# Patient Record
Sex: MALE | Race: WHITE | NOT HISPANIC OR LATINO
[De-identification: names, ages, dates, MRNs, and addresses within clinical notes are randomized per-mention and may not be internally consistent; named-entity substitution may affect disease eponyms.]

---

## 2017-09-15 ENCOUNTER — APPOINTMENT (OUTPATIENT)
Dept: CARDIOLOGY | Facility: CLINIC | Age: 62
End: 2017-09-15
Payer: COMMERCIAL

## 2017-09-15 ENCOUNTER — NON-APPOINTMENT (OUTPATIENT)
Age: 62
End: 2017-09-15

## 2017-09-15 VITALS
DIASTOLIC BLOOD PRESSURE: 77 MMHG | WEIGHT: 212 LBS | HEART RATE: 76 BPM | OXYGEN SATURATION: 95 % | BODY MASS INDEX: 34.07 KG/M2 | HEIGHT: 66 IN | SYSTOLIC BLOOD PRESSURE: 126 MMHG

## 2017-09-15 PROCEDURE — 99401 PREV MED CNSL INDIV APPRX 15: CPT

## 2017-09-15 PROCEDURE — 93000 ELECTROCARDIOGRAM COMPLETE: CPT

## 2017-09-15 PROCEDURE — 99215 OFFICE O/P EST HI 40 MIN: CPT | Mod: 25

## 2017-09-29 ENCOUNTER — APPOINTMENT (OUTPATIENT)
Dept: CARDIOLOGY | Facility: CLINIC | Age: 62
End: 2017-09-29
Payer: COMMERCIAL

## 2017-09-29 PROCEDURE — 93306 TTE W/DOPPLER COMPLETE: CPT

## 2017-10-19 ENCOUNTER — APPOINTMENT (OUTPATIENT)
Dept: CARDIOLOGY | Facility: CLINIC | Age: 62
End: 2017-10-19
Payer: COMMERCIAL

## 2017-10-19 PROCEDURE — 93015 CV STRESS TEST SUPVJ I&R: CPT

## 2018-04-19 ENCOUNTER — EMERGENCY (EMERGENCY)
Facility: HOSPITAL | Age: 63
LOS: 1 days | Discharge: HOME | End: 2018-04-19
Attending: EMERGENCY MEDICINE | Admitting: EMERGENCY MEDICINE
Payer: COMMERCIAL

## 2018-04-19 VITALS — DIASTOLIC BLOOD PRESSURE: 99 MMHG | SYSTOLIC BLOOD PRESSURE: 157 MMHG | HEART RATE: 75 BPM

## 2018-04-19 VITALS
DIASTOLIC BLOOD PRESSURE: 99 MMHG | HEART RATE: 85 BPM | WEIGHT: 195.11 LBS | OXYGEN SATURATION: 97 % | TEMPERATURE: 98 F | SYSTOLIC BLOOD PRESSURE: 161 MMHG | RESPIRATION RATE: 16 BRPM

## 2018-04-19 DIAGNOSIS — Z98.89 OTHER SPECIFIED POSTPROCEDURAL STATES: Chronic | ICD-10-CM

## 2018-04-19 DIAGNOSIS — J98.19 OTHER PULMONARY COLLAPSE: Chronic | ICD-10-CM

## 2018-04-19 PROCEDURE — 99283 EMERGENCY DEPT VISIT LOW MDM: CPT | Mod: 25

## 2018-04-19 PROCEDURE — 30901 CONTROL OF NOSEBLEED: CPT

## 2018-04-19 NOTE — ED ADULT NURSE NOTE - CHPI ED SYMPTOMS NEG
no vomiting/no weakness/no decreased eating/drinking/no nausea/no chills/no numbness/no pain/no tingling/no dizziness/no fever

## 2018-04-19 NOTE — ED PROVIDER NOTE - CONSTITUTIONAL, MLM
normal... Well appearing, white male,  well nourished, awake, alert, oriented to person, place, time/situation and in no apparent distress.

## 2018-04-19 NOTE — ED ADULT NURSE NOTE - OBJECTIVE STATEMENT
a/ox4 patient BIBEMS for nose bleed x45 minutes. patient states he wiped his nose, it started bleeding, and now it wont stop. Hx of HTN. pending kim SAUCEDO orders. No nosebleed at this time, bleeding has stopped.

## 2018-04-19 NOTE — ED PROVIDER NOTE - OBJECTIVE STATEMENT
63 yo white male with spontaneous nose bleed while at work today now c/o nose bleed x few hours now here for evaluation. No trauma. No headache.

## 2018-04-19 NOTE — ED ADULT NURSE NOTE - PMH
H/O hyperlipidemia    Heart murmur    Hypertension    Renal colic    Testosterone deficiency    Unilateral inguinal hernia with obstruction and without gangrene, recurrence not specified

## 2018-09-11 ENCOUNTER — RESULT REVIEW (OUTPATIENT)
Age: 63
End: 2018-09-11

## 2019-12-04 ENCOUNTER — TRANSCRIPTION ENCOUNTER (OUTPATIENT)
Age: 64
End: 2019-12-04

## 2020-07-10 ENCOUNTER — RECORD ABSTRACTING (OUTPATIENT)
Age: 65
End: 2020-07-10

## 2020-07-10 DIAGNOSIS — Z86.79 PERSONAL HISTORY OF OTHER DISEASES OF THE CIRCULATORY SYSTEM: ICD-10-CM

## 2020-08-21 ENCOUNTER — RX RENEWAL (OUTPATIENT)
Age: 65
End: 2020-08-21

## 2020-09-22 ENCOUNTER — APPOINTMENT (OUTPATIENT)
Dept: INTERNAL MEDICINE | Facility: CLINIC | Age: 65
End: 2020-09-22
Payer: MEDICARE

## 2020-09-22 VITALS — SYSTOLIC BLOOD PRESSURE: 120 MMHG | DIASTOLIC BLOOD PRESSURE: 80 MMHG

## 2020-09-22 VITALS
HEART RATE: 81 BPM | HEIGHT: 66 IN | TEMPERATURE: 98.5 F | WEIGHT: 215 LBS | BODY MASS INDEX: 34.55 KG/M2 | SYSTOLIC BLOOD PRESSURE: 142 MMHG | OXYGEN SATURATION: 97 % | DIASTOLIC BLOOD PRESSURE: 85 MMHG

## 2020-09-22 DIAGNOSIS — S50.861S: ICD-10-CM

## 2020-09-22 DIAGNOSIS — Z80.42 FAMILY HISTORY OF MALIGNANT NEOPLASM OF PROSTATE: ICD-10-CM

## 2020-09-22 DIAGNOSIS — E66.3 OVERWEIGHT: ICD-10-CM
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
## 2020-09-22 DIAGNOSIS — R01.0 BENIGN AND INNOCENT CARDIAC MURMURS: ICD-10-CM

## 2020-09-22 PROCEDURE — 36415 COLL VENOUS BLD VENIPUNCTURE: CPT

## 2020-09-22 PROCEDURE — 99213 OFFICE O/P EST LOW 20 MIN: CPT | Mod: 25

## 2020-09-22 NOTE — HISTORY OF PRESENT ILLNESS
[FreeTextEntry1] : ROUTINE FOLLOW UP BP CHECK  [de-identified] : PATIENT W/ H/O HTN , HYPERLIPIDEMIA , PREDIABETES , WAQAS , NON RHEUMATIC AS FOR ROUTINE BP CHECK , HE FEELS WELL BUT HAS GAINED APPROXIMATELY 10 LBS

## 2020-09-22 NOTE — ASSESSMENT
[FreeTextEntry1] : PATIENT REMAINS CLINICALLY  STABLE W/O CARDIORESPIRATORY SYMPTOMS , HE IS AWARE THAT HE NEEDS TO LOSE WEIGHT

## 2020-09-22 NOTE — PHYSICAL EXAM
[No Acute Distress] : no acute distress [No Carotid Bruits] : no carotid bruits [No Abdominal Bruit] : a ~M bruit was not heard ~T in the abdomen [No Edema] : there was no peripheral edema [Normal] : soft, non-tender, non-distended, no masses palpated, no HSM and normal bowel sounds [de-identified] : S1 S2 + SYSTOLIC MURMUR RSB

## 2020-09-23 LAB
ALBUMIN SERPL ELPH-MCNC: 4.5 G/DL
ALP BLD-CCNC: 62 U/L
ALT SERPL-CCNC: 20 U/L
ANION GAP SERPL CALC-SCNC: 14 MMOL/L
AST SERPL-CCNC: 21 U/L
BILIRUB SERPL-MCNC: 0.5 MG/DL
BUN SERPL-MCNC: 16 MG/DL
CALCIUM SERPL-MCNC: 9.2 MG/DL
CHLORIDE SERPL-SCNC: 105 MMOL/L
CHOLEST SERPL-MCNC: 136 MG/DL
CHOLEST/HDLC SERPL: 3.4 RATIO
CO2 SERPL-SCNC: 23 MMOL/L
CREAT SERPL-MCNC: 0.97 MG/DL
ESTIMATED AVERAGE GLUCOSE: 114 MG/DL
GLUCOSE SERPL-MCNC: 100 MG/DL
GLUCOSE SERPL-MCNC: 105 MG/DL
HBA1C MFR BLD HPLC: 5.6 %
HDLC SERPL-MCNC: 40 MG/DL
LDLC SERPL CALC-MCNC: 74 MG/DL
POTASSIUM SERPL-SCNC: 4.2 MMOL/L
PROT SERPL-MCNC: 7.4 G/DL
SODIUM SERPL-SCNC: 141 MMOL/L
TRIGL SERPL-MCNC: 110 MG/DL

## 2020-11-12 ENCOUNTER — APPOINTMENT (OUTPATIENT)
Dept: PHARMACY | Facility: CLINIC | Age: 65
End: 2020-11-12
Payer: MEDICARE

## 2020-11-12 PROCEDURE — V5265A: CUSTOM

## 2020-11-12 PROCEDURE — V5014I: CUSTOM

## 2020-12-21 ENCOUNTER — APPOINTMENT (OUTPATIENT)
Dept: PHARMACY | Facility: CLINIC | Age: 65
End: 2020-12-21

## 2020-12-21 ENCOUNTER — APPOINTMENT (OUTPATIENT)
Dept: OTOLARYNGOLOGY | Facility: CLINIC | Age: 65
End: 2020-12-21

## 2020-12-29 ENCOUNTER — APPOINTMENT (OUTPATIENT)
Dept: INTERNAL MEDICINE | Facility: CLINIC | Age: 65
End: 2020-12-29
Payer: MEDICARE

## 2020-12-29 VITALS
WEIGHT: 215 LBS | BODY MASS INDEX: 34.55 KG/M2 | HEART RATE: 79 BPM | SYSTOLIC BLOOD PRESSURE: 151 MMHG | OXYGEN SATURATION: 97 % | DIASTOLIC BLOOD PRESSURE: 97 MMHG | HEIGHT: 66 IN | RESPIRATION RATE: 12 BRPM

## 2020-12-29 VITALS — DIASTOLIC BLOOD PRESSURE: 80 MMHG | SYSTOLIC BLOOD PRESSURE: 128 MMHG

## 2020-12-29 PROCEDURE — 36415 COLL VENOUS BLD VENIPUNCTURE: CPT

## 2020-12-29 PROCEDURE — 99213 OFFICE O/P EST LOW 20 MIN: CPT | Mod: 25

## 2020-12-29 NOTE — HISTORY OF PRESENT ILLNESS
[FreeTextEntry1] : ROUTINE FOLLOW UP  [de-identified] : PATIENT W/ H/O HTN , HLD FOR ROUTINE FOLLOW UP , FEELS WELL , HAS PUT ON SOME WEIGHT

## 2020-12-29 NOTE — PHYSICAL EXAM
[No Acute Distress] : no acute distress [No Carotid Bruits] : no carotid bruits [No Edema] : there was no peripheral edema [Normal] : soft, non-tender, non-distended, no masses palpated, no HSM and normal bowel sounds [de-identified] : S1 S2 NORMAL  2-3 / 6 SYSTOLIC MURMUR RSB , LSB

## 2020-12-31 LAB
ALBUMIN SERPL ELPH-MCNC: 4.2 G/DL
ALP BLD-CCNC: 59 U/L
ALT SERPL-CCNC: 21 U/L
ANION GAP SERPL CALC-SCNC: 12 MMOL/L
AST SERPL-CCNC: 18 U/L
BILIRUB SERPL-MCNC: 0.5 MG/DL
BUN SERPL-MCNC: 18 MG/DL
CALCIUM SERPL-MCNC: 8.7 MG/DL
CHLORIDE SERPL-SCNC: 106 MMOL/L
CHOLEST SERPL-MCNC: 165 MG/DL
CO2 SERPL-SCNC: 24 MMOL/L
CREAT SERPL-MCNC: 0.98 MG/DL
GLUCOSE SERPL-MCNC: 103 MG/DL
HDLC SERPL-MCNC: 48 MG/DL
LDLC SERPL CALC-MCNC: 98 MG/DL
NONHDLC SERPL-MCNC: 117 MG/DL
POTASSIUM SERPL-SCNC: 4.1 MMOL/L
PROT SERPL-MCNC: 7.2 G/DL
SODIUM SERPL-SCNC: 142 MMOL/L
TRIGL SERPL-MCNC: 98 MG/DL

## 2021-03-30 ENCOUNTER — RX RENEWAL (OUTPATIENT)
Age: 66
End: 2021-03-30

## 2021-04-01 ENCOUNTER — NON-APPOINTMENT (OUTPATIENT)
Age: 66
End: 2021-04-01

## 2021-04-14 ENCOUNTER — LABORATORY RESULT (OUTPATIENT)
Age: 66
End: 2021-04-14

## 2021-04-14 ENCOUNTER — APPOINTMENT (OUTPATIENT)
Dept: INTERNAL MEDICINE | Facility: CLINIC | Age: 66
End: 2021-04-14
Payer: MEDICARE

## 2021-04-14 VITALS
HEART RATE: 83 BPM | BODY MASS INDEX: 34.23 KG/M2 | WEIGHT: 213 LBS | HEIGHT: 66 IN | RESPIRATION RATE: 12 BRPM | OXYGEN SATURATION: 96 % | SYSTOLIC BLOOD PRESSURE: 122 MMHG | DIASTOLIC BLOOD PRESSURE: 75 MMHG

## 2021-04-14 VITALS — SYSTOLIC BLOOD PRESSURE: 120 MMHG | DIASTOLIC BLOOD PRESSURE: 76 MMHG

## 2021-04-14 PROCEDURE — 36415 COLL VENOUS BLD VENIPUNCTURE: CPT

## 2021-04-14 PROCEDURE — 99214 OFFICE O/P EST MOD 30 MIN: CPT | Mod: 25

## 2021-04-14 NOTE — PHYSICAL EXAM
[No Acute Distress] : no acute distress [Regular Rhythm] : with a regular rhythm [Normal S1, S2] : normal S1 and S2 [No Carotid Bruits] : no carotid bruits [No Abdominal Bruit] : a ~M bruit was not heard ~T in the abdomen [No Edema] : there was no peripheral edema [Normal] : soft, non-tender, non-distended, no masses palpated, no HSM and normal bowel sounds [de-identified] : 3/6 SYSTOLIC MURMUR RSB

## 2021-04-14 NOTE — HISTORY OF PRESENT ILLNESS
[FreeTextEntry1] : ROUTINE FOLLOW UP [de-identified] : 1. HTN : FEELS WELL , HAS BEEN UNABLE TO SHED WEIGHT \par 2. HLD: ON STATIN W/ LDL < 100

## 2021-04-14 NOTE — REVIEW OF SYSTEMS
[Negative] : Gastrointestinal [Dysuria] : no dysuria [Incontinence] : no incontinence [Hesitancy] : no hesitancy [Nocturia] : no nocturia [Hematuria] : no hematuria [Frequency] : no frequency

## 2021-04-15 LAB
CHOLEST SERPL-MCNC: 179 MG/DL
ESTIMATED AVERAGE GLUCOSE: 117 MG/DL
GLUCOSE SERPL-MCNC: 105 MG/DL
HBA1C MFR BLD HPLC: 5.7 %
HDLC SERPL-MCNC: 45 MG/DL
LDLC SERPL CALC-MCNC: 110 MG/DL
NONHDLC SERPL-MCNC: 134 MG/DL
TRIGL SERPL-MCNC: 119 MG/DL

## 2021-06-21 ENCOUNTER — RX RENEWAL (OUTPATIENT)
Age: 66
End: 2021-06-21

## 2021-08-03 ENCOUNTER — APPOINTMENT (OUTPATIENT)
Dept: INTERNAL MEDICINE | Facility: CLINIC | Age: 66
End: 2021-08-03
Payer: MEDICARE

## 2021-08-03 VITALS
OXYGEN SATURATION: 97 % | WEIGHT: 210 LBS | HEIGHT: 66 IN | HEART RATE: 65 BPM | SYSTOLIC BLOOD PRESSURE: 146 MMHG | RESPIRATION RATE: 12 BRPM | DIASTOLIC BLOOD PRESSURE: 80 MMHG | BODY MASS INDEX: 33.75 KG/M2

## 2021-08-03 VITALS — DIASTOLIC BLOOD PRESSURE: 80 MMHG | SYSTOLIC BLOOD PRESSURE: 140 MMHG

## 2021-08-03 PROCEDURE — 99214 OFFICE O/P EST MOD 30 MIN: CPT | Mod: 25

## 2021-08-03 PROCEDURE — 36415 COLL VENOUS BLD VENIPUNCTURE: CPT

## 2021-08-03 NOTE — HISTORY OF PRESENT ILLNESS
[FreeTextEntry1] : ROUTINE FOLLOW UP  [de-identified] : 1. HTN : FEELS WELL , HAS BEEN OFF LOW SODIUM DIET RECENTLY \par 2. PRE DIABETES \par 3. HLD \par 4. C/O RASH RIGHT THIGH X 2 WEEKS : [place] AND [place]IMM[place] A LOT

## 2021-08-04 LAB
ALBUMIN SERPL ELPH-MCNC: 4.2 G/DL
ALP BLD-CCNC: 60 U/L
ALT SERPL-CCNC: 20 U/L
ANION GAP SERPL CALC-SCNC: 11 MMOL/L
AST SERPL-CCNC: 20 U/L
BILIRUB SERPL-MCNC: 0.4 MG/DL
BUN SERPL-MCNC: 19 MG/DL
CALCIUM SERPL-MCNC: 9.3 MG/DL
CHLORIDE SERPL-SCNC: 106 MMOL/L
CHOLEST SERPL-MCNC: 143 MG/DL
CO2 SERPL-SCNC: 23 MMOL/L
CREAT SERPL-MCNC: 0.97 MG/DL
ESTIMATED AVERAGE GLUCOSE: 117 MG/DL
GLUCOSE SERPL-MCNC: 104 MG/DL
GLUCOSE SERPL-MCNC: 97 MG/DL
HBA1C MFR BLD HPLC: 5.7 %
HDLC SERPL-MCNC: 42 MG/DL
LDLC SERPL CALC-MCNC: 81 MG/DL
NONHDLC SERPL-MCNC: 101 MG/DL
POTASSIUM SERPL-SCNC: 4.5 MMOL/L
PROT SERPL-MCNC: 7.3 G/DL
SODIUM SERPL-SCNC: 140 MMOL/L
TRIGL SERPL-MCNC: 101 MG/DL

## 2021-09-03 NOTE — PHYSICAL EXAM
[No Acute Distress] : no acute distress [No Lymphadenopathy] : no lymphadenopathy [Thyroid Normal, No Nodules] : the thyroid was normal and there were no nodules present [Regular Rhythm] : with a regular rhythm [Normal S1, S2] : normal S1 and S2 [No Carotid Bruits] : no carotid bruits [No Edema] : there was no peripheral edema [Normal] : soft, non-tender, non-distended, no masses palpated, no HSM and normal bowel sounds [de-identified] : 3-4 /6 SYSTOLIC MURMUR RSB  [de-identified] : + OVAL RASH , ERYTHEMATOUS W/ [facility] RIGHT THIGH , 2 AREAS  Admitting Office

## 2021-09-14 ENCOUNTER — RX RENEWAL (OUTPATIENT)
Age: 66
End: 2021-09-14

## 2021-09-17 ENCOUNTER — RX RENEWAL (OUTPATIENT)
Age: 66
End: 2021-09-17

## 2021-11-04 ENCOUNTER — APPOINTMENT (OUTPATIENT)
Dept: INTERNAL MEDICINE | Facility: CLINIC | Age: 66
End: 2021-11-04
Payer: MEDICARE

## 2021-11-04 VITALS
HEIGHT: 66 IN | RESPIRATION RATE: 12 BRPM | OXYGEN SATURATION: 94 % | SYSTOLIC BLOOD PRESSURE: 130 MMHG | DIASTOLIC BLOOD PRESSURE: 79 MMHG | WEIGHT: 206.44 LBS | BODY MASS INDEX: 33.18 KG/M2 | HEART RATE: 73 BPM

## 2021-11-04 VITALS — DIASTOLIC BLOOD PRESSURE: 70 MMHG | SYSTOLIC BLOOD PRESSURE: 128 MMHG

## 2021-11-04 PROCEDURE — 90686 IIV4 VACC NO PRSV 0.5 ML IM: CPT

## 2021-11-04 PROCEDURE — G0008: CPT

## 2021-11-04 PROCEDURE — 99214 OFFICE O/P EST MOD 30 MIN: CPT | Mod: 25

## 2021-11-04 PROCEDURE — 36415 COLL VENOUS BLD VENIPUNCTURE: CPT

## 2021-11-04 NOTE — ASSESSMENT
[FreeTextEntry1] : -STABLE \par -CONT PRESENT MEDS \par -ADVISED WEIGHT REDUCTION \par -REG AEROBIC EXERCISE

## 2021-11-04 NOTE — HISTORY OF PRESENT ILLNESS
[FreeTextEntry1] : ROUTINE FOLLOW UP  [de-identified] : 1. HTN \par 2. HLD \par 3. PRE DIABETES \par FEELS WELL , COMPLIANT W/ ALL MEDS \par 4. ENCOUNTER FOR FLU VACCINATION

## 2021-11-10 LAB
ALBUMIN SERPL ELPH-MCNC: 4.3 G/DL
ALP BLD-CCNC: 65 U/L
ALT SERPL-CCNC: 16 U/L
ANION GAP SERPL CALC-SCNC: 14 MMOL/L
AST SERPL-CCNC: 19 U/L
BILIRUB SERPL-MCNC: 0.5 MG/DL
BUN SERPL-MCNC: 18 MG/DL
CALCIUM SERPL-MCNC: 9.4 MG/DL
CHLORIDE SERPL-SCNC: 104 MMOL/L
CHOLEST SERPL-MCNC: 145 MG/DL
CO2 SERPL-SCNC: 23 MMOL/L
CREAT SERPL-MCNC: 0.93 MG/DL
ESTIMATED AVERAGE GLUCOSE: 120 MG/DL
GLUCOSE SERPL-MCNC: 94 MG/DL
GLUCOSE SERPL-MCNC: 97 MG/DL
HBA1C MFR BLD HPLC: 5.8 %
HDLC SERPL-MCNC: 41 MG/DL
LDLC SERPL CALC-MCNC: 81 MG/DL
NONHDLC SERPL-MCNC: 104 MG/DL
POTASSIUM SERPL-SCNC: 4.6 MMOL/L
PROT SERPL-MCNC: 7.5 G/DL
SODIUM SERPL-SCNC: 141 MMOL/L
TRIGL SERPL-MCNC: 114 MG/DL

## 2022-01-31 ENCOUNTER — RX RENEWAL (OUTPATIENT)
Age: 67
End: 2022-01-31

## 2022-02-09 ENCOUNTER — APPOINTMENT (OUTPATIENT)
Dept: INTERNAL MEDICINE | Facility: CLINIC | Age: 67
End: 2022-02-09
Payer: MEDICARE

## 2022-02-09 VITALS
SYSTOLIC BLOOD PRESSURE: 123 MMHG | HEART RATE: 75 BPM | HEIGHT: 66 IN | WEIGHT: 206 LBS | DIASTOLIC BLOOD PRESSURE: 77 MMHG | RESPIRATION RATE: 12 BRPM | BODY MASS INDEX: 33.11 KG/M2 | OXYGEN SATURATION: 98 %

## 2022-02-09 DIAGNOSIS — N40.1 BENIGN PROSTATIC HYPERPLASIA WITH LOWER URINARY TRACT SYMPMS: ICD-10-CM

## 2022-02-09 DIAGNOSIS — R35.1 BENIGN PROSTATIC HYPERPLASIA WITH LOWER URINARY TRACT SYMPMS: ICD-10-CM

## 2022-02-09 PROCEDURE — 99213 OFFICE O/P EST LOW 20 MIN: CPT

## 2022-02-09 NOTE — HISTORY OF PRESENT ILLNESS
[FreeTextEntry1] : ROUTINE FOLLOW UP  [de-identified] : 1. HTN \par 2. HLD \par FEELS WELL\par 3. CARDIAC MURMUR

## 2022-02-09 NOTE — PHYSICAL EXAM
[No Acute Distress] : no acute distress [Normal Sclera/Conjunctiva] : normal sclera/conjunctiva [Normal S1, S2] : normal S1 and S2 [No Edema] : there was no peripheral edema [Normal] : soft, non-tender, non-distended, no masses palpated, no HSM and normal bowel sounds [No CVA Tenderness] : no CVA  tenderness [de-identified] : 4/6 SYSTOLIC MURMUR RSB  [de-identified] : + TRANSMITTED MURMUR TO CAROTIDS

## 2022-03-08 ENCOUNTER — NON-APPOINTMENT (OUTPATIENT)
Age: 67
End: 2022-03-08

## 2022-03-08 ENCOUNTER — APPOINTMENT (OUTPATIENT)
Dept: CARDIOLOGY | Facility: CLINIC | Age: 67
End: 2022-03-08
Payer: MEDICARE

## 2022-03-08 VITALS
HEART RATE: 74 BPM | DIASTOLIC BLOOD PRESSURE: 83 MMHG | SYSTOLIC BLOOD PRESSURE: 137 MMHG | BODY MASS INDEX: 33.43 KG/M2 | WEIGHT: 208 LBS | OXYGEN SATURATION: 97 % | HEIGHT: 66 IN

## 2022-03-08 PROCEDURE — 93000 ELECTROCARDIOGRAM COMPLETE: CPT

## 2022-03-08 PROCEDURE — 99205 OFFICE O/P NEW HI 60 MIN: CPT

## 2022-03-14 ENCOUNTER — APPOINTMENT (OUTPATIENT)
Dept: CARDIOLOGY | Facility: CLINIC | Age: 67
End: 2022-03-14
Payer: MEDICARE

## 2022-03-14 PROCEDURE — 93306 TTE W/DOPPLER COMPLETE: CPT

## 2022-03-14 PROCEDURE — 93880 EXTRACRANIAL BILAT STUDY: CPT

## 2022-03-14 RX ORDER — PERFLUTREN 6.52 MG/ML
6.52 INJECTION, SUSPENSION INTRAVENOUS
Qty: 2 | Refills: 0 | Status: COMPLETED | OUTPATIENT
Start: 2022-03-14

## 2022-03-14 RX ADMIN — PERFLUTREN MG/ML: 6.52 INJECTION, SUSPENSION INTRAVENOUS at 00:00

## 2022-03-23 ENCOUNTER — APPOINTMENT (OUTPATIENT)
Dept: CARDIOLOGY | Facility: CLINIC | Age: 67
End: 2022-03-23
Payer: MEDICARE

## 2022-03-23 ENCOUNTER — NON-APPOINTMENT (OUTPATIENT)
Age: 67
End: 2022-03-23

## 2022-03-23 VITALS
SYSTOLIC BLOOD PRESSURE: 136 MMHG | BODY MASS INDEX: 33.43 KG/M2 | HEIGHT: 66 IN | HEART RATE: 71 BPM | WEIGHT: 208 LBS | DIASTOLIC BLOOD PRESSURE: 81 MMHG | OXYGEN SATURATION: 96 %

## 2022-03-23 PROCEDURE — 93000 ELECTROCARDIOGRAM COMPLETE: CPT

## 2022-03-23 PROCEDURE — 99215 OFFICE O/P EST HI 40 MIN: CPT

## 2022-03-25 ENCOUNTER — APPOINTMENT (OUTPATIENT)
Dept: CARDIOTHORACIC SURGERY | Facility: CLINIC | Age: 67
End: 2022-03-25
Payer: MEDICARE

## 2022-03-25 VITALS
RESPIRATION RATE: 14 BRPM | BODY MASS INDEX: 33.59 KG/M2 | TEMPERATURE: 98.1 F | WEIGHT: 209 LBS | DIASTOLIC BLOOD PRESSURE: 76 MMHG | SYSTOLIC BLOOD PRESSURE: 144 MMHG | HEART RATE: 70 BPM | HEIGHT: 66 IN | OXYGEN SATURATION: 98 %

## 2022-03-25 DIAGNOSIS — G47.33 OBSTRUCTIVE SLEEP APNEA (ADULT) (PEDIATRIC): ICD-10-CM

## 2022-03-25 PROCEDURE — 99203 OFFICE O/P NEW LOW 30 MIN: CPT

## 2022-03-25 RX ORDER — CLOTRIMAZOLE AND BETAMETHASONE DIPROPIONATE 10; .5 MG/G; MG/G
1-0.05 CREAM TOPICAL TWICE DAILY
Qty: 1 | Refills: 0 | Status: COMPLETED | COMMUNITY
Start: 2021-08-03 | End: 2022-03-25

## 2022-03-30 NOTE — END OF VISIT
[FreeTextEntry3] : Written by Cosme Lopez NP, acting as a scribe for Dr. Cameron\par “The documentation recorded by the scribe accurately reflects the service I personally performed and the decisions made by me.” Signature Micah Cameron MD.

## 2022-03-30 NOTE — REVIEW OF SYSTEMS
[As Noted in HPI] : as noted in HPI [Shortness Of Breath] : shortness of breath [SOB on Exertion] : shortness of breath during exertion [Negative] : Heme/Lymph [Chest Pain] : no chest pain [Palpitations] : no palpitations [Lower Ext Edema] : no extremity edema

## 2022-03-30 NOTE — CONSULT LETTER
[Dear  ___] : Dear  [unfilled], [FreeTextEntry2] : Dr.Gaetano Gill [FreeTextEntry1] : I had the pleasure of seeing your patient, HUGH SCOTT,in my office today. \par \par We take a multidisciplinary team approach to patient care and consider you, the referring physician, an extension of our team. We will maintain an open line of communication with you throughout your patient's treatment course. \par \par He  is being evaluated for Aortic stenosis . I have reviewed all of the patient's medical records and diagnostic images at the time of his  office consultation. I have enclosed a copy for your records. \par \par 3/14/22 TTE revealed EF 75%, Minimal mitral regurgitation. Calcified aortic valve with decreased opening. PGR 84 mm hg, MGR 56 mm hg , estimated STONE 0.6 sq cm. aortic valve velocity time integral equals 106 cm, consistent with severe aortic stenosis. Mild aortic regurgitation. Moderately dilated LT atrium. Minimal TR.\par \par I have reviewed the indications for surgery and anatomy of the heart. The patient meets criteria for surgery. I have recommended that the patient is a candidate for a ______________. \par \par Surgery is scheduled for ___________.  I will update you on his perioperative status and disposition upon discharge. \par \par I appreciate the opportunity to care for your patient at the  [facility] based at [place]. If there are any questions or concerns, please call me  at [phone].\par \par Please see my note below. \par \par Sincerely, \par \par \par \par \par Micah Cameron MD\par Director\par [facility]\par Professor \par Cardiovascular & Thoracic Surgery\par [employer].\par \par \par [facility]:\par Department of Cardiovascular and Thoracic Surgery\par [address] [place], [address]\par Office: [phone]\par Fax: [phone]\par \par [facility] Pace\par  \par Department of Cardiovascular and Thoracic Surgery\par [address] [place], [address]\par Phone: [phone]\par Office: [phone]\par \par \par \par \par \par

## 2022-03-30 NOTE — HISTORY OF PRESENT ILLNESS
[FreeTextEntry1] : Mr. SCOTT is a 66 year old male with  past medical history of Hypertension, Hyperlipidemia, WAQAS (uses CPAP) ,BPH, aortic stenosis  with complaints of dyspnea on exertion. He is referred by Dr.Gaetano Gill for initial evaluation and management for Aortic stenosis. \par \par He presents today with his wife and reports worsening SOB with activity that has worsened over the past 6 months to one year.  He first noticed it with walking up and down his stairs at home. Better able to walk on flat surfaces but does note he also has to stop often when walking his dog. Sleeps with one pillow and denies orthopnea. Denies CP, palpitations, dizziness, weight gain, LE swelling, fevers or chills. Tested COVID + in December 2021 but was asymptomatic.  He has completed COVID vaccine + 1 booster. Mother and maternal uncle with hx of HF/CAD both passed away in early 60's. He is a retired emergency , retired four years ago. \par \par Denies prior MI, CVA, lung, liver, kidney disease.  No DM.

## 2022-03-30 NOTE — PHYSICAL EXAM
[General Appearance - Alert] : alert [General Appearance - In No Acute Distress] : in no acute distress [General Appearance - Well Developed] : well developed [General Appearance - Well Nourished] : well nourished [Sclera] : the sclera and conjunctiva were normal [Neck Appearance] : the appearance of the neck was normal [Jugular Venous Distention Increased] : there was no jugular-venous distention [] : no respiratory distress [Respiration, Rhythm And Depth] : normal respiratory rhythm and effort [Exaggerated Use Of Accessory Muscles For Inspiration] : no accessory muscle use [Auscultation Breath Sounds / Voice Sounds] : lungs were clear to auscultation bilaterally [Apical Impulse] : the apical impulse was normal [Heart Rate And Rhythm] : heart rate was normal and rhythm regular [Heart Sounds] : normal S1 and S2 [Examination Of The Chest] : the chest was normal in appearance [Bowel Sounds] : normal bowel sounds [Abdomen Soft] : soft [Abdomen Tenderness] : non-tender [No CVA Tenderness] : no ~M costovertebral angle tenderness [Involuntary Movements] : no involuntary movements were seen [No Focal Deficits] : no focal deficits [Oriented To Time, Place, And Person] : oriented to person, place, and time [Impaired Insight] : insight and judgment were intact [Affect] : the affect was normal [Mood] : the mood was normal [FreeTextEntry1] : murmur auscultated to bilateral carotids

## 2022-03-30 NOTE — ASSESSMENT
[FreeTextEntry1] : I reviewed the cardiac imaging, medical records and reports with patient and discussed the case.  I discussed the risks, benefits and alternatives to open aortic valve replacement surgery. Risks included but not limited to bleeding, stroke, Myocardial Infarction, kidney problems, blood transfusion, permanent pacemaker implantation, infections and death. I quoted a 2-3% operative mortality and complication risks. I also discussed tissue valve vs mechanical valve options and the pros and cons associated with both. I discussed the various approaches in detail. I feel that the patient will benefit and is a candidate for open aortic valve replacement.  All questions and concerns were addressed and patient agrees to proceed with surgery. \par \par Plan:\par 1) Aortic valve replacement \par 2) Cardiac Catherization to evaluate coronary arteries prior to surgery\par 3) PST prior to surgery\par \par ADDENDUM--66 yr old male with echo showing severe calcific aortic stenosis with peak gradient of 56 mmHg and STONE 0.6 cm2.  LV systolic function is normal with moderate concentric LVH.  Plan AVR with bioprosthetic valve.  Risks, including but not limited to bleeding, infection, mi, stroke, blood transfusions, pacemaker implant, and death discussed with patient.  \par \par \par

## 2022-05-01 ENCOUNTER — RX RENEWAL (OUTPATIENT)
Age: 67
End: 2022-05-01

## 2022-05-03 ENCOUNTER — INPATIENT (INPATIENT)
Facility: HOSPITAL | Age: 67
LOS: 5 days | Discharge: HOME HEALTH | DRG: 216 | End: 2022-05-09
Attending: THORACIC SURGERY (CARDIOTHORACIC VASCULAR SURGERY) | Admitting: INTERNAL MEDICINE
Payer: MEDICARE

## 2022-05-03 VITALS
WEIGHT: 216.05 LBS | HEIGHT: 66 IN | DIASTOLIC BLOOD PRESSURE: 74 MMHG | TEMPERATURE: 98 F | RESPIRATION RATE: 16 BRPM | OXYGEN SATURATION: 99 % | HEART RATE: 75 BPM | SYSTOLIC BLOOD PRESSURE: 163 MMHG

## 2022-05-03 DIAGNOSIS — Z98.890 OTHER SPECIFIED POSTPROCEDURAL STATES: Chronic | ICD-10-CM

## 2022-05-03 DIAGNOSIS — I35.0 NONRHEUMATIC AORTIC (VALVE) STENOSIS: ICD-10-CM

## 2022-05-03 DIAGNOSIS — J98.19 OTHER PULMONARY COLLAPSE: Chronic | ICD-10-CM

## 2022-05-03 DIAGNOSIS — Z98.89 OTHER SPECIFIED POSTPROCEDURAL STATES: Chronic | ICD-10-CM

## 2022-05-03 LAB
A1C WITH ESTIMATED AVERAGE GLUCOSE RESULT: 5.9 % — HIGH (ref 4–5.6)
ALBUMIN SERPL ELPH-MCNC: 4 G/DL — SIGNIFICANT CHANGE UP (ref 3.3–5)
ALP SERPL-CCNC: 57 U/L — SIGNIFICANT CHANGE UP (ref 40–120)
ALT FLD-CCNC: 16 U/L — SIGNIFICANT CHANGE UP (ref 10–45)
ANION GAP SERPL CALC-SCNC: 11 MMOL/L — SIGNIFICANT CHANGE UP (ref 5–17)
APPEARANCE UR: CLEAR — SIGNIFICANT CHANGE UP
APTT BLD: 28 SEC — SIGNIFICANT CHANGE UP (ref 27.5–35.5)
AST SERPL-CCNC: 17 U/L — SIGNIFICANT CHANGE UP (ref 10–40)
BILIRUB SERPL-MCNC: 0.4 MG/DL — SIGNIFICANT CHANGE UP (ref 0.2–1.2)
BILIRUB UR-MCNC: NEGATIVE — SIGNIFICANT CHANGE UP
BLD GP AB SCN SERPL QL: NEGATIVE — SIGNIFICANT CHANGE UP
BUN SERPL-MCNC: 22 MG/DL — SIGNIFICANT CHANGE UP (ref 7–23)
CALCIUM SERPL-MCNC: 9.1 MG/DL — SIGNIFICANT CHANGE UP (ref 8.4–10.5)
CHLORIDE SERPL-SCNC: 107 MMOL/L — SIGNIFICANT CHANGE UP (ref 96–108)
CHOLEST SERPL-MCNC: 154 MG/DL — SIGNIFICANT CHANGE UP
CO2 SERPL-SCNC: 24 MMOL/L — SIGNIFICANT CHANGE UP (ref 22–31)
COLOR SPEC: SIGNIFICANT CHANGE UP
CREAT SERPL-MCNC: 0.93 MG/DL — SIGNIFICANT CHANGE UP (ref 0.5–1.3)
DIFF PNL FLD: NEGATIVE — SIGNIFICANT CHANGE UP
EGFR: 90 ML/MIN/1.73M2 — SIGNIFICANT CHANGE UP
ESTIMATED AVERAGE GLUCOSE: 123 MG/DL — HIGH (ref 68–114)
FIBRINOGEN PPP-MCNC: 536 MG/DL — HIGH (ref 330–520)
GLUCOSE SERPL-MCNC: 111 MG/DL — HIGH (ref 70–99)
GLUCOSE UR QL: NEGATIVE — SIGNIFICANT CHANGE UP
HCT VFR BLD CALC: 44.1 % — SIGNIFICANT CHANGE UP (ref 39–50)
HDLC SERPL-MCNC: 37 MG/DL — LOW
HGB BLD-MCNC: 14.1 G/DL — SIGNIFICANT CHANGE UP (ref 13–17)
INR BLD: 1.06 RATIO — SIGNIFICANT CHANGE UP (ref 0.88–1.16)
KETONES UR-MCNC: NEGATIVE — SIGNIFICANT CHANGE UP
LEUKOCYTE ESTERASE UR-ACNC: NEGATIVE — SIGNIFICANT CHANGE UP
LIPID PNL WITH DIRECT LDL SERPL: 99 MG/DL — SIGNIFICANT CHANGE UP
MCHC RBC-ENTMCNC: 27 PG — SIGNIFICANT CHANGE UP (ref 27–34)
MCHC RBC-ENTMCNC: 32 GM/DL — SIGNIFICANT CHANGE UP (ref 32–36)
MCV RBC AUTO: 84.5 FL — SIGNIFICANT CHANGE UP (ref 80–100)
NITRITE UR-MCNC: NEGATIVE — SIGNIFICANT CHANGE UP
NON HDL CHOLESTEROL: 117 MG/DL — SIGNIFICANT CHANGE UP
NRBC # BLD: 0 /100 WBCS — SIGNIFICANT CHANGE UP (ref 0–0)
NT-PROBNP SERPL-SCNC: 1023 PG/ML — HIGH (ref 0–300)
PH UR: 7 — SIGNIFICANT CHANGE UP (ref 5–8)
PLATELET # BLD AUTO: 149 K/UL — LOW (ref 150–400)
POTASSIUM SERPL-MCNC: 4.7 MMOL/L — SIGNIFICANT CHANGE UP (ref 3.5–5.3)
POTASSIUM SERPL-SCNC: 4.7 MMOL/L — SIGNIFICANT CHANGE UP (ref 3.5–5.3)
PROT SERPL-MCNC: 7.5 G/DL — SIGNIFICANT CHANGE UP (ref 6–8.3)
PROT UR-MCNC: NEGATIVE — SIGNIFICANT CHANGE UP
PROTHROM AB SERPL-ACNC: 12.3 SEC — SIGNIFICANT CHANGE UP (ref 10.5–13.4)
RBC # BLD: 5.22 M/UL — SIGNIFICANT CHANGE UP (ref 4.2–5.8)
RBC # FLD: 13.8 % — SIGNIFICANT CHANGE UP (ref 10.3–14.5)
RH IG SCN BLD-IMP: POSITIVE — SIGNIFICANT CHANGE UP
SARS-COV-2 RNA SPEC QL NAA+PROBE: SIGNIFICANT CHANGE UP
SODIUM SERPL-SCNC: 142 MMOL/L — SIGNIFICANT CHANGE UP (ref 135–145)
SP GR SPEC: 1.02 — SIGNIFICANT CHANGE UP (ref 1.01–1.02)
T3 SERPL-MCNC: 109 NG/DL — SIGNIFICANT CHANGE UP (ref 80–200)
T4 AB SER-ACNC: 8.1 UG/DL — SIGNIFICANT CHANGE UP (ref 4.6–12)
TRIGL SERPL-MCNC: 90 MG/DL — SIGNIFICANT CHANGE UP
TSH SERPL-MCNC: 1.92 UIU/ML — SIGNIFICANT CHANGE UP (ref 0.27–4.2)
TSH SERPL-MCNC: 1.94 UIU/ML — SIGNIFICANT CHANGE UP (ref 0.27–4.2)
UROBILINOGEN FLD QL: NEGATIVE — SIGNIFICANT CHANGE UP
WBC # BLD: 7.8 K/UL — SIGNIFICANT CHANGE UP (ref 3.8–10.5)
WBC # FLD AUTO: 7.8 K/UL — SIGNIFICANT CHANGE UP (ref 3.8–10.5)

## 2022-05-03 PROCEDURE — 99152 MOD SED SAME PHYS/QHP 5/>YRS: CPT

## 2022-05-03 PROCEDURE — 93454 CORONARY ARTERY ANGIO S&I: CPT | Mod: 26

## 2022-05-03 PROCEDURE — 71045 X-RAY EXAM CHEST 1 VIEW: CPT | Mod: 26

## 2022-05-03 PROCEDURE — 93010 ELECTROCARDIOGRAM REPORT: CPT

## 2022-05-03 PROCEDURE — 99024 POSTOP FOLLOW-UP VISIT: CPT

## 2022-05-03 RX ORDER — CHLORHEXIDINE GLUCONATE 213 G/1000ML
5 SOLUTION TOPICAL ONCE
Refills: 0 | Status: COMPLETED | OUTPATIENT
Start: 2022-05-03 | End: 2022-05-04

## 2022-05-03 RX ORDER — LISINOPRIL 2.5 MG/1
40 TABLET ORAL DAILY
Refills: 0 | Status: DISCONTINUED | OUTPATIENT
Start: 2022-05-03 | End: 2022-05-03

## 2022-05-03 RX ORDER — SODIUM CHLORIDE 9 MG/ML
3 INJECTION INTRAMUSCULAR; INTRAVENOUS; SUBCUTANEOUS EVERY 8 HOURS
Refills: 0 | Status: DISCONTINUED | OUTPATIENT
Start: 2022-05-03 | End: 2022-05-04

## 2022-05-03 RX ORDER — ACETAMINOPHEN 500 MG
1000 TABLET ORAL ONCE
Refills: 0 | Status: COMPLETED | OUTPATIENT
Start: 2022-05-04 | End: 2022-05-04

## 2022-05-03 RX ORDER — ATORVASTATIN CALCIUM 80 MG/1
80 TABLET, FILM COATED ORAL AT BEDTIME
Refills: 0 | Status: DISCONTINUED | OUTPATIENT
Start: 2022-05-03 | End: 2022-05-04

## 2022-05-03 RX ORDER — HEPARIN SODIUM 5000 [USP'U]/ML
5000 INJECTION INTRAVENOUS; SUBCUTANEOUS EVERY 8 HOURS
Refills: 0 | Status: DISCONTINUED | OUTPATIENT
Start: 2022-05-03 | End: 2022-05-04

## 2022-05-03 RX ORDER — VANCOMYCIN HCL 1 G
1000 VIAL (EA) INTRAVENOUS ONCE
Refills: 0 | Status: DISCONTINUED | OUTPATIENT
Start: 2022-05-04 | End: 2022-05-04

## 2022-05-03 RX ORDER — GABAPENTIN 400 MG/1
300 CAPSULE ORAL ONCE
Refills: 0 | Status: COMPLETED | OUTPATIENT
Start: 2022-05-04 | End: 2022-05-04

## 2022-05-03 RX ORDER — CHLORHEXIDINE GLUCONATE 213 G/1000ML
1 SOLUTION TOPICAL ONCE
Refills: 0 | Status: COMPLETED | OUTPATIENT
Start: 2022-05-03 | End: 2022-05-03

## 2022-05-03 RX ORDER — ASCORBIC ACID 60 MG
2000 TABLET,CHEWABLE ORAL ONCE
Refills: 0 | Status: COMPLETED | OUTPATIENT
Start: 2022-05-03 | End: 2022-05-03

## 2022-05-03 RX ADMIN — ATORVASTATIN CALCIUM 80 MILLIGRAM(S): 80 TABLET, FILM COATED ORAL at 22:09

## 2022-05-03 RX ADMIN — Medication 2000 MILLIGRAM(S): at 22:10

## 2022-05-03 RX ADMIN — SODIUM CHLORIDE 3 MILLILITER(S): 9 INJECTION INTRAMUSCULAR; INTRAVENOUS; SUBCUTANEOUS at 22:17

## 2022-05-03 RX ADMIN — HEPARIN SODIUM 5000 UNIT(S): 5000 INJECTION INTRAVENOUS; SUBCUTANEOUS at 22:09

## 2022-05-03 RX ADMIN — CHLORHEXIDINE GLUCONATE 1 APPLICATION(S): 213 SOLUTION TOPICAL at 22:00

## 2022-05-03 RX ADMIN — SODIUM CHLORIDE 3 MILLILITER(S): 9 INJECTION INTRAMUSCULAR; INTRAVENOUS; SUBCUTANEOUS at 15:52

## 2022-05-03 NOTE — ASU PATIENT PROFILE, ADULT - FALL HARM RISK - UNIVERSAL INTERVENTIONS
Bed in lowest position, wheels locked, appropriate side rails in place/Call bell, personal items and telephone in reach/Instruct patient to call for assistance before getting out of bed or chair/Non-slip footwear when patient is out of bed/[place] to call system/Physically safe environment - no spills, clutter or unnecessary equipment/Purposeful Proactive Rounding/Room/bathroom lighting operational, light cord in reach

## 2022-05-03 NOTE — PROGRESS NOTE ADULT - ASSESSMENT
66 year old male with no implantable cardiac devices and PMH of WAQAS -uses CPAP, htn, hld, AS, kidney stones, COVID infection Dec 2021 (not hospitalized) and pre DM presents today for [facility]. Patient reports progressive WHALEY since 2018, at which time he was diagnosed with AS. Over the past 6 months approx he feels increasing WHALEY- even when walking the dog. Denies CP, dizziness or syncope. Repeat ECHO was done 3/14/2022 revealing min MR, severe AS , mild AR, EF 75%.  Patient was referred to Dr Micah Cameron for AVR scheduled for 5/4/2022. For [facility] today to evaluate coronaries prior to OHS.

## 2022-05-03 NOTE — PATIENT PROFILE ADULT - FALL HARM RISK - UNIVERSAL INTERVENTIONS
Bed in lowest position, wheels locked, appropriate side rails in place/Call bell, personal items and telephone in reach/Instruct patient to call for assistance before getting out of bed or chair/Non-slip footwear when patient is out of bed/[place] to call system/Physically safe environment - no spills, clutter or unnecessary equipment/Purposeful Proactive Rounding/Room/bathroom lighting operational, light cord in reach

## 2022-05-03 NOTE — CHART NOTE - NSCHARTNOTEFT_GEN_A_CORE
Scheduled for elective LHC today pre AVR in AM   Pre and post hydration not given due to severe aortic stenosis

## 2022-05-03 NOTE — PROGRESS NOTE ADULT - SUBJECTIVE AND OBJECTIVE BOX
Cardiac Surgery Pre-op Note:    CC: Patient is a 66y old  Male who presents with a chief complaint of     Referring Physician:                                                                                                           Surgeon:    Procedure: (Date) (Procedure)    Allergies    penicillin (Urticaria; Rash)  pollen itchy eye (Eye Irritation)  sulfa drugs (Urticaria; Rash)    Intolerances        HPI:  66 year old male with no implantable cardiac devices and PMH of WAQAS -uses CPAP, htn, hld, AS, kidney stones, COVID infection Dec 2021 (not hospitalized) and pre DM presents today for C. Patient reports progressive WHALEY since 2018, at which time he was diagnosed with AS. Over the past 6 months approx he feels increasing WHALEY- even when walking the dog. Denies CP, dizziness or syncope. Repeat ECHO was done 3/14/2022 revealing min MR, severe AS , mild AR, EF 75%.  Patient was referred to Dr Micah Cameron for AVR scheduled for 5/4/2022. For [facility] today to evaluate coronaries prior to OHS.  (03 May 2022 08:14)      PAST MEDICAL & SURGICAL HISTORY:  Hypertension    Testosterone deficiency    H/O hyperlipidemia    Renal colic    Unilateral inguinal hernia with obstruction and without gangrene, recurrence not specified    Heart murmur    Obstructive sleep apnea    H/O aortic valve stenosis    Status post medial meniscus repair  2015    Collapsed lung    H/O lithotripsy        MEDICATIONS  (STANDING):  ascorbic acid 2000 milliGRAM(s) Oral once  chlorhexidine 0.12% Liquid 5 milliLiter(s) Swish and Spit once  chlorhexidine 4% Liquid 1 Application(s) Topical once  sodium chloride 0.9% lock flush 3 milliLiter(s) IV Push every 8 hours    MEDICATIONS  (PRN):        Labs:                        14.1   7.80  )-----------( 149      ( 03 May 2022 08:48 )             44.1     05-03    142  |  107  |  22  ----------------------------<  111<H>  4.7   |  24  |  0.93    Ca    9.1      03 May 2022 08:48    TPro  7.5  /  Alb  4.0  /  TBili  0.4  /  DBili  x   /  AST  17  /  ALT  16  /  AlkPhos  57  05-03    PT/INR - ( 03 May 2022 08:48 )   PT: 12.3 sec;   INR: 1.06 ratio         PTT - ( 03 May 2022 08:48 )  PTT:28.0 sec    Blood Type: ABO Interpretation: A (05-03 @ 09:03)    HGB A1C:   Prealbumin:   Pro-BNP: Serum Pro-Brain Natriuretic Peptide: 1023 pg/mL (05-03 @ 08:48)    Thyroid Panel:   MRSA:  / MSSA:       CXR:     EKG:    Carotid Duplex:        Echocardiogram:    Cardiac catheterization:      Gen: WN/WD NAD  Neuro: AAOx3, nonfocal  Pulm: CTA B/L  CV: RRR, S1S2  Abd: Soft, NT, ND +BS  Ext: No edema, + peripheral pulses      Pt has AICD/PPM [ ] Yes  [ ] No             Brand Name:  Pre-op Beta Blocker ordered within 24 hrs of surgery (CABG ONLY)?  [ ] Yes  [ ] No  If not, Why?  Type & Cross  [ ] Yes  [ ] No  NPO after Midnight [ ] Yes  [ ] No  Pre-op ABX ordered, to be taped on chart:  [ ] Yes  [ ] No     Hibiclens/Peridex ordered [ ] Yes  [ ] No  Intraop on Hold: PRBCs, CXR, OSCAR [ ]   Consent obtained  [ ] Yes  [ ] No   Cardiac Surgery Pre-op Note:    CC: Patient is a 66y old  Male who presents with a chief complaint of     preop AVR                                                                                                             Surgeon:  Dr Cameron    Procedure: (Date) (Procedure)   5/4   AVR    Allergies    penicillin (Urticaria; Rash)  pollen itchy eye (Eye Irritation)  sulfa drugs (Urticaria; Rash)    Intolerances        HPI:  66 year old male with no implantable cardiac devices and PMH of WAQAS -uses CPAP, htn, hld, AS, kidney stones, COVID infection Dec 2021 (not hospitalized) and pre DM presents today for LHC. Patient reports progressive WHALEY since 2018, at which time he was diagnosed with AS. Over the past 6 months approx he feels increasing WHALEY- even when walking the dog. Denies CP, dizziness or syncope. Repeat ECHO was done 3/14/2022 revealing min MR, severe AS , mild AR, EF 75%.  Patient was referred to Dr Micah Cameron for AVR scheduled for 5/4/2022. For [facility] today to evaluate coronaries prior to OHS.  (03 May 2022 08:14)      PAST MEDICAL & SURGICAL HISTORY:  Hypertension    Testosterone deficiency    H/O hyperlipidemia    Renal colic    Unilateral inguinal hernia with obstruction and without gangrene, recurrence not specified    Heart murmur    Obstructive sleep apnea    H/O aortic valve stenosis    Status post medial meniscus repair  2015    Collapsed lung    H/O lithotripsy        MEDICATIONS  (STANDING):  ascorbic acid 2000 milliGRAM(s) Oral once  chlorhexidine 0.12% Liquid 5 milliLiter(s) Swish and Spit once  chlorhexidine 4% Liquid 1 Application(s) Topical once  sodium chloride 0.9% lock flush 3 milliLiter(s) IV Push every 8 hours    MEDICATIONS  (PRN):        Labs:                        14.1   7.80  )-----------( 149      ( 03 May 2022 08:48 )             44.1     05-03    142  |  107  |  22  ----------------------------<  111<H>  4.7   |  24  |  0.93    Ca    9.1      03 May 2022 08:48    TPro  7.5  /  Alb  4.0  /  TBili  0.4  /  DBili  x   /  AST  17  /  ALT  16  /  AlkPhos  57  05-03    PT/INR - ( 03 May 2022 08:48 )   PT: 12.3 sec;   INR: 1.06 ratio         PTT - ( 03 May 2022 08:48 )  PTT:28.0 sec    Blood Type: ABO Interpretation: A (05-03 @ 09:03)  Pro-BNP: Serum Pro-Brain Natriuretic Peptide: 1023 pg/mL (05-03 @ 08:48)    Thyroid Panel:   MRSA:  / MSSA:    results  to follow      CXR:     EKG:        Echocardiogram:    Cardiac catheterization:      Gen: WN/WD NAD  Neuro: AAOx3, nonfocal  Pulm: CTA B/L  CV: RRR, S1S2  Abd: Soft, NT, ND +BS  Ext: No edema, + peripheral pulses      Pt has AICD/PPM   [ ] No          Pre-op Beta Blocker ordered within 24 hrs of surgery (CABG ONLY)?  [ ] Yes   Type & Cross  [ ] Yes  [ ] No  NPO after Midnight [ ] Yes  [ ] No  Pre-op ABX ordered, to be taped on chart:  [ ] Yes  [ ] No     Hibiclens/Peridex ordered [ ] Yes  [ ] No  Intraop on Hold: PRBCs, CXR, OSCAR [ ]   Consent obtained  [ ] Yes  [ ] No   Cardiac Surgery Pre-op Note:    CC: Patient is a 66y old  Male who presents with a chief complaint of     preop AVR                                                                                                             Surgeon:  Dr Cameron    Procedure: (Date) (Procedure)   5/4   AVR    Allergies    penicillin (Urticaria; Rash)  pollen itchy eye (Eye Irritation)  sulfa drugs (Urticaria; Rash)    Intolerances        HPI:  66 year old male with no implantable cardiac devices and PMH of WAQAS -uses CPAP, htn, hld, AS, kidney stones, COVID infection Dec 2021 (not hospitalized) and pre DM presents today for LHC. Patient reports progressive WHALEY since 2018, at which time he was diagnosed with AS. Over the past 6 months approx he feels increasing WHALEY- even when walking the dog. Denies CP, dizziness or syncope. Repeat ECHO was done 3/14/2022 revealing min MR, severe AS , mild AR, EF 75%.  Patient was referred to Dr Micah Cameron for AVR scheduled for 5/4/2022. For [facility] today to evaluate coronaries prior to OHS.  (03 May 2022 08:14)      PAST MEDICAL & SURGICAL HISTORY:  Hypertension    Testosterone deficiency    H/O hyperlipidemia    Renal colic    Unilateral inguinal hernia with obstruction and without gangrene, recurrence not specified    Heart murmur    Obstructive sleep apnea    H/O aortic valve stenosis    Status post medial meniscus repair  2015    Collapsed lung    H/O lithotripsy        MEDICATIONS  (STANDING):  ascorbic acid 2000 milliGRAM(s) Oral once  chlorhexidine 0.12% Liquid 5 milliLiter(s) Swish and Spit once  chlorhexidine 4% Liquid 1 Application(s) Topical once  sodium chloride 0.9% lock flush 3 milliLiter(s) IV Push every 8 hours    MEDICATIONS  (PRN):        Labs:                        14.1   7.80  )-----------( 149      ( 03 May 2022 08:48 )             44.1     05-03    142  |  107  |  22  ----------------------------<  111<H>  4.7   |  24  |  0.93    Ca    9.1      03 May 2022 08:48    TPro  7.5  /  Alb  4.0  /  TBili  0.4  /  DBili  x   /  AST  17  /  ALT  16  /  AlkPhos  57  05-03    PT/INR - ( 03 May 2022 08:48 )   PT: 12.3 sec;   INR: 1.06 ratio         PTT - ( 03 May 2022 08:48 )  PTT:28.0 sec    Blood Type: ABO Interpretation: A (05-03 @ 09:03)  Pro-BNP: Serum Pro-Brain Natriuretic Peptide: 1023 pg/mL (05-03 @ 08:48)    Thyroid Panel:   MRSA:  / MSSA:    results  to follow      CXR:   NAD    EKG:   NSR      Cardiac catheterization:     minimal stenosis of CORS      Gen: WN/WD NAD  Neuro: AAOx3, nonfocal  Pulm: CTA B/L  CV: RRR, S1S2  Abd: Soft, NT, ND +BS  Ext: No edema, + peripheral pulses      Pt has AICD/PPM   [ ] No          Pre-op Beta Blocker ordered within 24 hrs of surgery (CABG ONLY)?  [ ] Yes   Type & Cross  [ ] Yes    NPO after Midnight [ ] Yes    Pre-op ABX ordered, to be taped on chart:  [ ] Yes    Hibiclens/Peridex ordered [ ] Yes    Intraop on Hold: PRBCs, CXR, OSCAR [ ]   Consent obtained  [ ] Yes  [ ] No

## 2022-05-03 NOTE — PROGRESS NOTE ADULT - PROBLEM SELECTOR PLAN 1
NPO after midnight   for AVR NPO after midnight   for AVR         BB,  ator  and asa  off tele to OR  awaiting MRSA nares NPO after midnight   for AVR          ator      dc lisinopril  off tele to OR  awaiting MRSA nares

## 2022-05-03 NOTE — PRE-OP CHECKLIST - SELECT TESTS ORDERED
BMP/CBC/CMP/PT/PTT/INR/Hepatic Function/Type and Screen/Urinalysis/HCG/EKG/CXR/Results in MD note/COVID-19

## 2022-05-03 NOTE — H&P CARDIOLOGY - NSICDXPASTMEDICALHX_GEN_ALL_CORE_FT
PAST MEDICAL HISTORY:  H/O aortic valve stenosis     H/O hyperlipidemia     Heart murmur     Hypertension     Obstructive sleep apnea     Renal colic     Testosterone deficiency     Unilateral inguinal hernia with obstruction and without gangrene, recurrence not specified

## 2022-05-03 NOTE — H&P CARDIOLOGY - NSICDXPASTSURGICALHX_GEN_ALL_CORE_FT
PAST SURGICAL HISTORY:  Collapsed lung     H/O lithotripsy     Status post medial meniscus repair 2015

## 2022-05-03 NOTE — PRE-OP CHECKLIST - TAMPON REMOVED
Future Appointments       Provider Department Center    12/12/2018 9:55 AM Radha Castro M.D. Formerly [facility]        ESTABLISHED PATIENT PRE-VISIT PLANNING     Patient was contacted to complete PVP.     Note: Patient will not be contacted if there is no indication to call.     1.  Reviewed notes from the last few office visits within the medical group: Yes    2.  If any orders were placed at last visit or intended to be done for this visit (i.e. 6 mos follow-up), do we have Results/Consult Notes?        •  Labs - Labs ordered, NOT completed. Patient advised to complete prior to next appointment.       •  Imaging - Imaging was not ordered at last office visit.       •  Referrals - Referral ordered, patient has NOT been seen.    3. Is this appointment scheduled as a Hospital Follow-Up? No    4.  Immunizations were updated in [facility] using WebIZ?: Yes       •  Web Iz Recommendations: MMR , TDAP and ZOSTAVAX (Shingles)    5.  Patient is due for the following Health Maintenance Topics:   Health Maintenance Due   Topic Date Due   • COLONOSCOPY  04/06/2014   • IMM ZOSTER VACCINES (1 of 2) 04/06/2014   • IMM DTaP/Tdap/Td Vaccine (2 - Td) 01/01/2018       6.  MDX printed for Provider? NO    7.  Patient was informed to arrive 15 min prior to their scheduled appointment and bring in their medication bottles.  
n/a

## 2022-05-03 NOTE — H&P CARDIOLOGY - NSICDXFAMILYHX_GEN_ALL_CORE_FT
FAMILY HISTORY:  Mother  Still living? No  Family history of emphysema, Age at diagnosis: Age Unknown  Family history of heart attack, Age at diagnosis: Age Unknown    Sibling  Still living? Yes, Estimated age: 59  Family history of type 2 diabetes mellitus in brother, Age at diagnosis: Age Unknown

## 2022-05-03 NOTE — H&P CARDIOLOGY - HISTORY OF PRESENT ILLNESS
66 year old male with no implantable cardiac devices and PMH of WAQAS -uses CPAP, htn, hld, AS, kidney stones, COVID infection Dec 2021 (not hospitalized) and pre DM presents today for [facility]. Patient reports progressive WHALEY since 2018, at which time he was diagnosed with AS. Over the past 6 months approx he feels increasing WHALEY- even when walking the dog. Denies CP, dizziness or syncope. Repeat ECHO was done 3/14/2022 revealing min MR, severe AS , mild AR, EF 75%.  Patient was referred to Dr Micah Cameron for AVR scheduled for 5/4/2022. For [facility] today to evaluate coronaries prior to OHS.

## 2022-05-04 ENCOUNTER — RESULT REVIEW (OUTPATIENT)
Age: 67
End: 2022-05-04

## 2022-05-04 ENCOUNTER — APPOINTMENT (OUTPATIENT)
Dept: CARDIOTHORACIC SURGERY | Facility: HOSPITAL | Age: 67
End: 2022-05-04

## 2022-05-04 LAB
A1C WITH ESTIMATED AVERAGE GLUCOSE RESULT: 5.8 % — HIGH (ref 4–5.6)
ALBUMIN SERPL ELPH-MCNC: 3.5 G/DL — SIGNIFICANT CHANGE UP (ref 3.3–5)
ALP SERPL-CCNC: 46 U/L — SIGNIFICANT CHANGE UP (ref 40–120)
ALT FLD-CCNC: 13 U/L — SIGNIFICANT CHANGE UP (ref 10–45)
ANION GAP SERPL CALC-SCNC: 14 MMOL/L — SIGNIFICANT CHANGE UP (ref 5–17)
APTT BLD: 27.1 SEC — LOW (ref 27.5–35.5)
AST SERPL-CCNC: 36 U/L — SIGNIFICANT CHANGE UP (ref 10–40)
BASE EXCESS BLDV CALC-SCNC: -1.7 MMOL/L — SIGNIFICANT CHANGE UP (ref -2–2)
BASE EXCESS BLDV CALC-SCNC: -2 MMOL/L — SIGNIFICANT CHANGE UP (ref -2–2)
BASE EXCESS BLDV CALC-SCNC: -2.5 MMOL/L — LOW (ref -2–2)
BASE EXCESS BLDV CALC-SCNC: -3.1 MMOL/L — LOW (ref -2–2)
BASOPHILS # BLD AUTO: 0.04 K/UL — SIGNIFICANT CHANGE UP (ref 0–0.2)
BASOPHILS NFR BLD AUTO: 0.2 % — SIGNIFICANT CHANGE UP (ref 0–2)
BILIRUB SERPL-MCNC: 0.9 MG/DL — SIGNIFICANT CHANGE UP (ref 0.2–1.2)
BLOOD GAS VENOUS - CREATININE: SIGNIFICANT CHANGE UP MG/DL (ref 0.5–1.3)
BUN SERPL-MCNC: 20 MG/DL — SIGNIFICANT CHANGE UP (ref 7–23)
CA-I SERPL-SCNC: 0.92 MMOL/L — LOW (ref 1.15–1.33)
CA-I SERPL-SCNC: 0.95 MMOL/L — LOW (ref 1.15–1.33)
CA-I SERPL-SCNC: 0.96 MMOL/L — LOW (ref 1.15–1.33)
CA-I SERPL-SCNC: 0.97 MMOL/L — LOW (ref 1.15–1.33)
CALCIUM SERPL-MCNC: 9 MG/DL — SIGNIFICANT CHANGE UP (ref 8.4–10.5)
CHLORIDE BLDV-SCNC: 104 MMOL/L — SIGNIFICANT CHANGE UP (ref 96–108)
CHLORIDE BLDV-SCNC: 105 MMOL/L — SIGNIFICANT CHANGE UP (ref 96–108)
CHLORIDE BLDV-SCNC: 106 MMOL/L — SIGNIFICANT CHANGE UP (ref 96–108)
CHLORIDE BLDV-SCNC: 106 MMOL/L — SIGNIFICANT CHANGE UP (ref 96–108)
CHLORIDE SERPL-SCNC: 106 MMOL/L — SIGNIFICANT CHANGE UP (ref 96–108)
CK MB BLD-MCNC: 14.2 % — HIGH (ref 0–3.5)
CK MB CFR SERPL CALC: 43.1 NG/ML — HIGH (ref 0–6.7)
CK SERPL-CCNC: 304 U/L — HIGH (ref 30–200)
CO2 BLDV-SCNC: 25 MMOL/L — SIGNIFICANT CHANGE UP (ref 22–26)
CO2 BLDV-SCNC: 26 MMOL/L — SIGNIFICANT CHANGE UP (ref 22–26)
CO2 BLDV-SCNC: 27 MMOL/L — HIGH (ref 22–26)
CO2 BLDV-SCNC: 27 MMOL/L — HIGH (ref 22–26)
CO2 SERPL-SCNC: 21 MMOL/L — LOW (ref 22–31)
CREAT SERPL-MCNC: 0.83 MG/DL — SIGNIFICANT CHANGE UP (ref 0.5–1.3)
EGFR: 96 ML/MIN/1.73M2 — SIGNIFICANT CHANGE UP
EOSINOPHIL # BLD AUTO: 0.03 K/UL — SIGNIFICANT CHANGE UP (ref 0–0.5)
EOSINOPHIL NFR BLD AUTO: 0.1 % — SIGNIFICANT CHANGE UP (ref 0–6)
ESTIMATED AVERAGE GLUCOSE: 120 MG/DL — HIGH (ref 68–114)
FIBRINOGEN PPP-MCNC: 393 MG/DL — SIGNIFICANT CHANGE UP (ref 330–520)
GAS PNL BLDA: SIGNIFICANT CHANGE UP
GAS PNL BLDV: 135 MMOL/L — LOW (ref 136–145)
GAS PNL BLDV: 137 MMOL/L — SIGNIFICANT CHANGE UP (ref 136–145)
GAS PNL BLDV: 137 MMOL/L — SIGNIFICANT CHANGE UP (ref 136–145)
GAS PNL BLDV: 138 MMOL/L — SIGNIFICANT CHANGE UP (ref 136–145)
GAS PNL BLDV: SIGNIFICANT CHANGE UP
GLUCOSE BLDC GLUCOMTR-MCNC: 129 MG/DL — HIGH (ref 70–99)
GLUCOSE BLDC GLUCOMTR-MCNC: 142 MG/DL — HIGH (ref 70–99)
GLUCOSE BLDC GLUCOMTR-MCNC: 144 MG/DL — HIGH (ref 70–99)
GLUCOSE BLDC GLUCOMTR-MCNC: 147 MG/DL — HIGH (ref 70–99)
GLUCOSE BLDC GLUCOMTR-MCNC: 156 MG/DL — HIGH (ref 70–99)
GLUCOSE BLDC GLUCOMTR-MCNC: 159 MG/DL — HIGH (ref 70–99)
GLUCOSE BLDC GLUCOMTR-MCNC: 163 MG/DL — HIGH (ref 70–99)
GLUCOSE BLDC GLUCOMTR-MCNC: 164 MG/DL — HIGH (ref 70–99)
GLUCOSE BLDC GLUCOMTR-MCNC: 166 MG/DL — HIGH (ref 70–99)
GLUCOSE BLDC GLUCOMTR-MCNC: 172 MG/DL — HIGH (ref 70–99)
GLUCOSE BLDC GLUCOMTR-MCNC: 173 MG/DL — HIGH (ref 70–99)
GLUCOSE BLDV-MCNC: 142 MG/DL — HIGH (ref 70–99)
GLUCOSE BLDV-MCNC: 166 MG/DL — HIGH (ref 70–99)
GLUCOSE BLDV-MCNC: 177 MG/DL — HIGH (ref 70–99)
GLUCOSE BLDV-MCNC: 192 MG/DL — HIGH (ref 70–99)
GLUCOSE SERPL-MCNC: 170 MG/DL — HIGH (ref 70–99)
HCO3 BLDV-SCNC: 23 MMOL/L — SIGNIFICANT CHANGE UP (ref 22–29)
HCO3 BLDV-SCNC: 24 MMOL/L — SIGNIFICANT CHANGE UP (ref 22–29)
HCO3 BLDV-SCNC: 25 MMOL/L — SIGNIFICANT CHANGE UP (ref 22–29)
HCO3 BLDV-SCNC: 26 MMOL/L — SIGNIFICANT CHANGE UP (ref 22–29)
HCT VFR BLD CALC: 41 % — SIGNIFICANT CHANGE UP (ref 39–50)
HCT VFR BLDA CALC: 29 % — LOW (ref 39–51)
HCT VFR BLDA CALC: 30 % — LOW (ref 39–51)
HCT VFR BLDA CALC: 31 % — LOW (ref 39–51)
HCT VFR BLDA CALC: 32 % — LOW (ref 39–51)
HEPARINASE TEG R TIME: 4.3 MIN — SIGNIFICANT CHANGE UP (ref 4.3–8.3)
HGB BLD CALC-MCNC: 10.1 G/DL — LOW (ref 12.6–17.4)
HGB BLD CALC-MCNC: 10.2 G/DL — LOW (ref 12.6–17.4)
HGB BLD CALC-MCNC: 10.5 G/DL — LOW (ref 12.6–17.4)
HGB BLD CALC-MCNC: 9.8 G/DL — LOW (ref 12.6–17.4)
HGB BLD-MCNC: 13.5 G/DL — SIGNIFICANT CHANGE UP (ref 13–17)
IMM GRANULOCYTES NFR BLD AUTO: 1.1 % — SIGNIFICANT CHANGE UP (ref 0–1.5)
INR BLD: 1.18 RATIO — HIGH (ref 0.88–1.16)
LACTATE BLDV-MCNC: 1.2 MMOL/L — SIGNIFICANT CHANGE UP (ref 0.7–2)
LACTATE BLDV-MCNC: 1.2 MMOL/L — SIGNIFICANT CHANGE UP (ref 0.7–2)
LACTATE BLDV-MCNC: 1.4 MMOL/L — SIGNIFICANT CHANGE UP (ref 0.7–2)
LACTATE BLDV-MCNC: 1.9 MMOL/L — SIGNIFICANT CHANGE UP (ref 0.7–2)
LYMPHOCYTES # BLD AUTO: 10.1 % — LOW (ref 13–44)
LYMPHOCYTES # BLD AUTO: 2.28 K/UL — SIGNIFICANT CHANGE UP (ref 1–3.3)
MAGNESIUM SERPL-MCNC: 2.8 MG/DL — HIGH (ref 1.6–2.6)
MCHC RBC-ENTMCNC: 27.2 PG — SIGNIFICANT CHANGE UP (ref 27–34)
MCHC RBC-ENTMCNC: 32.9 GM/DL — SIGNIFICANT CHANGE UP (ref 32–36)
MCV RBC AUTO: 82.5 FL — SIGNIFICANT CHANGE UP (ref 80–100)
MONOCYTES # BLD AUTO: 0.94 K/UL — HIGH (ref 0–0.9)
MONOCYTES NFR BLD AUTO: 4.2 % — SIGNIFICANT CHANGE UP (ref 2–14)
MRSA PCR RESULT.: SIGNIFICANT CHANGE UP
NEUTROPHILS # BLD AUTO: 19.07 K/UL — HIGH (ref 1.8–7.4)
NEUTROPHILS NFR BLD AUTO: 84.3 % — HIGH (ref 43–77)
NRBC # BLD: 0 /100 WBCS — SIGNIFICANT CHANGE UP (ref 0–0)
PCO2 BLDV: 46 MMHG — SIGNIFICANT CHANGE UP (ref 42–55)
PCO2 BLDV: 49 MMHG — SIGNIFICANT CHANGE UP (ref 42–55)
PCO2 BLDV: 52 MMHG — SIGNIFICANT CHANGE UP (ref 42–55)
PCO2 BLDV: 56 MMHG — HIGH (ref 42–55)
PH BLDV: 7.27 — LOW (ref 7.32–7.43)
PH BLDV: 7.29 — LOW (ref 7.32–7.43)
PH BLDV: 7.3 — LOW (ref 7.32–7.43)
PH BLDV: 7.31 — LOW (ref 7.32–7.43)
PHOSPHATE SERPL-MCNC: 3.1 MG/DL — SIGNIFICANT CHANGE UP (ref 2.5–4.5)
PLATELET # BLD AUTO: 127 K/UL — LOW (ref 150–400)
PO2 BLDV: 103 MMHG — HIGH (ref 25–45)
PO2 BLDV: 58 MMHG — HIGH (ref 25–45)
PO2 BLDV: 68 MMHG — HIGH (ref 25–45)
PO2 BLDV: 86 MMHG — HIGH (ref 25–45)
POTASSIUM BLDV-SCNC: 4.6 MMOL/L — SIGNIFICANT CHANGE UP (ref 3.5–5.1)
POTASSIUM BLDV-SCNC: 5.2 MMOL/L — HIGH (ref 3.5–5.1)
POTASSIUM BLDV-SCNC: 5.5 MMOL/L — HIGH (ref 3.5–5.1)
POTASSIUM BLDV-SCNC: 5.5 MMOL/L — HIGH (ref 3.5–5.1)
POTASSIUM SERPL-MCNC: 4.3 MMOL/L — SIGNIFICANT CHANGE UP (ref 3.5–5.3)
POTASSIUM SERPL-SCNC: 4.3 MMOL/L — SIGNIFICANT CHANGE UP (ref 3.5–5.3)
PROT SERPL-MCNC: 5.8 G/DL — LOW (ref 6–8.3)
PROTHROM AB SERPL-ACNC: 13.6 SEC — HIGH (ref 10.5–13.4)
RAPIDTEG MAXIMUM AMPLITUDE: 58.2 MM — SIGNIFICANT CHANGE UP (ref 52–70)
RBC # BLD: 4.97 M/UL — SIGNIFICANT CHANGE UP (ref 4.2–5.8)
RBC # FLD: 13.9 % — SIGNIFICANT CHANGE UP (ref 10.3–14.5)
S AUREUS DNA NOSE QL NAA+PROBE: SIGNIFICANT CHANGE UP
SAO2 % BLDV: 88.8 % — HIGH (ref 67–88)
SAO2 % BLDV: 93.9 % — HIGH (ref 67–88)
SAO2 % BLDV: 98.8 % — HIGH (ref 67–88)
SAO2 % BLDV: 98.9 % — HIGH (ref 67–88)
SODIUM SERPL-SCNC: 141 MMOL/L — SIGNIFICANT CHANGE UP (ref 135–145)
TEG FUNCTIONAL FIBRINOGEN: 20 MM — SIGNIFICANT CHANGE UP (ref 15–32)
TEG MAXIMUM AMPLITUDE: 59.9 MM — SIGNIFICANT CHANGE UP (ref 52–69)
TEG REACTION TIME: 4.8 MIN — SIGNIFICANT CHANGE UP (ref 4.6–9.1)
TROPONIN T, HIGH SENSITIVITY RESULT: 464 NG/L — HIGH (ref 0–51)
WBC # BLD: 22.61 K/UL — HIGH (ref 3.8–10.5)
WBC # FLD AUTO: 22.61 K/UL — HIGH (ref 3.8–10.5)

## 2022-05-04 PROCEDURE — 99291 CRITICAL CARE FIRST HOUR: CPT

## 2022-05-04 PROCEDURE — 71045 X-RAY EXAM CHEST 1 VIEW: CPT | Mod: 26

## 2022-05-04 PROCEDURE — 33405 REPLACEMENT AORTIC VALVE OPN: CPT | Mod: AS

## 2022-05-04 PROCEDURE — 33405 REPLACEMENT AORTIC VALVE OPN: CPT

## 2022-05-04 PROCEDURE — 88305 TISSUE EXAM BY PATHOLOGIST: CPT | Mod: 26

## 2022-05-04 DEVICE — CATH VENT LEFT HEART SILICONE 20FR NON-VENTED: Type: IMPLANTABLE DEVICE | Status: FUNCTIONAL

## 2022-05-04 DEVICE — KIT CVC 2LUM MAC 9FR CHG: Type: IMPLANTABLE DEVICE | Status: FUNCTIONAL

## 2022-05-04 DEVICE — CHEST DRAIN THORACIC ARGYLE PVC 36FR STRAIGHT: Type: IMPLANTABLE DEVICE | Status: FUNCTIONAL

## 2022-05-04 DEVICE — CANNULA VENOUS 2 STAGE THIN FLEX 36/46FR X 1/2" WITH RETURN DIAL: Type: IMPLANTABLE DEVICE | Status: FUNCTIONAL

## 2022-05-04 DEVICE — LIGATING CLIPS WECK HORIZON MEDIUM (BLUE) 24: Type: IMPLANTABLE DEVICE | Status: FUNCTIONAL

## 2022-05-04 DEVICE — CANNULA AORTIC ROOT 14G X 14CM FLANGED: Type: IMPLANTABLE DEVICE | Status: FUNCTIONAL

## 2022-05-04 DEVICE — LIGATING CLIPS WECK HORIZON SMALL-WIDE (RED) 24: Type: IMPLANTABLE DEVICE | Status: FUNCTIONAL

## 2022-05-04 DEVICE — CANNULA PERFUSION  BALLOON 6MM: Type: IMPLANTABLE DEVICE | Status: FUNCTIONAL

## 2022-05-04 DEVICE — CHEST DRAIN THORACIC ARGYLE PVC 32FR RIGHT ANGLE: Type: IMPLANTABLE DEVICE | Status: FUNCTIONAL

## 2022-05-04 DEVICE — CANNULA ARTERIAL STRAIGHT 20FR X 3/8" VENTED: Type: IMPLANTABLE DEVICE | Status: FUNCTIONAL

## 2022-05-04 DEVICE — VALVE AORTIC INSPIRIS RESILIA 23MM: Type: IMPLANTABLE DEVICE | Status: FUNCTIONAL

## 2022-05-04 DEVICE — KIT A-LINE 1LUM 20G X 12CM SAFE KIT: Type: IMPLANTABLE DEVICE | Status: FUNCTIONAL

## 2022-05-04 RX ORDER — SODIUM CHLORIDE 9 MG/ML
500 INJECTION, SOLUTION INTRAVENOUS ONCE
Refills: 0 | Status: COMPLETED | OUTPATIENT
Start: 2022-05-04 | End: 2022-05-04

## 2022-05-04 RX ORDER — ASCORBIC ACID 60 MG
500 TABLET,CHEWABLE ORAL
Refills: 0 | Status: COMPLETED | OUTPATIENT
Start: 2022-05-04 | End: 2022-05-09

## 2022-05-04 RX ORDER — GABAPENTIN 400 MG/1
100 CAPSULE ORAL EVERY 8 HOURS
Refills: 0 | Status: COMPLETED | OUTPATIENT
Start: 2022-05-04 | End: 2022-05-09

## 2022-05-04 RX ORDER — SENNA PLUS 8.6 MG/1
2 TABLET ORAL AT BEDTIME
Refills: 0 | Status: DISCONTINUED | OUTPATIENT
Start: 2022-05-05 | End: 2022-05-09

## 2022-05-04 RX ORDER — POTASSIUM CHLORIDE 20 MEQ
10 PACKET (EA) ORAL
Refills: 0 | Status: DISCONTINUED | OUTPATIENT
Start: 2022-05-04 | End: 2022-05-05

## 2022-05-04 RX ORDER — ONDANSETRON 8 MG/1
4 TABLET, FILM COATED ORAL ONCE
Refills: 0 | Status: COMPLETED | OUTPATIENT
Start: 2022-05-04 | End: 2022-05-04

## 2022-05-04 RX ORDER — ALBUMIN HUMAN 25 %
250 VIAL (ML) INTRAVENOUS
Refills: 0 | Status: COMPLETED | OUTPATIENT
Start: 2022-05-04 | End: 2022-05-04

## 2022-05-04 RX ORDER — CHLORHEXIDINE GLUCONATE 213 G/1000ML
5 SOLUTION TOPICAL
Refills: 0 | Status: DISCONTINUED | OUTPATIENT
Start: 2022-05-04 | End: 2022-05-09

## 2022-05-04 RX ORDER — DEXMEDETOMIDINE HYDROCHLORIDE IN 0.9% SODIUM CHLORIDE 4 UG/ML
0.3 INJECTION INTRAVENOUS
Qty: 200 | Refills: 0 | Status: DISCONTINUED | OUTPATIENT
Start: 2022-05-04 | End: 2022-05-05

## 2022-05-04 RX ORDER — ASPIRIN/CALCIUM CARB/MAGNESIUM 324 MG
81 TABLET ORAL DAILY
Refills: 0 | Status: DISCONTINUED | OUTPATIENT
Start: 2022-05-04 | End: 2022-05-09

## 2022-05-04 RX ORDER — NICARDIPINE HYDROCHLORIDE 30 MG/1
5 CAPSULE, EXTENDED RELEASE ORAL
Qty: 40 | Refills: 0 | Status: DISCONTINUED | OUTPATIENT
Start: 2022-05-04 | End: 2022-05-05

## 2022-05-04 RX ORDER — CALCIUM GLUCONATE 100 MG/ML
1 VIAL (ML) INTRAVENOUS ONCE
Refills: 0 | Status: COMPLETED | OUTPATIENT
Start: 2022-05-04 | End: 2022-05-04

## 2022-05-04 RX ORDER — ACETAMINOPHEN 500 MG
1000 TABLET ORAL ONCE
Refills: 0 | Status: COMPLETED | OUTPATIENT
Start: 2022-05-04 | End: 2022-05-04

## 2022-05-04 RX ORDER — OXYCODONE HYDROCHLORIDE 5 MG/1
5 TABLET ORAL EVERY 4 HOURS
Refills: 0 | Status: DISCONTINUED | OUTPATIENT
Start: 2022-05-04 | End: 2022-05-09

## 2022-05-04 RX ORDER — CHLORHEXIDINE GLUCONATE 213 G/1000ML
1 SOLUTION TOPICAL DAILY
Refills: 0 | Status: DISCONTINUED | OUTPATIENT
Start: 2022-05-04 | End: 2022-05-09

## 2022-05-04 RX ORDER — ACETAMINOPHEN 500 MG
650 TABLET ORAL EVERY 6 HOURS
Refills: 0 | Status: DISCONTINUED | OUTPATIENT
Start: 2022-05-07 | End: 2022-05-09

## 2022-05-04 RX ORDER — POLYETHYLENE GLYCOL 3350 17 G/17G
17 POWDER, FOR SOLUTION ORAL DAILY
Refills: 0 | Status: DISCONTINUED | OUTPATIENT
Start: 2022-05-05 | End: 2022-05-09

## 2022-05-04 RX ORDER — AMIODARONE HYDROCHLORIDE 400 MG/1
400 TABLET ORAL
Refills: 0 | Status: DISCONTINUED | OUTPATIENT
Start: 2022-05-04 | End: 2022-05-04

## 2022-05-04 RX ORDER — INSULIN HUMAN 100 [IU]/ML
3 INJECTION, SOLUTION SUBCUTANEOUS
Qty: 100 | Refills: 0 | Status: DISCONTINUED | OUTPATIENT
Start: 2022-05-04 | End: 2022-05-06

## 2022-05-04 RX ORDER — MEPERIDINE HYDROCHLORIDE 50 MG/ML
25 INJECTION INTRAMUSCULAR; INTRAVENOUS; SUBCUTANEOUS ONCE
Refills: 0 | Status: DISCONTINUED | OUTPATIENT
Start: 2022-05-04 | End: 2022-05-05

## 2022-05-04 RX ORDER — POTASSIUM CHLORIDE 20 MEQ
10 PACKET (EA) ORAL
Refills: 0 | Status: COMPLETED | OUTPATIENT
Start: 2022-05-04 | End: 2022-05-04

## 2022-05-04 RX ORDER — HYDROMORPHONE HYDROCHLORIDE 2 MG/ML
0.5 INJECTION INTRAMUSCULAR; INTRAVENOUS; SUBCUTANEOUS ONCE
Refills: 0 | Status: DISCONTINUED | OUTPATIENT
Start: 2022-05-04 | End: 2022-05-04

## 2022-05-04 RX ORDER — OXYCODONE HYDROCHLORIDE 5 MG/1
10 TABLET ORAL EVERY 4 HOURS
Refills: 0 | Status: DISCONTINUED | OUTPATIENT
Start: 2022-05-04 | End: 2022-05-09

## 2022-05-04 RX ORDER — SODIUM CHLORIDE 9 MG/ML
1000 INJECTION INTRAMUSCULAR; INTRAVENOUS; SUBCUTANEOUS
Refills: 0 | Status: DISCONTINUED | OUTPATIENT
Start: 2022-05-04 | End: 2022-05-09

## 2022-05-04 RX ORDER — PANTOPRAZOLE SODIUM 20 MG/1
40 TABLET, DELAYED RELEASE ORAL DAILY
Refills: 0 | Status: DISCONTINUED | OUTPATIENT
Start: 2022-05-04 | End: 2022-05-05

## 2022-05-04 RX ORDER — CHLORHEXIDINE GLUCONATE 213 G/1000ML
15 SOLUTION TOPICAL EVERY 12 HOURS
Refills: 0 | Status: DISCONTINUED | OUTPATIENT
Start: 2022-05-04 | End: 2022-05-04

## 2022-05-04 RX ORDER — CEFUROXIME AXETIL 250 MG
1500 TABLET ORAL EVERY 8 HOURS
Refills: 0 | Status: COMPLETED | OUTPATIENT
Start: 2022-05-04 | End: 2022-05-06

## 2022-05-04 RX ORDER — NOREPINEPHRINE BITARTRATE/D5W 8 MG/250ML
0.05 PLASTIC BAG, INJECTION (ML) INTRAVENOUS
Qty: 8 | Refills: 0 | Status: DISCONTINUED | OUTPATIENT
Start: 2022-05-04 | End: 2022-05-05

## 2022-05-04 RX ORDER — DEXTROSE 50 % IN WATER 50 %
25 SYRINGE (ML) INTRAVENOUS
Refills: 0 | Status: DISCONTINUED | OUTPATIENT
Start: 2022-05-04 | End: 2022-05-05

## 2022-05-04 RX ORDER — DEXTROSE 50 % IN WATER 50 %
50 SYRINGE (ML) INTRAVENOUS
Refills: 0 | Status: DISCONTINUED | OUTPATIENT
Start: 2022-05-04 | End: 2022-05-05

## 2022-05-04 RX ORDER — ACETAMINOPHEN 500 MG
650 TABLET ORAL EVERY 6 HOURS
Refills: 0 | Status: COMPLETED | OUTPATIENT
Start: 2022-05-04 | End: 2022-05-07

## 2022-05-04 RX ORDER — ALBUMIN HUMAN 25 %
250 VIAL (ML) INTRAVENOUS ONCE
Refills: 0 | Status: COMPLETED | OUTPATIENT
Start: 2022-05-04 | End: 2022-05-04

## 2022-05-04 RX ORDER — HYDROMORPHONE HYDROCHLORIDE 2 MG/ML
0.5 INJECTION INTRAMUSCULAR; INTRAVENOUS; SUBCUTANEOUS EVERY 6 HOURS
Refills: 0 | Status: DISCONTINUED | OUTPATIENT
Start: 2022-05-04 | End: 2022-05-06

## 2022-05-04 RX ADMIN — Medication 125 MILLILITER(S): at 17:06

## 2022-05-04 RX ADMIN — ONDANSETRON 4 MILLIGRAM(S): 8 TABLET, FILM COATED ORAL at 17:49

## 2022-05-04 RX ADMIN — INSULIN HUMAN 3 UNIT(S)/HR: 100 INJECTION, SOLUTION SUBCUTANEOUS at 17:08

## 2022-05-04 RX ADMIN — Medication 125 MILLILITER(S): at 17:49

## 2022-05-04 RX ADMIN — Medication 1000 MILLIGRAM(S): at 06:11

## 2022-05-04 RX ADMIN — Medication 500 MILLIGRAM(S): at 17:08

## 2022-05-04 RX ADMIN — SODIUM CHLORIDE 10 MILLILITER(S): 9 INJECTION INTRAMUSCULAR; INTRAVENOUS; SUBCUTANEOUS at 22:14

## 2022-05-04 RX ADMIN — Medication 100 MILLIGRAM(S): at 15:16

## 2022-05-04 RX ADMIN — INSULIN HUMAN 3 UNIT(S)/HR: 100 INJECTION, SOLUTION SUBCUTANEOUS at 19:32

## 2022-05-04 RX ADMIN — Medication 100 MILLIEQUIVALENT(S): at 17:11

## 2022-05-04 RX ADMIN — Medication 1000 MILLIGRAM(S): at 18:20

## 2022-05-04 RX ADMIN — HYDROMORPHONE HYDROCHLORIDE 0.5 MILLIGRAM(S): 2 INJECTION INTRAMUSCULAR; INTRAVENOUS; SUBCUTANEOUS at 19:31

## 2022-05-04 RX ADMIN — PANTOPRAZOLE SODIUM 40 MILLIGRAM(S): 20 TABLET, DELAYED RELEASE ORAL at 17:08

## 2022-05-04 RX ADMIN — Medication 81 MILLIGRAM(S): at 17:07

## 2022-05-04 RX ADMIN — GABAPENTIN 100 MILLIGRAM(S): 400 CAPSULE ORAL at 22:21

## 2022-05-04 RX ADMIN — SODIUM CHLORIDE 2000 MILLILITER(S): 9 INJECTION, SOLUTION INTRAVENOUS at 15:17

## 2022-05-04 RX ADMIN — Medication 100 MILLIEQUIVALENT(S): at 16:08

## 2022-05-04 RX ADMIN — SODIUM CHLORIDE 2000 MILLILITER(S): 9 INJECTION, SOLUTION INTRAVENOUS at 16:44

## 2022-05-04 RX ADMIN — NICARDIPINE HYDROCHLORIDE 25 MG/HR: 30 CAPSULE, EXTENDED RELEASE ORAL at 19:31

## 2022-05-04 RX ADMIN — Medication 100 GRAM(S): at 20:38

## 2022-05-04 RX ADMIN — Medication 125 MILLILITER(S): at 20:38

## 2022-05-04 RX ADMIN — Medication 650 MILLIGRAM(S): at 17:07

## 2022-05-04 RX ADMIN — SODIUM CHLORIDE 3 MILLILITER(S): 9 INJECTION INTRAMUSCULAR; INTRAVENOUS; SUBCUTANEOUS at 04:10

## 2022-05-04 RX ADMIN — GABAPENTIN 300 MILLIGRAM(S): 400 CAPSULE ORAL at 06:11

## 2022-05-04 RX ADMIN — HYDROMORPHONE HYDROCHLORIDE 0.5 MILLIGRAM(S): 2 INJECTION INTRAMUSCULAR; INTRAVENOUS; SUBCUTANEOUS at 16:15

## 2022-05-04 RX ADMIN — NICARDIPINE HYDROCHLORIDE 25 MG/HR: 30 CAPSULE, EXTENDED RELEASE ORAL at 17:08

## 2022-05-04 RX ADMIN — Medication 1000 MILLIGRAM(S): at 06:46

## 2022-05-04 RX ADMIN — ONDANSETRON 4 MILLIGRAM(S): 8 TABLET, FILM COATED ORAL at 19:31

## 2022-05-04 RX ADMIN — DEXMEDETOMIDINE HYDROCHLORIDE IN 0.9% SODIUM CHLORIDE 7.15 MICROGRAM(S)/KG/HR: 4 INJECTION INTRAVENOUS at 21:45

## 2022-05-04 RX ADMIN — HYDROMORPHONE HYDROCHLORIDE 0.5 MILLIGRAM(S): 2 INJECTION INTRAMUSCULAR; INTRAVENOUS; SUBCUTANEOUS at 16:00

## 2022-05-04 RX ADMIN — Medication 100 MILLIEQUIVALENT(S): at 16:44

## 2022-05-04 RX ADMIN — CHLORHEXIDINE GLUCONATE 5 MILLILITER(S): 213 SOLUTION TOPICAL at 06:11

## 2022-05-04 RX ADMIN — SODIUM CHLORIDE 9999 MILLILITER(S): 9 INJECTION, SOLUTION INTRAVENOUS at 19:56

## 2022-05-04 RX ADMIN — Medication 400 MILLIGRAM(S): at 17:50

## 2022-05-04 RX ADMIN — CHLORHEXIDINE GLUCONATE 1 APPLICATION(S): 213 SOLUTION TOPICAL at 18:00

## 2022-05-04 RX ADMIN — HYDROMORPHONE HYDROCHLORIDE 0.5 MILLIGRAM(S): 2 INJECTION INTRAMUSCULAR; INTRAVENOUS; SUBCUTANEOUS at 20:15

## 2022-05-04 NOTE — PRE-ANESTHESIA EVALUATION ADULT - NSRADCARDRESULTSFT_GEN_ALL_CORE
Diagnostic Findings:     Coronary Angiography   The coronary circulation is right dominant.      LM   Left main artery: There is a 10 % stenosis.      LAD   Proximal left anterior descending: There is a 20 % stenosis. First  diagonal: Thesegment is average sized. Angiography shows  minor irregularities. Second diagonal: The segment is average sized.  Angiography shows minor irregularities.    CX   Proximal circumflex: There is a 10 % stenosis. First obtuse marginal:  The segment is average sized. Angiography shows minor  irregularities.      RCA     Patient: HUGH SCOTT          MRN: [id number]  Study Date: 05/03/2022   12:39 PM           Right coronary artery: The segment is average sized. Angiography shows  minor irregularities. Right posterior descending  artery: The segment is small. Angiography shows no disease. First  right posterolateral: The segment is average sized.  Angiography shows no disease.

## 2022-05-04 NOTE — BRIEF OPERATIVE NOTE - NSICDXBRIEFPOSTOP_GEN_ALL_CORE_FT
POST-OP DIAGNOSIS:  Aortic stenosis 04-May-2022 13:32:44  Onesimo Montes  Aortic regurgitation 04-May-2022 13:32:54  Onesimo Montes

## 2022-05-04 NOTE — BRIEF OPERATIVE NOTE - NSICDXBRIEFPREOP_GEN_ALL_CORE_FT
PRE-OP DIAGNOSIS:  Aortic stenosis 04-May-2022 13:32:24  Onesimo Montes  Aortic regurgitation 04-May-2022 13:32:33  Onesimo Montes

## 2022-05-04 NOTE — AIRWAY REMOVAL NOTE  ADULT & PEDS - ARTIFICAL AIRWAY REMOVAL COMMENTS
Written order for extubation verified. The patient was identified by full name and birth date compared to the identification band. Present during the procedure was LOYDA Hollins.

## 2022-05-04 NOTE — PROGRESS NOTE ADULT - SUBJECTIVE AND OBJECTIVE BOX
HUGH SCOTT   MRN#: [id number]     The patient is a 66y Male who was seen, evaluated, & examined with the CTICU staff on rounds and later in the day with a multidisciplinary care plan formulated & implemented.  All available clinical, laboratory, radiographic, pharmacologic, and electrocardiographic data were reviewed & analyzed.      The patient was in the CTICU in critical condition at risk for imminent decompensation secondary to persistent cardiopulmonary dysfunction, hemodynamically significant hypovolemia-shock, vasogenic shock, and stress hyperglycemia.      Respiratory status required full ventilatory support, the following of ABG’s with A-line monitoring, continuous pulse oximetry monitoring for support & to evaluate for & prevent further decompensation secondary to persistent cardiopulmonary dysfunction S/P tissue AVR for AS/AI.    Invasive hemodynamic monitoring with an A-line was required for the following of continuous MAP/BP monitoring to ensure adequate cardiovascular support and to evaluate for & help prevent decompensation while receiving intermittent volume expansion and an IV Levophed drip secondary to combined vasogenic-hemodynamically significant hypovolemia-shock.    Metabolic stability, stress hyperglycemia, & infection prophylaxis required an IV regular Insulin drip & the following of serial glucose levels to help achieve & maintain euglycemia.  Based upon my evaluation and review I maintained the current metabolic therapy.    Patient required critical care management and is at risk for life threatening decompensation  I provided 35 minutes of non-continuous care to the patient.

## 2022-05-04 NOTE — PRE-ANESTHESIA EVALUATION ADULT - NSANTHAIRWAYFT_ENT_ALL_CORE
Good mouth opening, FROM at neck, TM disance > 6 cm Good mouth opening, FROM at neck, TM distance > 6 cm

## 2022-05-04 NOTE — PRE-ANESTHESIA EVALUATION ADULT - NSANTHPMHFT_GEN_ALL_CORE
66M PMH HTN HLD WAQAS (+CPAP) with Dyspnea on exertion x3-4 years with acute worsening over the last 2-3 months scheduled for aortic valve replacement.

## 2022-05-04 NOTE — BRIEF OPERATIVE NOTE - COMMENTS
EBL n/a due to cell saver and cardiotomy suctions  Drips: Levo, Precedex  I first assisted for the entirety of the case, including sternotomy, cardiopulmonary bypass, placement of Aortic Valve and closing.  There were no residents/fellows available to assist for the case.

## 2022-05-04 NOTE — BRIEF OPERATIVE NOTE - OPERATION/FINDINGS
Critical AS and moderate-severe AI. Calcified trileaflet aortic valve  Size 23mm Inspiris bioprosthetic aortic valve replacement  EF 50%, mild MR, no PVL

## 2022-05-05 DIAGNOSIS — Z95.2 PRESENCE OF PROSTHETIC HEART VALVE: ICD-10-CM

## 2022-05-05 LAB
ALBUMIN SERPL ELPH-MCNC: 4 G/DL — SIGNIFICANT CHANGE UP (ref 3.3–5)
ALP SERPL-CCNC: 35 U/L — LOW (ref 40–120)
ALT FLD-CCNC: 12 U/L — SIGNIFICANT CHANGE UP (ref 10–45)
ANION GAP SERPL CALC-SCNC: 12 MMOL/L — SIGNIFICANT CHANGE UP (ref 5–17)
AST SERPL-CCNC: 35 U/L — SIGNIFICANT CHANGE UP (ref 10–40)
BILIRUB SERPL-MCNC: 0.5 MG/DL — SIGNIFICANT CHANGE UP (ref 0.2–1.2)
BUN SERPL-MCNC: 20 MG/DL — SIGNIFICANT CHANGE UP (ref 7–23)
CALCIUM SERPL-MCNC: 8.7 MG/DL — SIGNIFICANT CHANGE UP (ref 8.4–10.5)
CHLORIDE SERPL-SCNC: 105 MMOL/L — SIGNIFICANT CHANGE UP (ref 96–108)
CO2 SERPL-SCNC: 23 MMOL/L — SIGNIFICANT CHANGE UP (ref 22–31)
CREAT SERPL-MCNC: 0.89 MG/DL — SIGNIFICANT CHANGE UP (ref 0.5–1.3)
EGFR: 94 ML/MIN/1.73M2 — SIGNIFICANT CHANGE UP
GAS PNL BLDA: SIGNIFICANT CHANGE UP
GAS PNL BLDA: SIGNIFICANT CHANGE UP
GLUCOSE BLDC GLUCOMTR-MCNC: 104 MG/DL — HIGH (ref 70–99)
GLUCOSE BLDC GLUCOMTR-MCNC: 129 MG/DL — HIGH (ref 70–99)
GLUCOSE BLDC GLUCOMTR-MCNC: 132 MG/DL — HIGH (ref 70–99)
GLUCOSE BLDC GLUCOMTR-MCNC: 140 MG/DL — HIGH (ref 70–99)
GLUCOSE BLDC GLUCOMTR-MCNC: 145 MG/DL — HIGH (ref 70–99)
GLUCOSE BLDC GLUCOMTR-MCNC: 146 MG/DL — HIGH (ref 70–99)
GLUCOSE BLDC GLUCOMTR-MCNC: 146 MG/DL — HIGH (ref 70–99)
GLUCOSE BLDC GLUCOMTR-MCNC: 150 MG/DL — HIGH (ref 70–99)
GLUCOSE BLDC GLUCOMTR-MCNC: 152 MG/DL — HIGH (ref 70–99)
GLUCOSE BLDC GLUCOMTR-MCNC: 152 MG/DL — HIGH (ref 70–99)
GLUCOSE BLDC GLUCOMTR-MCNC: 156 MG/DL — HIGH (ref 70–99)
GLUCOSE BLDC GLUCOMTR-MCNC: 157 MG/DL — HIGH (ref 70–99)
GLUCOSE BLDC GLUCOMTR-MCNC: 158 MG/DL — HIGH (ref 70–99)
GLUCOSE BLDC GLUCOMTR-MCNC: 158 MG/DL — HIGH (ref 70–99)
GLUCOSE BLDC GLUCOMTR-MCNC: 165 MG/DL — HIGH (ref 70–99)
GLUCOSE BLDC GLUCOMTR-MCNC: 168 MG/DL — HIGH (ref 70–99)
GLUCOSE BLDC GLUCOMTR-MCNC: 173 MG/DL — HIGH (ref 70–99)
GLUCOSE BLDC GLUCOMTR-MCNC: 176 MG/DL — HIGH (ref 70–99)
GLUCOSE BLDC GLUCOMTR-MCNC: 178 MG/DL — HIGH (ref 70–99)
GLUCOSE BLDC GLUCOMTR-MCNC: 89 MG/DL — SIGNIFICANT CHANGE UP (ref 70–99)
GLUCOSE BLDC GLUCOMTR-MCNC: 95 MG/DL — SIGNIFICANT CHANGE UP (ref 70–99)
GLUCOSE SERPL-MCNC: 126 MG/DL — HIGH (ref 70–99)
HCT VFR BLD CALC: 35 % — LOW (ref 39–50)
HGB BLD-MCNC: 11.2 G/DL — LOW (ref 13–17)
MAGNESIUM SERPL-MCNC: 2 MG/DL — SIGNIFICANT CHANGE UP (ref 1.6–2.6)
MCHC RBC-ENTMCNC: 26.9 PG — LOW (ref 27–34)
MCHC RBC-ENTMCNC: 32 GM/DL — SIGNIFICANT CHANGE UP (ref 32–36)
MCV RBC AUTO: 83.9 FL — SIGNIFICANT CHANGE UP (ref 80–100)
NRBC # BLD: 0 /100 WBCS — SIGNIFICANT CHANGE UP (ref 0–0)
PHOSPHATE SERPL-MCNC: 2.7 MG/DL — SIGNIFICANT CHANGE UP (ref 2.5–4.5)
PLATELET # BLD AUTO: 102 K/UL — LOW (ref 150–400)
POTASSIUM SERPL-MCNC: 4.1 MMOL/L — SIGNIFICANT CHANGE UP (ref 3.5–5.3)
POTASSIUM SERPL-SCNC: 4.1 MMOL/L — SIGNIFICANT CHANGE UP (ref 3.5–5.3)
PROT SERPL-MCNC: 6.2 G/DL — SIGNIFICANT CHANGE UP (ref 6–8.3)
RBC # BLD: 4.17 M/UL — LOW (ref 4.2–5.8)
RBC # FLD: 14 % — SIGNIFICANT CHANGE UP (ref 10.3–14.5)
SODIUM SERPL-SCNC: 140 MMOL/L — SIGNIFICANT CHANGE UP (ref 135–145)
WBC # BLD: 15.71 K/UL — HIGH (ref 3.8–10.5)
WBC # FLD AUTO: 15.71 K/UL — HIGH (ref 3.8–10.5)

## 2022-05-05 PROCEDURE — 99291 CRITICAL CARE FIRST HOUR: CPT

## 2022-05-05 PROCEDURE — 99223 1ST HOSP IP/OBS HIGH 75: CPT

## 2022-05-05 PROCEDURE — 93010 ELECTROCARDIOGRAM REPORT: CPT

## 2022-05-05 PROCEDURE — 71045 X-RAY EXAM CHEST 1 VIEW: CPT | Mod: 26

## 2022-05-05 RX ORDER — METOPROLOL TARTRATE 50 MG
25 TABLET ORAL
Refills: 0 | Status: DISCONTINUED | OUTPATIENT
Start: 2022-05-05 | End: 2022-05-05

## 2022-05-05 RX ORDER — HYDRALAZINE HCL 50 MG
10 TABLET ORAL ONCE
Refills: 0 | Status: COMPLETED | OUTPATIENT
Start: 2022-05-05 | End: 2022-05-05

## 2022-05-05 RX ORDER — METOPROLOL TARTRATE 50 MG
25 TABLET ORAL EVERY 8 HOURS
Refills: 0 | Status: DISCONTINUED | OUTPATIENT
Start: 2022-05-05 | End: 2022-05-06

## 2022-05-05 RX ORDER — ATORVASTATIN CALCIUM 80 MG/1
80 TABLET, FILM COATED ORAL AT BEDTIME
Refills: 0 | Status: DISCONTINUED | OUTPATIENT
Start: 2022-05-05 | End: 2022-05-09

## 2022-05-05 RX ORDER — AMLODIPINE BESYLATE 2.5 MG/1
5 TABLET ORAL DAILY
Refills: 0 | Status: DISCONTINUED | OUTPATIENT
Start: 2022-05-05 | End: 2022-05-06

## 2022-05-05 RX ORDER — ENOXAPARIN SODIUM 100 MG/ML
40 INJECTION SUBCUTANEOUS EVERY 24 HOURS
Refills: 0 | Status: DISCONTINUED | OUTPATIENT
Start: 2022-05-05 | End: 2022-05-09

## 2022-05-05 RX ORDER — PANTOPRAZOLE SODIUM 20 MG/1
40 TABLET, DELAYED RELEASE ORAL
Refills: 0 | Status: DISCONTINUED | OUTPATIENT
Start: 2022-05-05 | End: 2022-05-09

## 2022-05-05 RX ORDER — INSULIN LISPRO 100/ML
VIAL (ML) SUBCUTANEOUS
Refills: 0 | Status: DISCONTINUED | OUTPATIENT
Start: 2022-05-05 | End: 2022-05-09

## 2022-05-05 RX ORDER — MAGNESIUM SULFATE 500 MG/ML
2 VIAL (ML) INJECTION ONCE
Refills: 0 | Status: COMPLETED | OUTPATIENT
Start: 2022-05-05 | End: 2022-05-05

## 2022-05-05 RX ADMIN — Medication 1 TABLET(S): at 11:46

## 2022-05-05 RX ADMIN — Medication 25 MILLIGRAM(S): at 13:03

## 2022-05-05 RX ADMIN — GABAPENTIN 100 MILLIGRAM(S): 400 CAPSULE ORAL at 21:25

## 2022-05-05 RX ADMIN — Medication 10 MILLIGRAM(S): at 11:40

## 2022-05-05 RX ADMIN — ATORVASTATIN CALCIUM 80 MILLIGRAM(S): 80 TABLET, FILM COATED ORAL at 21:25

## 2022-05-05 RX ADMIN — GABAPENTIN 100 MILLIGRAM(S): 400 CAPSULE ORAL at 05:32

## 2022-05-05 RX ADMIN — Medication 500 MILLIGRAM(S): at 05:30

## 2022-05-05 RX ADMIN — Medication 650 MILLIGRAM(S): at 06:12

## 2022-05-05 RX ADMIN — Medication 25 MILLIGRAM(S): at 21:25

## 2022-05-05 RX ADMIN — GABAPENTIN 100 MILLIGRAM(S): 400 CAPSULE ORAL at 13:03

## 2022-05-05 RX ADMIN — Medication 100 MILLIGRAM(S): at 09:28

## 2022-05-05 RX ADMIN — Medication 25 GRAM(S): at 04:25

## 2022-05-05 RX ADMIN — POLYETHYLENE GLYCOL 3350 17 GRAM(S): 17 POWDER, FOR SOLUTION ORAL at 11:47

## 2022-05-05 RX ADMIN — Medication 650 MILLIGRAM(S): at 11:46

## 2022-05-05 RX ADMIN — Medication 650 MILLIGRAM(S): at 17:07

## 2022-05-05 RX ADMIN — Medication 650 MILLIGRAM(S): at 17:33

## 2022-05-05 RX ADMIN — HYDROMORPHONE HYDROCHLORIDE 0.5 MILLIGRAM(S): 2 INJECTION INTRAMUSCULAR; INTRAVENOUS; SUBCUTANEOUS at 05:32

## 2022-05-05 RX ADMIN — CHLORHEXIDINE GLUCONATE 1 APPLICATION(S): 213 SOLUTION TOPICAL at 12:40

## 2022-05-05 RX ADMIN — Medication 100 MILLIGRAM(S): at 00:04

## 2022-05-05 RX ADMIN — OXYCODONE HYDROCHLORIDE 10 MILLIGRAM(S): 5 TABLET ORAL at 21:24

## 2022-05-05 RX ADMIN — PANTOPRAZOLE SODIUM 40 MILLIGRAM(S): 20 TABLET, DELAYED RELEASE ORAL at 06:59

## 2022-05-05 RX ADMIN — Medication 650 MILLIGRAM(S): at 12:39

## 2022-05-05 RX ADMIN — Medication 500 MILLIGRAM(S): at 17:07

## 2022-05-05 RX ADMIN — CHLORHEXIDINE GLUCONATE 5 MILLILITER(S): 213 SOLUTION TOPICAL at 17:08

## 2022-05-05 RX ADMIN — Medication 650 MILLIGRAM(S): at 05:29

## 2022-05-05 RX ADMIN — CHLORHEXIDINE GLUCONATE 5 MILLILITER(S): 213 SOLUTION TOPICAL at 02:19

## 2022-05-05 RX ADMIN — ENOXAPARIN SODIUM 40 MILLIGRAM(S): 100 INJECTION SUBCUTANEOUS at 10:49

## 2022-05-05 RX ADMIN — AMLODIPINE BESYLATE 5 MILLIGRAM(S): 2.5 TABLET ORAL at 14:45

## 2022-05-05 RX ADMIN — Medication 25 MILLIGRAM(S): at 08:18

## 2022-05-05 RX ADMIN — HYDROMORPHONE HYDROCHLORIDE 0.5 MILLIGRAM(S): 2 INJECTION INTRAMUSCULAR; INTRAVENOUS; SUBCUTANEOUS at 06:13

## 2022-05-05 RX ADMIN — OXYCODONE HYDROCHLORIDE 10 MILLIGRAM(S): 5 TABLET ORAL at 22:37

## 2022-05-05 RX ADMIN — Medication 81 MILLIGRAM(S): at 11:46

## 2022-05-05 RX ADMIN — Medication 100 MILLIGRAM(S): at 16:01

## 2022-05-05 NOTE — PROGRESS NOTE ADULT - SUBJECTIVE AND OBJECTIVE BOX
Patient seen and examined at the bedside.    Remained critically ill on continuous ICU monitoring.    OBJECTIVE:  Vital Signs Last 24 Hrs  T(C): 36.5 (05 May 2022 04:00), Max: 37.4 (04 May 2022 16:00)  T(F): 97.7 (05 May 2022 04:00), Max: 99.3 (04 May 2022 16:00)  HR: 85 (05 May 2022 06:00) (70 - 99)  BP: 134/78 (04 May 2022 06:45) (134/78 - 134/78)  BP(mean): 96 (04 May 2022 06:45) (96 - 96)  RR: 35 (05 May 2022 06:00) (9 - 35)  SpO2: 94% (05 May 2022 06:00) (94% - 100%)      Physical Exam:   General: NAD   Neurology: Nonfocal  Eyes: bilateral pupils equal and reactive   ENT/Neck: Neck supple, trachea midline, No JVD   Respiratory: Clear bilaterally   CV: S1S2, no murmurs        [x] Sternal dressing, [x] Mediastinal CT, [x] Pleural CT, [x] KIMI drain        [x] Sinus rhythm, [x] Temporary pacing - VVI @ 50  Abdominal: Soft, NT, ND +BS,   Extremities: 1-2+ pedal edema noted, + peripheral pulses   Skin: No Rashes, Hematoma, Ecchymosis                           Assessment:  66 M with no implantable cardiac devices and PMH of WAQAS -uses CPAP, htn, hld, AS, kidney stones, COVID infection Dec 2021 (not hospitalized) and pre DM     s/p AVR (t) on 5/4/2022  Hypertension  Hypovolemic Shock   Post op respiratory insufficiency   Leukocytosis  Thrombocytopenia  Stress Hyperglycemia    Plan:   ***Neuro***  [x] Nonfocal    Tylenol, Oxycodone, Dilaudid, and gabapentin for analgesia   Post operative neuro assessment     ***Cardiovascular***  Invasive hemodynamic monitoring, assess perfusion indices   SR / CVP 8 / MAP 74 / Hct 35.0% / Lactate 0.7  [x] Nicardipine 5mg/hr   Reassessment of hemodynamics   Monitor chest tube outputs   [x]  ASA  [x] Statin   Serial EKG and cardiac enzymes     ***Pulmonary***  Extubated overnight, now Receiving supplemental O2 therapy with BiPAP  Continue to monitor RR, breathing pattern, pulse ox, and ABG's.  Encourage incentive spirometry.     ***GI***  [x]  Diet:  consistent carb diet.   [x] Protonix    Bowel regimen with Miralax and Senna     ***Renal***  Continue to monitor I/Os, BUN/Creatinine.   Replete lytes PRN  Oliveira present [x] positive     ***ID***  Continue Cefuroxime for perioperative antibiotic coverage,     ***Endocrine***  [x] Stress Hyperglycemia [x] Prediabetes : HbA1c 5.8%               - [x] Insulin gtt               - Need tight glycemic control to prevent wound infection.      Patient requires continuous monitoring with bedside rhythm monitoring, pulse oximetry monitoring, and continuous central venous and arterial pressure monitoring; and intermittent blood gas analysis. Care plan discussed with the ICU care team.   Patient remained critical, at risk for life threatening decompensation.    I have spent 30 minutes providing critical care management to this patient.    By signing my name below, I, Almaz Perez, attest that this documentation has been prepared under the direction and in the presence of Jones Mcdermott MD   Electronically signed: Reji Shea, 05-05-22 @ 06:14    I, Almaz Perez, personally performed the services described in this documentation. all medical record entries made by the scribe were at my direction and in my presence. I have reviewed the chart and agree that the record reflects my personal performance and is accurate and complete  Electronically signed: Jones Mcdermott MD  Patient seen and examined at the bedside.    Remained critically ill on continuous ICU monitoring.    OBJECTIVE:  Vital Signs Last 24 Hrs  T(C): 36.5 (05 May 2022 04:00), Max: 37.4 (04 May 2022 16:00)  T(F): 97.7 (05 May 2022 04:00), Max: 99.3 (04 May 2022 16:00)  HR: 85 (05 May 2022 06:00) (70 - 99)  BP: 134/78 (04 May 2022 06:45) (134/78 - 134/78)  BP(mean): 96 (04 May 2022 06:45) (96 - 96)  RR: 35 (05 May 2022 06:00) (9 - 35)  SpO2: 94% (05 May 2022 06:00) (94% - 100%)      Physical Exam:   General: NAD   Neurology: Nonfocal  Eyes: bilateral pupils equal and reactive   ENT/Neck: Neck supple, trachea midline, No JVD   Respiratory: Clear bilaterally   CV: S1S2, no murmurs        [x] Sternal dressing, [x] Mediastinal CT, [x] Pleural CT, [x] KIMI drain        [x] Sinus rhythm, [x] Temporary pacing - VVI @ 50  Abdominal: Soft, NT, ND +BS,   Extremities: 1-2+ pedal edema noted, + peripheral pulses   Skin: No Rashes, Hematoma, Ecchymosis                           Assessment:  66 M with no implantable cardiac devices and PMH of WAQAS -uses CPAP, htn, hld, AS, kidney stones, COVID infection Dec 2021 (not hospitalized) and pre DM     s/p AVR (t) on 5/4/2022  Hypertension  Hypovolemic Shock   Post op respiratory insufficiency   Leukocytosis  Thrombocytopenia  Stress Hyperglycemia    Plan:   ***Neuro***  [x] Nonfocal    Tylenol, Oxycodone, Dilaudid, and gabapentin for analgesia   Post operative neuro assessment     ***Cardiovascular***  Invasive hemodynamic monitoring, assess perfusion indices   SR / CVP 8 / MAP 74 / Hct 35.0% / Lactate 0.7  [x] Nicardipine - weaned off   Reassessment of hemodynamics   Monitor chest tube outputs   Start on lovenox for VTE prophylaxis   [x]  ASA  [x] Statin   Serial EKG and cardiac enzymes     ***Pulmonary***  Extubated overnight, Receiving supplemental O2 therapy with 2LNC  Continue to monitor RR, breathing pattern, pulse ox, and ABG's.  Encourage incentive spirometry.     ***GI***  [x]  Diet:  consistent carb diet.   [x] Protonix    Bowel regimen with Miralax and Senna     ***Renal***  Continue to monitor I/Os, BUN/Creatinine.   Replete lytes PRN  Oliveira present [x] positive     ***ID***  Continue Cefuroxime for perioperative antibiotic coverage,     ***Endocrine***  [x] Stress Hyperglycemia [x] Prediabetes : HbA1c 5.8%               - [x] Insulin gtt  - transition to ISS              - Need tight glycemic control to prevent wound infection.      Patient requires continuous monitoring with bedside rhythm monitoring, pulse oximetry monitoring, and continuous central venous and arterial pressure monitoring; and intermittent blood gas analysis. Care plan discussed with the ICU care team.   Patient remained critical, at risk for life threatening decompensation.    I have spent 30 minutes providing critical care management to this patient.    By signing my name below, I, Almaz Perez, attest that this documentation has been prepared under the direction and in the presence of Jones Mcdermott MD   Electronically signed: Reji Shea, 05-05-22 @ 06:14    I, Almaz Perez, personally performed the services described in this documentation. all medical record entries made by the curtisibzena were at my direction and in my presence. I have reviewed the chart and agree that the record reflects my personal performance and is accurate and complete  Electronically signed: Jones Mcdermott MD

## 2022-05-05 NOTE — CONSULT NOTE ADULT - SUBJECTIVE AND OBJECTIVE BOX
[facility] - Na Amin, Hakan, Olga Lidia, Bridget, Radha Snow  Office Number: [phone]    Initial Consult Note    CHIEF COMPLAINT: Patient is a 66y old  Male who presents with a chief complaint of AVR (t) (05 May 2022 06:13)      HPI:  66 year old male with no implantable cardiac devices and PMH of WAQAS -uses CPAP, htn, hld, AS, kidney stones, COVID infection Dec 2021 (not hospitalized) and pre DM presents today for [facility]. Patient reports progressive WHALEY since 2018, at which time he was diagnosed with AS. Over the past 6 months approx he feels increasing WHALEY- even when walking the dog. Denies CP, dizziness or syncope. Repeat ECHO was done 3/14/2022 revealing min MR, severe AS , mild AR, EF 75%.  Patient was referred to Dr Micah Cameron for AVR scheduled for 5/4/2022. For [facility] today to evaluate coronaries prior to OHS.  (03 May 2022 08:14)    He is now s/p bio avr on 5/4 for severe as/ai  ef 51%  cath with non obs cad  doing well this morning, sitting in chair and eating breakfast    PAST MEDICAL & SURGICAL HISTORY:  Hypertension    Testosterone deficiency    H/O hyperlipidemia    Renal colic    Unilateral inguinal hernia with obstruction and without gangrene, recurrence not specified    Heart murmur    Obstructive sleep apnea    H/O aortic valve stenosis    Status post medial meniscus repair  2015    Collapsed lung    H/O lithotripsy        SOCIAL HISTORY:  No tobacco, ethanol, or drug abuse.    FAMILY HISTORY:  Family history of emphysema (Mother)    Family history of heart attack (Mother)    Family history of type 2 diabetes mellitus in brother (Sibling)      No family history of acute MI or sudden cardiac death.    MEDICATIONS  (STANDING):  acetaminophen     Tablet .. 650 milliGRAM(s) Oral every 6 hours  ascorbic acid 500 milliGRAM(s) Oral two times a day  aspirin enteric coated 81 milliGRAM(s) Oral daily  atorvastatin 80 milliGRAM(s) Oral at bedtime  cefuroxime  IVPB 1500 milliGRAM(s) IV Intermittent every 8 hours  chlorhexidine 0.12% Liquid 5 milliLiter(s) Oral Mucosa two times a day  chlorhexidine 2% Cloths 1 Application(s) Topical daily  enoxaparin Injectable 40 milliGRAM(s) SubCutaneous every 24 hours  gabapentin 100 milliGRAM(s) Oral every 8 hours  insulin lispro (ADMELOG) corrective regimen sliding scale   SubCutaneous Before meals and at bedtime  insulin regular Infusion 3 Unit(s)/Hr (3 mL/Hr) IV Continuous <Continuous>  metoprolol tartrate 25 milliGRAM(s) Oral two times a day  multivitamin 1 Tablet(s) Oral daily  niCARdipine Infusion 5 mG/Hr (25 mL/Hr) IV Continuous <Continuous>  pantoprazole    Tablet 40 milliGRAM(s) Oral before breakfast  polyethylene glycol 3350 17 Gram(s) Oral daily  senna 2 Tablet(s) Oral at bedtime  sodium chloride 0.9%. 1000 milliLiter(s) (10 mL/Hr) IV Continuous <Continuous>    MEDICATIONS  (PRN):  HYDROmorphone  Injectable 0.5 milliGRAM(s) IV Push every 6 hours PRN Severe Pain (7 - 10)  oxyCODONE    IR 5 milliGRAM(s) Oral every 4 hours PRN Moderate Pain (4 - 6)  oxyCODONE    IR 10 milliGRAM(s) Oral every 4 hours PRN Severe Pain (7 - 10)      Allergies    penicillin (Urticaria; Rash)  pollen itchy eye (Eye Irritation)  sulfa drugs (Urticaria; Rash)    Intolerances        REVIEW OF SYSTEMS:    CONSTITUTIONAL: No weakness, fevers or chills  EYES/ENT: No visual changes;  No vertigo or throat pain   NECK: No pain or stiffness  RESPIRATORY: No cough, wheezing, hemoptysis; No shortness of breath  CARDIOVASCULAR: No chest pain or palpitations  GASTROINTESTINAL: No abdominal pain. No nausea, vomiting, or hematemesis; No diarrhea or constipation. No melena or hematochezia.  GENITOURINARY: No dysuria, frequency or hematuria  NEUROLOGICAL: No numbness or weakness  SKIN: No itching or rash  All other review of systems is negative unless indicated above    VITAL SIGNS:   Vital Signs Last 24 Hrs  T(C): 36.6 (05 May 2022 08:00), Max: 37.4 (04 May 2022 16:00)  T(F): 97.9 (05 May 2022 08:00), Max: 99.3 (04 May 2022 16:00)  HR: 86 (05 May 2022 09:06) (70 - 99)  BP: --  BP(mean): --  RR: 24 (05 May 2022 09:00) (9 - 35)  SpO2: 96% (05 May 2022 09:06) (94% - 100%)    I&O's Summary    04 May 2022 07:01  -  05 May 2022 07:00  --------------------------------------------------------  IN: 3499.8 mL / OUT: 2045 mL / NET: 1454.8 mL    05 May 2022 07:01  -  05 May 2022 09:52  --------------------------------------------------------  IN: 80 mL / OUT: 150 mL / NET: -70 mL        On Exam:    Constitutional: NAD, alert and oriented x 3  Lungs:  Non-labored, breath sounds are decreased bilaterally, No wheezing, rales or rhonchi  Cardiovascular: RRR.  S1 and S2 positive.  No murmurs, rubs, gallops or clicks  Gastrointestinal: Bowel Sounds present, soft, nontender.   Lymph: No peripheral edema. No cervical lymphadenopathy.  Neurological: Alert, no focal deficits  Skin: No rashes or ulcers   Psych:  Mood & affect appropriate.    LABS: All Labs Reviewed:                        11.2   15.71 )-----------( 102      ( 05 May 2022 00:12 )             35.0                         13.5   22.61 )-----------( 127      ( 04 May 2022 13:28 )             41.0                         14.1   7.80  )-----------( 149      ( 03 May 2022 08:48 )             44.1     05 May 2022 00:12    140    |  105    |  20     ----------------------------<  126    4.1     |  23     |  0.89   04 May 2022 13:28    141    |  106    |  20     ----------------------------<  170    4.3     |  21     |  0.83   03 May 2022 08:48    142    |  107    |  22     ----------------------------<  111    4.7     |  24     |  0.93     Ca    8.7        05 May 2022 00:12  Ca    9.0        04 May 2022 13:28  Ca    9.1        03 May 2022 08:48  Phos  2.7       05 May 2022 00:12  Phos  3.1       04 May 2022 13:28  Mg     2.0       05 May 2022 00:12  Mg     2.8       04 May 2022 13:28    TPro  6.2    /  Alb  4.0    /  TBili  0.5    /  DBili  x      /  AST  35     /  ALT  12     /  AlkPhos  35     05 May 2022 00:12  TPro  5.8    /  Alb  3.5    /  TBili  0.9    /  DBili  x      /  AST  36     /  ALT  13     /  AlkPhos  46     04 May 2022 13:28  TPro  7.5    /  Alb  4.0    /  TBili  0.4    /  DBili  x      /  AST  17     /  ALT  16     /  AlkPhos  57     03 May 2022 08:48    PT/INR - ( 04 May 2022 13:28 )   PT: 13.6 sec;   INR: 1.18 ratio         PTT - ( 04 May 2022 13:28 )  PTT:27.1 sec  CARDIAC MARKERS ( 04 May 2022 13:28 )  x     / x     / 304 U/L / x     / 43.1 ng/mL      Blood Culture:   05-03 @ 08:48  Pro Bnp 1023    05-03 @ 13:09  TSH: 1.92  05-03 @ 08:48  TSH: 1.94      RADIOLOGY:    tele: SR

## 2022-05-05 NOTE — CONSULT NOTE ADULT - ASSESSMENT
Rosalio is a 66 year old male with WAQAS, HTN, HLD with WHALEY and found to have severe as/ai, and is now s/p bio avr on 5/4.    - doing well post-operatively, off pressors and inotropes  - hemodynamically stable this am  - cont asa and statin  - cont bb  - no sign of acute ischemia or volume overload  - remains in a sr on telemetry  - cath with mild non obs cad and ef 51% intraoperatively  - oxygen supplementation as needed  - trend creatinine and electrolytes. keep K>4, mg>2  - will follow with you postoperatively

## 2022-05-05 NOTE — PHYSICAL THERAPY INITIAL EVALUATION ADULT - LIVES WITH, PROFILE
Pt resides with spouse in private home with 3 steps to enter and HR assist, +first floor setup. Pt reports being functionally independent in all aspects of mobility and self care prior without DME/AD. Pt states he walks the dog about a mile every other day, (+) drives./spouse

## 2022-05-05 NOTE — PHYSICAL THERAPY INITIAL EVALUATION ADULT - PLANNED THERAPY INTERVENTIONS, PT EVAL
STAIR GOAL: Pt will negotiate 1 FOS independently with HR assist as needed within 3-4 wks./balance training/bed mobility training/gait training/transfer training

## 2022-05-05 NOTE — PROGRESS NOTE ADULT - ASSESSMENT
Patient is a 66M with PMH of WAQAS on nocturnal CPAP, HTN, HLD, AS, kidney stones, COVID infection Dec 2021 (not hospitalized) and pre DM.  Patient had progressive WHALEY since 2018, at which time he was diagnosed with AS and for approximately the last 6 months has had increasing WHALEY. Repeat ECHO was done 3/14/2022 revealing min MR, severe AS , mild AR, EF 75%.  Patient was referred to Dr Micah Cameron for AVR scheduled.  Patient is now s/p AVR on 5/4/2022 with Dr. Cameron.  No intra-op blood products, now tx from CTU to SDU.      5/4/2022- AVR-- Size 23mm Inspiris bioprosthetic aortic valve replacement, EF 50%, mild MR, no PVL.  No intra-op blood products given. V-wires.  Mediastinal chest tubesx2.  Extubated.     5/5/2022- On ASA 81mg, Lovenox 40mg QD. Lopressor 25mg Q8 started.  Norvasc 5mg started for hypertension. Received hydralazine  Patient is a 66M with PMH of WAQAS on nocturnal CPAP, HTN, HLD, AS, kidney stones, COVID infection Dec 2021 (not hospitalized) and pre DM.  Patient had progressive WHALEY since 2018, at which time he was diagnosed with AS and for approximately the last 6 months has had increasing WHALEY. Repeat ECHO was done 3/14/2022 revealing min MR, severe AS , mild AR, EF 75%.  Patient was referred to Dr Micah Cameron for AVR scheduled.  Patient is now s/p AVR on 5/4/2022 with Dr. Cameron.  No intra-op blood products, now tx from CTU to SDU.      5/4/2022- AVR-- Size 23mm Inspiris bioprosthetic aortic valve replacement, EF 50%, mild MR, no PVL.  No intra-op blood products given. V-wires.  Mediastinal chest tubesx2.  Extubated.     5/5/2022- On ASA 81mg, Lovenox 40mg QD. Lopressor 25mg Q8 started.  Norvasc 5mg started for hypertension. TX to 2cohen SDU.  RIJ central line/R-radial yuliet in place-will maintain overnight re-assess in AM to de-intensify.  +Oliveira draining clear yellow urine, maintain overnight.  V-Wires to box VVI 50/10 monitor pacing requirement

## 2022-05-05 NOTE — PHYSICAL THERAPY INITIAL EVALUATION ADULT - GENERAL OBSERVATIONS, REHAB EVAL
Pt received sitting up in bedside chair in NAD, +CTU monitoring, +RIJ, +IVF, +A-line, +delgado, +chest tube x1, +2L supplemental O2 via NC, +ex pacemaker, +sequentials in place, VSS, agreeable to participate in therapy at this time

## 2022-05-05 NOTE — PROGRESS NOTE ADULT - PROBLEM SELECTOR PLAN 1
s/p AVR on 5/4/2022 for AS with Dr. Cameron     Tx to SDU 5/5/2022    -Continue ASA 81mg, Lovenox s/p AVR on 5/4/2022 for AS with Dr. Cameron     Tx to SDU 5/5/2022    -Continue ASA 81mg, Lovenox 40mg QD  -Continue Lopressor 25mg Q8 and up-titrate as tolerated.    -Continue Norvasc 5mg QD  -Continue statin  -Continue mediastinal drains to suction, monitor output   -Maintain right radial yuliet/rij central line overnight  -Glycemic control with insulin gtt per protocol  -Oliveira overnight to monitor urine output.    -Maintain V-Wires to pacing box VVI 50/10  - Chest Xray in AM  -Continue bowel regimen--pt due for BM     -Patient encouraged to ambulate as tolerated, continue cough/deep breathing exercises along with incentive spirometry

## 2022-05-05 NOTE — PHYSICAL THERAPY INITIAL EVALUATION ADULT - PERTINENT HX OF CURRENT PROBLEM, REHAB EVAL
66 year old male with no implantable cardiac devices and PMHx of WAQAS -uses CPAP, htn, hld, AS, kidney stones, COVID infection Dec 2021 (not hospitalized) and pre DM presents for [facility]. Patient reports progressive WHALEY since 2018, at which time he was diagnosed with AS. Over the past 6 months approx he feels increasing WHALEY- even when walking the dog. Denies CP, dizziness or syncope. CONTINUED:

## 2022-05-05 NOTE — PROGRESS NOTE ADULT - SUBJECTIVE AND OBJECTIVE BOX
Subjective: " "    TELEMETRY:    VITAL SIGNS    Vital Signs Last 24 Hrs  T(C): 37.7 (05-05-22 @ 16:00), Max: 37.7 (05-05-22 @ 16:00)  T(F): 99.9 (05-05-22 @ 16:00), Max: 99.9 (05-05-22 @ 16:00)  HR: 84 (05-05-22 @ 18:00) (70 - 94)  BP: 136/63 (05-05-22 @ 17:00) (124/58 - 136/63)  RR: 13 (05-05-22 @ 18:00) (5 - 35)  SpO2: 93% (05-05-22 @ 18:00) (92% - 98%)            05-04 @ 07:01  -  05-05 @ 07:00  --------------------------------------------------------  IN: 3499.8 mL / OUT: 2045 mL / NET: 1454.8 mL    05-05 @ 07:01  -  05-05 @ 19:03  --------------------------------------------------------  IN: 477 mL / OUT: 910 mL / NET: -433 mL       Daily     Daily   Admit Wt: Drug Dosing Weight  Height (cm): 167.6 (04 May 2022 06:45)  Weight (kg): 95.3 (04 May 2022 06:45)  BMI (kg/m2): 33.9 (04 May 2022 06:45)  BSA (m2): 2.04 (04 May 2022 06:45)    Bilirubin Total, Serum: 0.5 mg/dL (05-05 @ 00:12)    CAPILLARY BLOOD GLUCOSE  146 (05 May 2022 07:00)  150 (05 May 2022 06:00)  158 (05 May 2022 05:00)  140 (05 May 2022 04:00)  132 (05 May 2022 03:00)  89 (05 May 2022 02:00)  104 (05 May 2022 01:00)  131 (05 May 2022 00:00)  129 (04 May 2022 23:00)  144 (04 May 2022 22:00)  159 (04 May 2022 21:00)  172 (04 May 2022 20:00)      POCT Blood Glucose.: 165 mg/dL (05 May 2022 18:45)  POCT Blood Glucose.: 157 mg/dL (05 May 2022 17:59)  POCT Blood Glucose.: 168 mg/dL (05 May 2022 17:10)  POCT Blood Glucose.: 146 mg/dL (05 May 2022 16:04)  POCT Blood Glucose.: 173 mg/dL (05 May 2022 14:49)  POCT Blood Glucose.: 152 mg/dL (05 May 2022 14:11)  POCT Blood Glucose.: 158 mg/dL (05 May 2022 12:52)  POCT Blood Glucose.: 152 mg/dL (05 May 2022 12:18)  POCT Blood Glucose.: 178 mg/dL (05 May 2022 11:19)  POCT Blood Glucose.: 176 mg/dL (05 May 2022 08:10)  POCT Blood Glucose.: 146 mg/dL (05 May 2022 06:51)  POCT Blood Glucose.: 150 mg/dL (05 May 2022 06:00)  POCT Blood Glucose.: 158 mg/dL (05 May 2022 04:55)  POCT Blood Glucose.: 140 mg/dL (05 May 2022 03:46)  POCT Blood Glucose.: 132 mg/dL (05 May 2022 02:54)  POCT Blood Glucose.: 89 mg/dL (05 May 2022 02:12)  POCT Blood Glucose.: 104 mg/dL (05 May 2022 01:01)  POCT Blood Glucose.: 129 mg/dL (04 May 2022 23:04)  POCT Blood Glucose.: 144 mg/dL (04 May 2022 22:03)  POCT Blood Glucose.: 159 mg/dL (04 May 2022 20:56)  POCT Blood Glucose.: 172 mg/dL (04 May 2022 19:58)  POCT Blood Glucose.: 173 mg/dL (04 May 2022 19:13)              PHYSICAL EXAM    Neurology: alert and oriented x 3, nonfocal, no gross deficits  CV : tele:  RSR  Sternal Wound :  CDI with dressing , Stable  Lungs: clear. RR easy, unlabored   Abdomen: soft, nontender, nondistended, positive bowel sounds, bowel movement   Neg N/V/D   :  pt voiding without difficulty   Extremities:   MCKEON; edema, neg calf tenderness.   PPP bilaterally      PW:    Chest tubes: + Medsx2 to suction draining smal        acetaminophen     Tablet .. 650 milliGRAM(s) Oral every 6 hours  amLODIPine   Tablet 5 milliGRAM(s) Oral daily  ascorbic acid 500 milliGRAM(s) Oral two times a day  aspirin enteric coated 81 milliGRAM(s) Oral daily  atorvastatin 80 milliGRAM(s) Oral at bedtime  cefuroxime  IVPB 1500 milliGRAM(s) IV Intermittent every 8 hours  chlorhexidine 0.12% Liquid 5 milliLiter(s) Oral Mucosa two times a day  chlorhexidine 2% Cloths 1 Application(s) Topical daily  enoxaparin Injectable 40 milliGRAM(s) SubCutaneous every 24 hours  gabapentin 100 milliGRAM(s) Oral every 8 hours  HYDROmorphone  Injectable 0.5 milliGRAM(s) IV Push every 6 hours PRN  insulin lispro (ADMELOG) corrective regimen sliding scale   SubCutaneous Before meals and at bedtime  insulin regular Infusion 3 Unit(s)/Hr IV Continuous <Continuous>  metoprolol tartrate 25 milliGRAM(s) Oral every 8 hours  multivitamin 1 Tablet(s) Oral daily  oxyCODONE    IR 5 milliGRAM(s) Oral every 4 hours PRN  oxyCODONE    IR 10 milliGRAM(s) Oral every 4 hours PRN  pantoprazole    Tablet 40 milliGRAM(s) Oral before breakfast  polyethylene glycol 3350 17 Gram(s) Oral daily  senna 2 Tablet(s) Oral at bedtime  sodium chloride 0.9%. 1000 milliLiter(s) IV Continuous <Continuous>                         Subjective: "I'm doing ok "  Patient denies headache, dizziness.  No chest pain or shortness of breath.  No abdominal pain/nausea.  States he has not had a bowel movement since surgery     TELEMETRY: NSR 80s    VITAL SIGNS    Vital Signs Last 24 Hrs  T(C): 37.7 (05-05-22 @ 16:00), Max: 37.7 (05-05-22 @ 16:00)  T(F): 99.9 (05-05-22 @ 16:00), Max: 99.9 (05-05-22 @ 16:00)  HR: 84 (05-05-22 @ 18:00) (70 - 94)  BP: 136/63 (05-05-22 @ 17:00) (124/58 - 136/63)  RR: 13 (05-05-22 @ 18:00) (5 - 35)  SpO2: 93% (05-05-22 @ 18:00) (92% - 98%)            05-04 @ 07:01  -  05-05 @ 07:00  --------------------------------------------------------  IN: 3499.8 mL / OUT: 2045 mL / NET: 1454.8 mL    05-05 @ 07:01  -  05-05 @ 19:03  --------------------------------------------------------  IN: 477 mL / OUT: 910 mL / NET: -433 mL       Daily     Daily   Admit Wt: Drug Dosing Weight  Height (cm): 167.6 (04 May 2022 06:45)  Weight (kg): 95.3 (04 May 2022 06:45)  BMI (kg/m2): 33.9 (04 May 2022 06:45)  BSA (m2): 2.04 (04 May 2022 06:45)    Bilirubin Total, Serum: 0.5 mg/dL (05-05 @ 00:12)    CAPILLARY BLOOD GLUCOSE  146 (05 May 2022 07:00)  150 (05 May 2022 06:00)  158 (05 May 2022 05:00)  140 (05 May 2022 04:00)  132 (05 May 2022 03:00)  89 (05 May 2022 02:00)  104 (05 May 2022 01:00)  131 (05 May 2022 00:00)  129 (04 May 2022 23:00)  144 (04 May 2022 22:00)  159 (04 May 2022 21:00)  172 (04 May 2022 20:00)      POCT Blood Glucose.: 165 mg/dL (05 May 2022 18:45)  POCT Blood Glucose.: 157 mg/dL (05 May 2022 17:59)  POCT Blood Glucose.: 168 mg/dL (05 May 2022 17:10)  POCT Blood Glucose.: 146 mg/dL (05 May 2022 16:04)  POCT Blood Glucose.: 173 mg/dL (05 May 2022 14:49)  POCT Blood Glucose.: 152 mg/dL (05 May 2022 14:11)  POCT Blood Glucose.: 158 mg/dL (05 May 2022 12:52)  POCT Blood Glucose.: 152 mg/dL (05 May 2022 12:18)  POCT Blood Glucose.: 178 mg/dL (05 May 2022 11:19)  POCT Blood Glucose.: 176 mg/dL (05 May 2022 08:10)  POCT Blood Glucose.: 146 mg/dL (05 May 2022 06:51)  POCT Blood Glucose.: 150 mg/dL (05 May 2022 06:00)  POCT Blood Glucose.: 158 mg/dL (05 May 2022 04:55)  POCT Blood Glucose.: 140 mg/dL (05 May 2022 03:46)  POCT Blood Glucose.: 132 mg/dL (05 May 2022 02:54)  POCT Blood Glucose.: 89 mg/dL (05 May 2022 02:12)  POCT Blood Glucose.: 104 mg/dL (05 May 2022 01:01)  POCT Blood Glucose.: 129 mg/dL (04 May 2022 23:04)  POCT Blood Glucose.: 144 mg/dL (04 May 2022 22:03)  POCT Blood Glucose.: 159 mg/dL (04 May 2022 20:56)  POCT Blood Glucose.: 172 mg/dL (04 May 2022 19:58)  POCT Blood Glucose.: 173 mg/dL (04 May 2022 19:13)          PHYSICAL EXAM    Neurology: alert and oriented x 4, nonfocal, no gross deficits  CV : RRR +S1/S2   Sternal Wound :  CDI with dressing , Stable  Lungs: CTA BL. RR easy, unlabored, no cough/wheeze/rhonchi   Abdomen: soft, nontender, nondistended, positive bowel sounds, No bowel movement since surgery   :  +delgado draining clear yellow urine   Extremities:  MCKEON; +1 LE edema, neg calf tenderness.   PPP bilaterally    Lines-Right radial yuliet in place.  +1palpable radial pulse.  Hand feels warm/well perfused.  Right IJ central line in place.  Insertion site is without erythema or drainage.  Dressing c/d/i.   Skin- Facial/chest blanchable redness-flushing     PW: +V wires to EPM set to VVI 50/10/0.8    Chest tubes: + Medsx2 to suction draining smal      acetaminophen     Tablet .. 650 milliGRAM(s) Oral every 6 hours  amLODIPine   Tablet 5 milliGRAM(s) Oral daily  ascorbic acid 500 milliGRAM(s) Oral two times a day  aspirin enteric coated 81 milliGRAM(s) Oral daily  atorvastatin 80 milliGRAM(s) Oral at bedtime  cefuroxime  IVPB 1500 milliGRAM(s) IV Intermittent every 8 hours  chlorhexidine 0.12% Liquid 5 milliLiter(s) Oral Mucosa two times a day  chlorhexidine 2% Cloths 1 Application(s) Topical daily  enoxaparin Injectable 40 milliGRAM(s) SubCutaneous every 24 hours  gabapentin 100 milliGRAM(s) Oral every 8 hours  HYDROmorphone  Injectable 0.5 milliGRAM(s) IV Push every 6 hours PRN  insulin lispro (ADMELOG) corrective regimen sliding scale   SubCutaneous Before meals and at bedtime  insulin regular Infusion 3 Unit(s)/Hr IV Continuous <Continuous>  metoprolol tartrate 25 milliGRAM(s) Oral every 8 hours  multivitamin 1 Tablet(s) Oral daily  oxyCODONE    IR 5 milliGRAM(s) Oral every 4 hours PRN  oxyCODONE    IR 10 milliGRAM(s) Oral every 4 hours PRN  pantoprazole    Tablet 40 milliGRAM(s) Oral before breakfast  polyethylene glycol 3350 17 Gram(s) Oral daily  senna 2 Tablet(s) Oral at bedtime  sodium chloride 0.9%. 1000 milliLiter(s) IV Continuous <Continuous>

## 2022-05-05 NOTE — PHYSICAL THERAPY INITIAL EVALUATION ADULT - DIAGNOSIS, PT EVAL
Pt presents with mild postop pain, impairments in strength, balance and endurance impacting ability to perform ADLs and functional mobiltiy

## 2022-05-05 NOTE — PHYSICAL THERAPY INITIAL EVALUATION ADULT - ADDITIONAL COMMENTS
Chest X-Ray 5/3/22 IMPRESSION: No active pulmonary disease.  CArdiac Cath 5/3/22: Mild nonobstructive coronary artery disease.

## 2022-05-05 NOTE — PHYSICAL THERAPY INITIAL EVALUATION ADULT - PRECAUTIONS/LIMITATIONS, REHAB EVAL
Repeat ECHO was done 3/14/2022 revealing min MR, severe AS , mild AR, EF 75%.  Patient was referred to Dr Micah Cameron now presents POD#1 s/p AVR. For [facility] to evaluate coronaries prior to OHS./cardiac precautions/fall precautions

## 2022-05-06 LAB
ALBUMIN SERPL ELPH-MCNC: 3.9 G/DL — SIGNIFICANT CHANGE UP (ref 3.3–5)
ALP SERPL-CCNC: 40 U/L — SIGNIFICANT CHANGE UP (ref 40–120)
ALT FLD-CCNC: 13 U/L — SIGNIFICANT CHANGE UP (ref 10–45)
ANION GAP SERPL CALC-SCNC: 9 MMOL/L — SIGNIFICANT CHANGE UP (ref 5–17)
AST SERPL-CCNC: 25 U/L — SIGNIFICANT CHANGE UP (ref 10–40)
BASOPHILS # BLD AUTO: 0 K/UL — SIGNIFICANT CHANGE UP (ref 0–0.2)
BASOPHILS NFR BLD AUTO: 0 % — SIGNIFICANT CHANGE UP (ref 0–2)
BILIRUB SERPL-MCNC: 0.4 MG/DL — SIGNIFICANT CHANGE UP (ref 0.2–1.2)
BUN SERPL-MCNC: 30 MG/DL — HIGH (ref 7–23)
CALCIUM SERPL-MCNC: 9.1 MG/DL — SIGNIFICANT CHANGE UP (ref 8.4–10.5)
CHLORIDE SERPL-SCNC: 106 MMOL/L — SIGNIFICANT CHANGE UP (ref 96–108)
CO2 SERPL-SCNC: 24 MMOL/L — SIGNIFICANT CHANGE UP (ref 22–31)
CREAT SERPL-MCNC: 0.97 MG/DL — SIGNIFICANT CHANGE UP (ref 0.5–1.3)
EGFR: 86 ML/MIN/1.73M2 — SIGNIFICANT CHANGE UP
EOSINOPHIL # BLD AUTO: 0 K/UL — SIGNIFICANT CHANGE UP (ref 0–0.5)
EOSINOPHIL NFR BLD AUTO: 0 % — SIGNIFICANT CHANGE UP (ref 0–6)
GLUCOSE BLDC GLUCOMTR-MCNC: 100 MG/DL — HIGH (ref 70–99)
GLUCOSE BLDC GLUCOMTR-MCNC: 110 MG/DL — HIGH (ref 70–99)
GLUCOSE BLDC GLUCOMTR-MCNC: 110 MG/DL — HIGH (ref 70–99)
GLUCOSE BLDC GLUCOMTR-MCNC: 120 MG/DL — HIGH (ref 70–99)
GLUCOSE BLDC GLUCOMTR-MCNC: 123 MG/DL — HIGH (ref 70–99)
GLUCOSE BLDC GLUCOMTR-MCNC: 127 MG/DL — HIGH (ref 70–99)
GLUCOSE BLDC GLUCOMTR-MCNC: 128 MG/DL — HIGH (ref 70–99)
GLUCOSE BLDC GLUCOMTR-MCNC: 139 MG/DL — HIGH (ref 70–99)
GLUCOSE BLDC GLUCOMTR-MCNC: 149 MG/DL — HIGH (ref 70–99)
GLUCOSE SERPL-MCNC: 132 MG/DL — HIGH (ref 70–99)
HCT VFR BLD CALC: 35.3 % — LOW (ref 39–50)
HGB BLD-MCNC: 11.4 G/DL — LOW (ref 13–17)
LYMPHOCYTES # BLD AUTO: 1.49 K/UL — SIGNIFICANT CHANGE UP (ref 1–3.3)
LYMPHOCYTES # BLD AUTO: 5.1 % — LOW (ref 13–44)
MAGNESIUM SERPL-MCNC: 2.4 MG/DL — SIGNIFICANT CHANGE UP (ref 1.6–2.6)
MANUAL SMEAR VERIFICATION: SIGNIFICANT CHANGE UP
MCHC RBC-ENTMCNC: 27.2 PG — SIGNIFICANT CHANGE UP (ref 27–34)
MCHC RBC-ENTMCNC: 32.3 GM/DL — SIGNIFICANT CHANGE UP (ref 32–36)
MCV RBC AUTO: 84.2 FL — SIGNIFICANT CHANGE UP (ref 80–100)
MONOCYTES # BLD AUTO: 0.76 K/UL — SIGNIFICANT CHANGE UP (ref 0–0.9)
MONOCYTES NFR BLD AUTO: 2.6 % — SIGNIFICANT CHANGE UP (ref 2–14)
NEUTROPHILS # BLD AUTO: 26.9 K/UL — HIGH (ref 1.8–7.4)
NEUTROPHILS NFR BLD AUTO: 92.3 % — HIGH (ref 43–77)
PHOSPHATE SERPL-MCNC: 3.1 MG/DL — SIGNIFICANT CHANGE UP (ref 2.5–4.5)
PLAT MORPH BLD: NORMAL — SIGNIFICANT CHANGE UP
PLATELET # BLD AUTO: 124 K/UL — LOW (ref 150–400)
POTASSIUM SERPL-MCNC: 4.9 MMOL/L — SIGNIFICANT CHANGE UP (ref 3.5–5.3)
POTASSIUM SERPL-SCNC: 4.9 MMOL/L — SIGNIFICANT CHANGE UP (ref 3.5–5.3)
PROT SERPL-MCNC: 6.6 G/DL — SIGNIFICANT CHANGE UP (ref 6–8.3)
RBC # BLD: 4.19 M/UL — LOW (ref 4.2–5.8)
RBC # FLD: 14.6 % — HIGH (ref 10.3–14.5)
RBC BLD AUTO: SIGNIFICANT CHANGE UP
SODIUM SERPL-SCNC: 139 MMOL/L — SIGNIFICANT CHANGE UP (ref 135–145)
WBC # BLD: 29.14 K/UL — HIGH (ref 3.8–10.5)
WBC # FLD AUTO: 29.14 K/UL — HIGH (ref 3.8–10.5)

## 2022-05-06 PROCEDURE — 71045 X-RAY EXAM CHEST 1 VIEW: CPT | Mod: 26

## 2022-05-06 PROCEDURE — 71045 X-RAY EXAM CHEST 1 VIEW: CPT | Mod: 26,77

## 2022-05-06 PROCEDURE — 99232 SBSQ HOSP IP/OBS MODERATE 35: CPT

## 2022-05-06 RX ORDER — AMLODIPINE BESYLATE 2.5 MG/1
10 TABLET ORAL DAILY
Refills: 0 | Status: DISCONTINUED | OUTPATIENT
Start: 2022-05-07 | End: 2022-05-09

## 2022-05-06 RX ORDER — METOPROLOL TARTRATE 50 MG
25 TABLET ORAL EVERY 6 HOURS
Refills: 0 | Status: DISCONTINUED | OUTPATIENT
Start: 2022-05-06 | End: 2022-05-07

## 2022-05-06 RX ORDER — AMLODIPINE BESYLATE 2.5 MG/1
5 TABLET ORAL ONCE
Refills: 0 | Status: COMPLETED | OUTPATIENT
Start: 2022-05-06 | End: 2022-05-06

## 2022-05-06 RX ADMIN — POLYETHYLENE GLYCOL 3350 17 GRAM(S): 17 POWDER, FOR SOLUTION ORAL at 11:24

## 2022-05-06 RX ADMIN — Medication 650 MILLIGRAM(S): at 00:29

## 2022-05-06 RX ADMIN — Medication 25 MILLIGRAM(S): at 17:04

## 2022-05-06 RX ADMIN — Medication 25 MILLIGRAM(S): at 11:23

## 2022-05-06 RX ADMIN — GABAPENTIN 100 MILLIGRAM(S): 400 CAPSULE ORAL at 05:59

## 2022-05-06 RX ADMIN — Medication 100 MILLIGRAM(S): at 00:26

## 2022-05-06 RX ADMIN — Medication 650 MILLIGRAM(S): at 23:55

## 2022-05-06 RX ADMIN — AMLODIPINE BESYLATE 5 MILLIGRAM(S): 2.5 TABLET ORAL at 05:58

## 2022-05-06 RX ADMIN — Medication 500 MILLIGRAM(S): at 17:04

## 2022-05-06 RX ADMIN — ATORVASTATIN CALCIUM 80 MILLIGRAM(S): 80 TABLET, FILM COATED ORAL at 21:44

## 2022-05-06 RX ADMIN — AMLODIPINE BESYLATE 5 MILLIGRAM(S): 2.5 TABLET ORAL at 13:34

## 2022-05-06 RX ADMIN — Medication 1 TABLET(S): at 11:24

## 2022-05-06 RX ADMIN — PANTOPRAZOLE SODIUM 40 MILLIGRAM(S): 20 TABLET, DELAYED RELEASE ORAL at 05:58

## 2022-05-06 RX ADMIN — Medication 650 MILLIGRAM(S): at 17:34

## 2022-05-06 RX ADMIN — Medication 650 MILLIGRAM(S): at 05:59

## 2022-05-06 RX ADMIN — Medication 25 MILLIGRAM(S): at 05:59

## 2022-05-06 RX ADMIN — Medication 500 MILLIGRAM(S): at 05:59

## 2022-05-06 RX ADMIN — CHLORHEXIDINE GLUCONATE 5 MILLILITER(S): 213 SOLUTION TOPICAL at 05:58

## 2022-05-06 RX ADMIN — Medication 650 MILLIGRAM(S): at 11:24

## 2022-05-06 RX ADMIN — Medication 650 MILLIGRAM(S): at 11:55

## 2022-05-06 RX ADMIN — Medication 650 MILLIGRAM(S): at 06:45

## 2022-05-06 RX ADMIN — Medication 650 MILLIGRAM(S): at 17:04

## 2022-05-06 RX ADMIN — Medication 650 MILLIGRAM(S): at 00:36

## 2022-05-06 RX ADMIN — GABAPENTIN 100 MILLIGRAM(S): 400 CAPSULE ORAL at 13:04

## 2022-05-06 RX ADMIN — Medication 650 MILLIGRAM(S): at 23:00

## 2022-05-06 RX ADMIN — GABAPENTIN 100 MILLIGRAM(S): 400 CAPSULE ORAL at 21:45

## 2022-05-06 RX ADMIN — Medication 81 MILLIGRAM(S): at 11:23

## 2022-05-06 RX ADMIN — Medication 25 MILLIGRAM(S): at 23:12

## 2022-05-06 RX ADMIN — ENOXAPARIN SODIUM 40 MILLIGRAM(S): 100 INJECTION SUBCUTANEOUS at 11:23

## 2022-05-06 RX ADMIN — SENNA PLUS 2 TABLET(S): 8.6 TABLET ORAL at 21:45

## 2022-05-06 RX ADMIN — CHLORHEXIDINE GLUCONATE 1 APPLICATION(S): 213 SOLUTION TOPICAL at 11:37

## 2022-05-06 NOTE — PROGRESS NOTE ADULT - SUBJECTIVE AND OBJECTIVE BOX
[facility] -- Na Amin, Hakan, Olga Lidia, Edy Gill Savella  Office # [phone]      Follow Up:  AVR    Subjective/Observations: Patient seen and examined. Events noted. Resting comfortably in bed. No complaints of chest pain, dyspnea, or palpitations reported. No signs of orthopnea or PND.       REVIEW OF SYSTEMS: All other review of systems is negative unless indicated above    PAST MEDICAL & SURGICAL HISTORY:  Hypertension    Testosterone deficiency    H/O hyperlipidemia    Renal colic    Unilateral inguinal hernia with obstruction and without gangrene, recurrence not specified    Heart murmur    Obstructive sleep apnea    H/O aortic valve stenosis    Status post medial meniscus repair  2015    Collapsed lung    H/O lithotripsy        MEDICATIONS  (STANDING):  acetaminophen     Tablet .. 650 milliGRAM(s) Oral every 6 hours  amLODIPine   Tablet 5 milliGRAM(s) Oral daily  ascorbic acid 500 milliGRAM(s) Oral two times a day  aspirin enteric coated 81 milliGRAM(s) Oral daily  atorvastatin 80 milliGRAM(s) Oral at bedtime  bisacodyl Suppository 10 milliGRAM(s) Rectal once  chlorhexidine 0.12% Liquid 5 milliLiter(s) Oral Mucosa two times a day  chlorhexidine 2% Cloths 1 Application(s) Topical daily  enoxaparin Injectable 40 milliGRAM(s) SubCutaneous every 24 hours  gabapentin 100 milliGRAM(s) Oral every 8 hours  insulin lispro (ADMELOG) corrective regimen sliding scale   SubCutaneous Before meals and at bedtime  insulin regular Infusion 3 Unit(s)/Hr (3 mL/Hr) IV Continuous <Continuous>  metoprolol tartrate 25 milliGRAM(s) Oral every 6 hours  multivitamin 1 Tablet(s) Oral daily  pantoprazole    Tablet 40 milliGRAM(s) Oral before breakfast  polyethylene glycol 3350 17 Gram(s) Oral daily  senna 2 Tablet(s) Oral at bedtime  sodium chloride 0.9%. 1000 milliLiter(s) (10 mL/Hr) IV Continuous <Continuous>    MEDICATIONS  (PRN):  HYDROmorphone  Injectable 0.5 milliGRAM(s) IV Push every 6 hours PRN Severe Pain (7 - 10)  oxyCODONE    IR 5 milliGRAM(s) Oral every 4 hours PRN Moderate Pain (4 - 6)  oxyCODONE    IR 10 milliGRAM(s) Oral every 4 hours PRN Severe Pain (7 - 10)      Allergies    penicillin (Urticaria; Rash)  pollen itchy eye (Eye Irritation)  sulfa drugs (Urticaria; Rash)    Intolerances            Vital Signs Last 24 Hrs  T(C): 36.8 (06 May 2022 07:15), Max: 37.7 (05 May 2022 16:00)  T(F): 98.3 (06 May 2022 07:15), Max: 99.9 (05 May 2022 16:00)  HR: 69 (06 May 2022 07:15) (68 - 91)  BP: 141/67 (06 May 2022 07:15) (124/58 - 151/74)  BP(mean): 97 (06 May 2022 07:15) (85 - 105)  RR: 18 (06 May 2022 07:15) (5 - 29)  SpO2: 92% (06 May 2022 07:15) (92% - 97%)    I&O's Summary    05 May 2022 07:01  -  06 May 2022 07:00  --------------------------------------------------------  IN: 481 mL / OUT: 1610 mL / NET: -1129 mL    06 May 2022 07:01  -  06 May 2022 08:46  --------------------------------------------------------  IN: 120 mL / OUT: 0 mL / NET: 120 mL          PHYSICAL EXAM:  TELE:   Constitutional: NAD, awake    HEENT: Moist Mucous Membranes, Anicteric  Pulmonary: Decreased breath sounds b/l. No rales, crackles or wheeze appreciated.   Cardiovascular: Regular, S1 and S2, No murmurs, rubs, gallops or clicks  Gastrointestinal: Bowel Sounds present, soft, nontender.   Lymph: No peripheral edema. No lymphadenopathy.  Skin: No visible rashes or ulcers.  Psych:  Mood & affect appropriate for situation    LABS: All Labs Reviewed:                        11.4 29.14 )-----------( 124      ( 06 May 2022 05:02 )             35.3                         11.2   15.71 )-----------( 102      ( 05 May 2022 00:12 )             35.0                         13.5   22.61 )-----------( 127      ( 04 May 2022 13:28 )             41.0     06 May 2022 05:01    139    |  106    |  30     ----------------------------<  132    4.9     |  24     |  0.97   05 May 2022 00:12    140    |  105    |  20     ----------------------------<  126    4.1     |  23     |  0.89   04 May 2022 13:28    141    |  106    |  20     ----------------------------<  170    4.3     |  21     |  0.83     Ca    9.1        06 May 2022 05:01  Ca    8.7        05 May 2022 00:12  Ca    9.0        04 May 2022 13:28  Phos  3.1       06 May 2022 05:01  Phos  2.7       05 May 2022 00:12  Phos  3.1       04 May 2022 13:28  Mg     2.4       06 May 2022 05:01  Mg     2.0       05 May 2022 00:12  Mg     2.8       04 May 2022 13:28    TPro  6.6    /  Alb  3.9    /  TBili  0.4    /  DBili  x      /  AST  25     /  ALT  13     /  AlkPhos  40     06 May 2022 05:01  TPro  6.2    /  Alb  4.0    /  TBili  0.5    /  DBili  x      /  AST  35     /  ALT  12     /  AlkPhos  35     05 May 2022 00:12  TPro  5.8    /  Alb  3.5    /  TBili  0.9    /  DBili  x      /  AST  36     /  ALT  13     /  AlkPhos  46     04 May 2022 13:28    PT/INR - ( 04 May 2022 13:28 )   PT: 13.6 sec;   INR: 1.18 ratio         PTT - ( 04 May 2022 13:28 )  PTT:27.1 sec  CARDIAC MARKERS ( 04 May 2022 13:28 )  x     / x     / 304 U/L / x     / 43.1 ng/mL

## 2022-05-06 NOTE — PROGRESS NOTE ADULT - ASSESSMENT
Rosalio is a 66 year old male with WAQAS, HTN, HLD with WHALEY and found to have severe as/ai, and is now s/p bio avr on 5/4.    - doing well post-operatively, off pressors and inotropes  - hemodynamically stable this am  - cont asa and statin  - cont bb  - cont norvasc  - no sign of acute ischemia or volume overload  - remains in a sr on telemetry  - cath with mild non obs cad and ef 51% intraoperatively  - oxygen supplementation as needed  - trend creatinine and electrolytes. keep K>4, mg>2  - will follow with you postoperatively

## 2022-05-06 NOTE — PROGRESS NOTE ADULT - ASSESSMENT
Patient is a 66M with PMH of WAQAS on nocturnal CPAP, HTN, HLD, AS, kidney stones, COVID infection Dec 2021 (not hospitalized) and pre DM.  Patient had progressive WHALEY since 2018, at which time he was diagnosed with AS and for approximately the last 6 months has had increasing WHALEY. Repeat ECHO was done 3/14/2022 revealing min MR, severe AS , mild AR, EF 75%.  Patient was referred to Dr Micah Cameron for AVR scheduled.  Patient is now s/p AVR on 5/4/2022 with Dr. Cameron.  No intra-op blood products, now tx from CTU to SDU.      5/4/2022- AVR-- Size 23mm Inspiris bioprosthetic aortic valve replacement, EF 50%, mild MR, no PVL.  No intra-op blood products given. V-wires.  Mediastinal chest tubesx2.  Extubated.     5/5/2022- On ASA 81mg, Lovenox 40mg QD. Lopressor 25mg Q8 started.  Norvasc 5mg started for hypertension. TX to [facility] SDU.  RIJ central line/R-radial yuliet in place-will maintain overnight re-assess in AM to de-intensify.  +Oliveira draining clear yellow urine, maintain overnight.  V-Wires to box VVI 50/10 monitor pacing requirement  Patient is a 66M with PMH of WAQAS on nocturnal CPAP, HTN, HLD, AS, kidney stones, COVID infection Dec 2021 (not hospitalized) and pre DM.  Patient had progressive WHALEY since 2018, at which time he was diagnosed with AS and for approximately the last 6 months has had increasing WHALEY. Repeat ECHO was done 3/14/2022 revealing min MR, severe AS , mild AR, EF 75%.  Patient was referred to Dr Micah Cameron for AVR scheduled.  Patient is now s/p AVR on 5/4/2022 with Dr. Cameron.  No intra-op blood products, now tx from CTU to SDU.      5/4/2022- AVR-- Size 23mm Inspiris bioprosthetic aortic valve replacement, EF 50%, mild MR, no PVL.  No intra-op blood products given. V-wires.  Mediastinal chest tubesx2.  Extubated.     5/5/2022- On ASA 81mg, Lovenox 40mg QD. Lopressor 25mg Q8 started.  Norvasc 5mg started for hypertension. TX to [facility] SDU.  RIJ central line/R-radial yuliet in place-will maintain overnight re-assess in AM to de-intensify.  +Delgado draining clear yellow urine, maintain overnight.  V-Wires to box VVI 50/10 monitor pacing requirement - pt received hydralazine for HTN --> errythema noted on chest  5/6 VSS: rsr 60-70; increase lop 25 mg po q6 as per DR. Cameron and increase norvasc 10 qd for HTN; d/c rij/yuliet/ delgado; pt DTV; meds d/c; tx floor; no further hydralazine secondary to allergy- medication added to EMR  discharge planning- home when stable sun/mon

## 2022-05-06 NOTE — PROGRESS NOTE ADULT - PROBLEM SELECTOR PLAN 1
s/p AVR on 5/4/2022 for AS with Dr. Cameron     Tx to SDU 5/5/2022    -Continue ASA 81mg, Lovenox 40mg QD  -Continue Lopressor 25mg Q8 and up-titrate as tolerated.    -Continue Norvasc 5mg QD  -Continue statin  -Continue mediastinal drains to suction, monitor output   -Maintain right radial yuliet/rij central line overnight  -Glycemic control with insulin gtt per protocol  -Oliveira overnight to monitor urine output.    -Maintain V-Wires to pacing box VVI 50/10  - Chest Xray in AM  -Continue bowel regimen--pt due for BM     -Patient encouraged to ambulate as tolerated, continue cough/deep breathing exercises along with incentive spirometry continue postop care  continue asa and statin   increase norvasc 10 qd for htn  increase lop 25 mg po q6 for HR control  +pw- VVI 50  d/c meds/ yuliet/ jose/ sandy--> pt DTV  tx floor today  pulm toilet  pain management  increase activity as tolerated  Discharge planning- home when stable sun/mon

## 2022-05-06 NOTE — PROGRESS NOTE ADULT - SUBJECTIVE AND OBJECTIVE BOX
VITAL SIGNS    Telemetry:    Vital Signs Last 24 Hrs  T(C): 36.8 (22 @ 07:15), Max: 37.7 (22 @ 16:00)  T(F): 98.3 (22 @ 07:15), Max: 99.9 (22 @ 16:00)  HR: 69 (22 07:15) (68 - 90)  BP: 141/67 (22 @ 07:15) (124/58 - 151/74)  RR: 18 (22 07:15) (5 - 29)  SpO2: 92% (22 @ 07:15) (92% - 96%)             07:01  -   07:00  --------------------------------------------------------  IN: 481 mL / OUT: 1610 mL / NET: -1129 mL     07:01  -   @ 09:39  --------------------------------------------------------  IN: 120 mL / OUT: 0 mL / NET: 120 mL       Daily     Daily Weight in k.8 (06 May 2022 09:03)  Admit Wt: Drug Dosing Weight  Height (cm): 167.6 (04 May 2022 06:45)  Weight (kg): 95.3 (04 May 2022 06:45)  BMI (kg/m2): 33.9 (04 May 2022 06:45)  BSA (m2): 2.04 (04 May 2022 06:45)    Bilirubin Total, Serum: 0.4 mg/dL ( @ 05:01)    CAPILLARY BLOOD GLUCOSE      POCT Blood Glucose.: 120 mg/dL (06 May 2022 07:36)  POCT Blood Glucose.: 123 mg/dL (06 May 2022 04:43)  POCT Blood Glucose.: 100 mg/dL (06 May 2022 03:53)  POCT Blood Glucose.: 110 mg/dL (06 May 2022 02:34)  POCT Blood Glucose.: 110 mg/dL (06 May 2022 01:33)  POCT Blood Glucose.: 127 mg/dL (06 May 2022 00:34)  POCT Blood Glucose.: 156 mg/dL (05 May 2022 23:25)  POCT Blood Glucose.: 95 mg/dL (05 May 2022 22:15)  POCT Blood Glucose.: 129 mg/dL (05 May 2022 20:55)  POCT Blood Glucose.: 145 mg/dL (05 May 2022 19:57)  POCT Blood Glucose.: 165 mg/dL (05 May 2022 18:45)  POCT Blood Glucose.: 157 mg/dL (05 May 2022 17:59)  POCT Blood Glucose.: 168 mg/dL (05 May 2022 17:10)  POCT Blood Glucose.: 146 mg/dL (05 May 2022 16:04)  POCT Blood Glucose.: 173 mg/dL (05 May 2022 14:49)  POCT Blood Glucose.: 152 mg/dL (05 May 2022 14:11)  POCT Blood Glucose.: 158 mg/dL (05 May 2022 12:52)  POCT Blood Glucose.: 152 mg/dL (05 May 2022 12:18)  POCT Blood Glucose.: 178 mg/dL (05 May 2022 11:19)          acetaminophen     Tablet .. 650 milliGRAM(s) Oral every 6 hours  amLODIPine   Tablet 5 milliGRAM(s) Oral daily  ascorbic acid 500 milliGRAM(s) Oral two times a day  aspirin enteric coated 81 milliGRAM(s) Oral daily  atorvastatin 80 milliGRAM(s) Oral at bedtime  bisacodyl Suppository 10 milliGRAM(s) Rectal once  chlorhexidine 0.12% Liquid 5 milliLiter(s) Oral Mucosa two times a day  chlorhexidine 2% Cloths 1 Application(s) Topical daily  enoxaparin Injectable 40 milliGRAM(s) SubCutaneous every 24 hours  gabapentin 100 milliGRAM(s) Oral every 8 hours  HYDROmorphone  Injectable 0.5 milliGRAM(s) IV Push every 6 hours PRN  insulin lispro (ADMELOG) corrective regimen sliding scale   SubCutaneous Before meals and at bedtime  insulin regular Infusion 3 Unit(s)/Hr IV Continuous <Continuous>  metoprolol tartrate 25 milliGRAM(s) Oral every 6 hours  multivitamin 1 Tablet(s) Oral daily  oxyCODONE    IR 5 milliGRAM(s) Oral every 4 hours PRN  oxyCODONE    IR 10 milliGRAM(s) Oral every 4 hours PRN  pantoprazole    Tablet 40 milliGRAM(s) Oral before breakfast  polyethylene glycol 3350 17 Gram(s) Oral daily  senna 2 Tablet(s) Oral at bedtime  sodium chloride 0.9%. 1000 milliLiter(s) IV Continuous <Continuous>      PHYSICAL EXAM    Subjective: "Hi.   Neurology: alert and oriented x 3, nonfocal, no gross deficits  CV : tele:  RSR  Sternal Wound :  CDI with dressing , Stable  Lungs: clear. RR easy, unlabored   Abdomen: soft, nontender, nondistended, positive bowel sounds, bowel movement   Neg N/V/D   :  pt voiding without difficulty   Extremities:   MCKEON; edema, neg calf tenderness.   PPP bilaterally      PW:  Chest tubes:                 VITAL SIGNS    Telemetry:  rsr 60-70   Vital Signs Last 24 Hrs  T(C): 36.8 (22 @ 07:15), Max: 37.7 (22 @ 16:00)  T(F): 98.3 (22 @ 07:15), Max: 99.9 (22 @ 16:00)  HR: 69 (22 07:15) (68 - 90)  BP: 141/67 (22 @ 07:15) (124/58 - 151/74)  RR: 18 (22 @ 07:15) (5 - 29)  SpO2: 92% (22 @ 07:15) (92% - 96%)             07:01  -   07:00  --------------------------------------------------------  IN: 481 mL / OUT: 1610 mL / NET: -1129 mL     07:01  -   @ 09:39  --------------------------------------------------------  IN: 120 mL / OUT: 0 mL / NET: 120 mL       Daily     Daily Weight in k.8 (06 May 2022 09:03)  Admit Wt: Drug Dosing Weight  Height (cm): 167.6 (04 May 2022 06:45)  Weight (kg): 95.3 (04 May 2022 06:45)  BMI (kg/m2): 33.9 (04 May 2022 06:45)  BSA (m2): 2.04 (04 May 2022 06:45)    Bilirubin Total, Serum: 0.4 mg/dL ( @ 05:01)    CAPILLARY BLOOD GLUCOSE      POCT Blood Glucose.: 120 mg/dL (06 May 2022 07:36)  POCT Blood Glucose.: 123 mg/dL (06 May 2022 04:43)  POCT Blood Glucose.: 100 mg/dL (06 May 2022 03:53)  POCT Blood Glucose.: 110 mg/dL (06 May 2022 02:34)  POCT Blood Glucose.: 110 mg/dL (06 May 2022 01:33)  POCT Blood Glucose.: 127 mg/dL (06 May 2022 00:34)  POCT Blood Glucose.: 156 mg/dL (05 May 2022 23:25)  POCT Blood Glucose.: 95 mg/dL (05 May 2022 22:15)  POCT Blood Glucose.: 129 mg/dL (05 May 2022 20:55)  POCT Blood Glucose.: 145 mg/dL (05 May 2022 19:57)  POCT Blood Glucose.: 165 mg/dL (05 May 2022 18:45)  POCT Blood Glucose.: 157 mg/dL (05 May 2022 17:59)  POCT Blood Glucose.: 168 mg/dL (05 May 2022 17:10)  POCT Blood Glucose.: 146 mg/dL (05 May 2022 16:04)  POCT Blood Glucose.: 173 mg/dL (05 May 2022 14:49)  POCT Blood Glucose.: 152 mg/dL (05 May 2022 14:11)  POCT Blood Glucose.: 158 mg/dL (05 May 2022 12:52)  POCT Blood Glucose.: 152 mg/dL (05 May 2022 12:18)  POCT Blood Glucose.: 178 mg/dL (05 May 2022 11:19)          acetaminophen     Tablet .. 650 milliGRAM(s) Oral every 6 hours  amLODIPine   Tablet 5 milliGRAM(s) Oral daily  ascorbic acid 500 milliGRAM(s) Oral two times a day  aspirin enteric coated 81 milliGRAM(s) Oral daily  atorvastatin 80 milliGRAM(s) Oral at bedtime  bisacodyl Suppository 10 milliGRAM(s) Rectal once  chlorhexidine 0.12% Liquid 5 milliLiter(s) Oral Mucosa two times a day  chlorhexidine 2% Cloths 1 Application(s) Topical daily  enoxaparin Injectable 40 milliGRAM(s) SubCutaneous every 24 hours  gabapentin 100 milliGRAM(s) Oral every 8 hours  HYDROmorphone  Injectable 0.5 milliGRAM(s) IV Push every 6 hours PRN  insulin lispro (ADMELOG) corrective regimen sliding scale   SubCutaneous Before meals and at bedtime  insulin regular Infusion 3 Unit(s)/Hr IV Continuous <Continuous>  metoprolol tartrate 25 milliGRAM(s) Oral every 6 hours  multivitamin 1 Tablet(s) Oral daily  oxyCODONE    IR 5 milliGRAM(s) Oral every 4 hours PRN  oxyCODONE    IR 10 milliGRAM(s) Oral every 4 hours PRN  pantoprazole    Tablet 40 milliGRAM(s) Oral before breakfast  polyethylene glycol 3350 17 Gram(s) Oral daily  senna 2 Tablet(s) Oral at bedtime  sodium chloride 0.9%. 1000 milliLiter(s) IV Continuous <Continuous>      PHYSICAL EXAM    Subjective: "I feel ok."   Neurology: alert and oriented x 3, nonfocal, no gross deficits  CV : tele:  RSR 60-70   rij cdi w/ dressing   Sternal Wound :  CDI with dressing , Stable; +pw VVI 50   Lungs: clear. RR easy, unlabored   Abdomen: soft, nontender, nondistended, positive bowel sounds, neg bowel movement   Neg N/V/D; obese abdomen; + flatus   :  delgado d/c- pt DTV   Extremities:   MCKEON; neg LE edema, neg calf tenderness.   PPP bilaterally; rt radial yuliet intact     PW: + VVI 50   Chest tubes: 2 mediastinal ct draining minimal serous- sang drainage                 VITAL SIGNS    Telemetry:  rsr 60-70   Vital Signs Last 24 Hrs  T(C): 36.8 (22 @ 07:15), Max: 37.7 (22 @ 16:00)  T(F): 98.3 (22 @ 07:15), Max: 99.9 (22 @ 16:00)  HR: 69 (22 07:15) (68 - 90)  BP: 141/67 (22 @ 07:15) (124/58 - 151/74)  RR: 18 (22 @ 07:15) (5 - 29)  SpO2: 92% (22 @ 07:15) (92% - 96%)             07:01  -   07:00  --------------------------------------------------------  IN: 481 mL / OUT: 1610 mL / NET: -1129 mL     07:01  -   @ 09:39  --------------------------------------------------------  IN: 120 mL / OUT: 0 mL / NET: 120 mL       Daily     Daily Weight in k.8 (06 May 2022 09:03)  Admit Wt: Drug Dosing Weight  Height (cm): 167.6 (04 May 2022 06:45)  Weight (kg): 95.3 (04 May 2022 06:45)  BMI (kg/m2): 33.9 (04 May 2022 06:45)  BSA (m2): 2.04 (04 May 2022 06:45)    Bilirubin Total, Serum: 0.4 mg/dL ( @ 05:01)    CAPILLARY BLOOD GLUCOSE      POCT Blood Glucose.: 120 mg/dL (06 May 2022 07:36)  POCT Blood Glucose.: 123 mg/dL (06 May 2022 04:43)  POCT Blood Glucose.: 100 mg/dL (06 May 2022 03:53)  POCT Blood Glucose.: 110 mg/dL (06 May 2022 02:34)  POCT Blood Glucose.: 110 mg/dL (06 May 2022 01:33)  POCT Blood Glucose.: 127 mg/dL (06 May 2022 00:34)  POCT Blood Glucose.: 156 mg/dL (05 May 2022 23:25)  POCT Blood Glucose.: 95 mg/dL (05 May 2022 22:15)  POCT Blood Glucose.: 129 mg/dL (05 May 2022 20:55)  POCT Blood Glucose.: 145 mg/dL (05 May 2022 19:57)  POCT Blood Glucose.: 165 mg/dL (05 May 2022 18:45)  POCT Blood Glucose.: 157 mg/dL (05 May 2022 17:59)  POCT Blood Glucose.: 168 mg/dL (05 May 2022 17:10)  POCT Blood Glucose.: 146 mg/dL (05 May 2022 16:04)  POCT Blood Glucose.: 173 mg/dL (05 May 2022 14:49)  POCT Blood Glucose.: 152 mg/dL (05 May 2022 14:11)  POCT Blood Glucose.: 158 mg/dL (05 May 2022 12:52)  POCT Blood Glucose.: 152 mg/dL (05 May 2022 12:18)  POCT Blood Glucose.: 178 mg/dL (05 May 2022 11:19)          acetaminophen     Tablet .. 650 milliGRAM(s) Oral every 6 hours  amLODIPine   Tablet 5 milliGRAM(s) Oral daily  ascorbic acid 500 milliGRAM(s) Oral two times a day  aspirin enteric coated 81 milliGRAM(s) Oral daily  atorvastatin 80 milliGRAM(s) Oral at bedtime  bisacodyl Suppository 10 milliGRAM(s) Rectal once  chlorhexidine 0.12% Liquid 5 milliLiter(s) Oral Mucosa two times a day  chlorhexidine 2% Cloths 1 Application(s) Topical daily  enoxaparin Injectable 40 milliGRAM(s) SubCutaneous every 24 hours  gabapentin 100 milliGRAM(s) Oral every 8 hours  HYDROmorphone  Injectable 0.5 milliGRAM(s) IV Push every 6 hours PRN  insulin lispro (ADMELOG) corrective regimen sliding scale   SubCutaneous Before meals and at bedtime  insulin regular Infusion 3 Unit(s)/Hr IV Continuous <Continuous>  metoprolol tartrate 25 milliGRAM(s) Oral every 6 hours  multivitamin 1 Tablet(s) Oral daily  oxyCODONE    IR 5 milliGRAM(s) Oral every 4 hours PRN  oxyCODONE    IR 10 milliGRAM(s) Oral every 4 hours PRN  pantoprazole    Tablet 40 milliGRAM(s) Oral before breakfast  polyethylene glycol 3350 17 Gram(s) Oral daily  senna 2 Tablet(s) Oral at bedtime  sodium chloride 0.9%. 1000 milliLiter(s) IV Continuous <Continuous>      PHYSICAL EXAM    Subjective: "I feel ok."   Neurology: alert and oriented x 3, nonfocal, no gross deficits  CV : tele:  RSR 60-70   rij cdi w/ dressing   Sternal Wound :  CDI with dressing , Stable; +pw VVI 50; + chest erythema noted   Lungs: clear. RR easy, unlabored   Abdomen: soft, nontender, nondistended, positive bowel sounds, neg bowel movement   Neg N/V/D; obese abdomen; + flatus   :  delgado d/c- pt DTV   Extremities:   MCKEON; neg LE edema, neg calf tenderness.   PPP bilaterally; rt radial yuliet intact     PW: + VVI 50   Chest tubes: 2 mediastinal ct draining minimal serous- sang drainage

## 2022-05-07 LAB
ALBUMIN SERPL ELPH-MCNC: 3.7 G/DL — SIGNIFICANT CHANGE UP (ref 3.3–5)
ALP SERPL-CCNC: 49 U/L — SIGNIFICANT CHANGE UP (ref 40–120)
ALT FLD-CCNC: 14 U/L — SIGNIFICANT CHANGE UP (ref 10–45)
ANION GAP SERPL CALC-SCNC: 10 MMOL/L — SIGNIFICANT CHANGE UP (ref 5–17)
AST SERPL-CCNC: 22 U/L — SIGNIFICANT CHANGE UP (ref 10–40)
BASOPHILS # BLD AUTO: 0.02 K/UL — SIGNIFICANT CHANGE UP (ref 0–0.2)
BASOPHILS NFR BLD AUTO: 0.1 % — SIGNIFICANT CHANGE UP (ref 0–2)
BILIRUB SERPL-MCNC: 0.5 MG/DL — SIGNIFICANT CHANGE UP (ref 0.2–1.2)
BUN SERPL-MCNC: 31 MG/DL — HIGH (ref 7–23)
CALCIUM SERPL-MCNC: 9 MG/DL — SIGNIFICANT CHANGE UP (ref 8.4–10.5)
CHLORIDE SERPL-SCNC: 103 MMOL/L — SIGNIFICANT CHANGE UP (ref 96–108)
CO2 SERPL-SCNC: 26 MMOL/L — SIGNIFICANT CHANGE UP (ref 22–31)
CREAT SERPL-MCNC: 1 MG/DL — SIGNIFICANT CHANGE UP (ref 0.5–1.3)
EGFR: 82 ML/MIN/1.73M2 — SIGNIFICANT CHANGE UP
EOSINOPHIL # BLD AUTO: 0 K/UL — SIGNIFICANT CHANGE UP (ref 0–0.5)
EOSINOPHIL NFR BLD AUTO: 0 % — SIGNIFICANT CHANGE UP (ref 0–6)
GLUCOSE BLDC GLUCOMTR-MCNC: 115 MG/DL — HIGH (ref 70–99)
GLUCOSE BLDC GLUCOMTR-MCNC: 116 MG/DL — HIGH (ref 70–99)
GLUCOSE BLDC GLUCOMTR-MCNC: 126 MG/DL — HIGH (ref 70–99)
GLUCOSE BLDC GLUCOMTR-MCNC: 140 MG/DL — HIGH (ref 70–99)
GLUCOSE SERPL-MCNC: 118 MG/DL — HIGH (ref 70–99)
HCT VFR BLD CALC: 36.4 % — LOW (ref 39–50)
HGB BLD-MCNC: 11.5 G/DL — LOW (ref 13–17)
IMM GRANULOCYTES NFR BLD AUTO: 0.8 % — SIGNIFICANT CHANGE UP (ref 0–1.5)
LYMPHOCYTES # BLD AUTO: 18.7 % — SIGNIFICANT CHANGE UP (ref 13–44)
LYMPHOCYTES # BLD AUTO: 3.67 K/UL — HIGH (ref 1–3.3)
MCHC RBC-ENTMCNC: 26.9 PG — LOW (ref 27–34)
MCHC RBC-ENTMCNC: 31.6 GM/DL — LOW (ref 32–36)
MCV RBC AUTO: 85 FL — SIGNIFICANT CHANGE UP (ref 80–100)
MONOCYTES # BLD AUTO: 1.39 K/UL — HIGH (ref 0–0.9)
MONOCYTES NFR BLD AUTO: 7.1 % — SIGNIFICANT CHANGE UP (ref 2–14)
NEUTROPHILS # BLD AUTO: 14.37 K/UL — HIGH (ref 1.8–7.4)
NEUTROPHILS NFR BLD AUTO: 73.3 % — SIGNIFICANT CHANGE UP (ref 43–77)
NRBC # BLD: 0 /100 WBCS — SIGNIFICANT CHANGE UP (ref 0–0)
PLATELET # BLD AUTO: 127 K/UL — LOW (ref 150–400)
POTASSIUM SERPL-MCNC: 4.6 MMOL/L — SIGNIFICANT CHANGE UP (ref 3.5–5.3)
POTASSIUM SERPL-SCNC: 4.6 MMOL/L — SIGNIFICANT CHANGE UP (ref 3.5–5.3)
PROT SERPL-MCNC: 6.7 G/DL — SIGNIFICANT CHANGE UP (ref 6–8.3)
RBC # BLD: 4.28 M/UL — SIGNIFICANT CHANGE UP (ref 4.2–5.8)
RBC # FLD: 14.9 % — HIGH (ref 10.3–14.5)
SODIUM SERPL-SCNC: 139 MMOL/L — SIGNIFICANT CHANGE UP (ref 135–145)
WBC # BLD: 19.61 K/UL — HIGH (ref 3.8–10.5)
WBC # FLD AUTO: 19.61 K/UL — HIGH (ref 3.8–10.5)

## 2022-05-07 PROCEDURE — 99232 SBSQ HOSP IP/OBS MODERATE 35: CPT

## 2022-05-07 PROCEDURE — 71045 X-RAY EXAM CHEST 1 VIEW: CPT | Mod: 26

## 2022-05-07 RX ORDER — METOPROLOL TARTRATE 50 MG
100 TABLET ORAL DAILY
Refills: 0 | Status: DISCONTINUED | OUTPATIENT
Start: 2022-05-07 | End: 2022-05-09

## 2022-05-07 RX ORDER — ETHACRYNIC ACID 25 MG/1
25 TABLET ORAL DAILY
Refills: 0 | Status: DISCONTINUED | OUTPATIENT
Start: 2022-05-07 | End: 2022-05-09

## 2022-05-07 RX ADMIN — Medication 650 MILLIGRAM(S): at 05:00

## 2022-05-07 RX ADMIN — Medication 650 MILLIGRAM(S): at 12:12

## 2022-05-07 RX ADMIN — POLYETHYLENE GLYCOL 3350 17 GRAM(S): 17 POWDER, FOR SOLUTION ORAL at 12:06

## 2022-05-07 RX ADMIN — ETHACRYNIC ACID 25 MILLIGRAM(S): 25 TABLET ORAL at 11:00

## 2022-05-07 RX ADMIN — Medication 500 MILLIGRAM(S): at 05:02

## 2022-05-07 RX ADMIN — Medication 650 MILLIGRAM(S): at 05:45

## 2022-05-07 RX ADMIN — ATORVASTATIN CALCIUM 80 MILLIGRAM(S): 80 TABLET, FILM COATED ORAL at 21:15

## 2022-05-07 RX ADMIN — GABAPENTIN 100 MILLIGRAM(S): 400 CAPSULE ORAL at 05:02

## 2022-05-07 RX ADMIN — Medication 500 MILLIGRAM(S): at 17:27

## 2022-05-07 RX ADMIN — Medication 25 MILLIGRAM(S): at 05:03

## 2022-05-07 RX ADMIN — CHLORHEXIDINE GLUCONATE 5 MILLILITER(S): 213 SOLUTION TOPICAL at 05:03

## 2022-05-07 RX ADMIN — Medication 1 TABLET(S): at 12:12

## 2022-05-07 RX ADMIN — Medication 650 MILLIGRAM(S): at 12:45

## 2022-05-07 RX ADMIN — ENOXAPARIN SODIUM 40 MILLIGRAM(S): 100 INJECTION SUBCUTANEOUS at 11:00

## 2022-05-07 RX ADMIN — GABAPENTIN 100 MILLIGRAM(S): 400 CAPSULE ORAL at 14:20

## 2022-05-07 RX ADMIN — CHLORHEXIDINE GLUCONATE 5 MILLILITER(S): 213 SOLUTION TOPICAL at 17:28

## 2022-05-07 RX ADMIN — CHLORHEXIDINE GLUCONATE 1 APPLICATION(S): 213 SOLUTION TOPICAL at 10:00

## 2022-05-07 RX ADMIN — SENNA PLUS 2 TABLET(S): 8.6 TABLET ORAL at 21:15

## 2022-05-07 RX ADMIN — AMLODIPINE BESYLATE 10 MILLIGRAM(S): 2.5 TABLET ORAL at 05:04

## 2022-05-07 RX ADMIN — PANTOPRAZOLE SODIUM 40 MILLIGRAM(S): 20 TABLET, DELAYED RELEASE ORAL at 05:03

## 2022-05-07 RX ADMIN — Medication 100 MILLIGRAM(S): at 12:12

## 2022-05-07 RX ADMIN — Medication 81 MILLIGRAM(S): at 12:12

## 2022-05-07 RX ADMIN — GABAPENTIN 100 MILLIGRAM(S): 400 CAPSULE ORAL at 21:15

## 2022-05-07 NOTE — PROGRESS NOTE ADULT - ASSESSMENT
Rosalio is a 66 year old male with WAQAS, HTN, HLD with WHALEY and found to have severe as/ai, and is now s/p bio avr on 5/4.    - doing well post-operatively, off pressors and inotropes  - hemodynamically stable this am  - cont asa and statin  - cont bb  - cont norvasc  - no sign of acute ischemia  - started on Edecrin  - remains in a sr on telemetry  - cath with mild non obs cad and ef 51% intraoperatively  - oxygen supplementation as needed  - trend creatinine and electrolytes. keep K>4, mg>2  - will follow with you postoperatively

## 2022-05-07 NOTE — PROGRESS NOTE ADULT - ASSESSMENT
Patient is a 66M with PMH of WAQAS on nocturnal CPAP, HTN, HLD, AS, kidney stones, COVID infection Dec 2021 (not hospitalized) and pre DM.  Patient had progressive WHALEY since 2018, at which time he was diagnosed with AS and for approximately the last 6 months has had increasing WHALEY. Repeat ECHO was done 3/14/2022 revealing min MR, severe AS , mild AR, EF 75%.  Patient was referred to Dr Micah Cameron for AVR scheduled.  Patient is now s/p AVR on 5/4/2022 with Dr. Cameron.  No intra-op blood products, now tx from CTU to SDU.      5/4/2022- AVR-- Size 23mm Inspiris bioprosthetic aortic valve replacement, EF 50%, mild MR, no PVL.  No intra-op blood products given. V-wires.  Mediastinal chest tubesx2.  Extubated.     5/5/2022- On ASA 81mg, Lovenox 40mg QD. Lopressor 25mg Q8 started.  Norvasc 5mg started for hypertension. TX to [facility] SDU.  RIJ central line/R-radial yuliet in place-will maintain overnight re-assess in AM to de-intensify.  +Delgado draining clear yellow urine, maintain overnight.  V-Wires to box VVI 50/10 monitor pacing requirement - pt received hydralazine for HTN --> errythema noted on chest  5/6 VSS: rsr 60-70; increase lop 25 mg po q6 as per DR. Cameron and increase norvasc 10 qd for HTN; d/c rij/yuliet/ delgado; pt DTV; meds d/c; tx floor; no further hydralazine secondary to allergy- medication added to EMR  discharge planning- home when stable sun/mon Patient is a 66M with PMH of WAQAS on nocturnal CPAP, HTN, HLD, AS, kidney stones, COVID infection Dec 2021 (not hospitalized) and pre DM.  Patient had progressive WHALEY since 2018, at which time he was diagnosed with AS and for approximately the last 6 months has had increasing WHALEY. Repeat ECHO was done 3/14/2022 revealing min MR, severe AS , mild AR, EF 75%.  Patient was referred to Dr Micah Cameron for AVR scheduled.  Patient is now s/p AVR on 5/4/2022 with Dr. Cameron.  No intra-op blood products, now tx from CTU to SDU.      5/4/2022- AVR-- Size 23mm Inspiris bioprosthetic aortic valve replacement, EF 50%, mild MR, no PVL.  No intra-op blood products given. V-wires.  Mediastinal chest tubesx2.  Extubated.     5/5/2022- On ASA 81mg, Lovenox 40mg QD. Lopressor 25mg Q8 started.  Norvasc 5mg started for hypertension. TX to [facility]U.  RIJ central line/R-radial yuliet in place-will maintain overnight re-assess in AM to de-intensify.  +Delgado draining clear yellow urine, maintain overnight.  V-Wires to box VVI 50/10 monitor pacing requirement - pt received hydralazine for HTN --> errythema noted on chest  5/6 VSS: rsr 60-70; increase lop 25 mg po q6 as per DR. Cameron and increase norvasc 10 qd for HTN; d/c rij/yuliet/ delgado; pt DTV; meds d/c; tx floor; no further hydralazine secondary to allergy- medication added to EMR  5/7 VSS; RSR 60-90; lop d/c and changed to toprol 100 qd as per Dr. Cameron; pw d/c this am; + hypervolemia- initiate diuretics   discharge planning- home in am sun if stable overnight

## 2022-05-07 NOTE — PROGRESS NOTE ADULT - SUBJECTIVE AND OBJECTIVE BOX
VITAL SIGNS    Telemetry:    Vital Signs Last 24 Hrs  T(C): 36.7 (22 @ 04:34), Max: 36.8 (22 @ 11:20)  T(F): 98.1 (22 @ 04:34), Max: 98.3 (22 @ 11:20)  HR: 86 (22 @ 06:11) (62 - 89)  BP: 135/80 (22 @ 04:34) (124/64 - 147/70)  RR: 18 (22 @ 04:34) (18 - 18)  SpO2: 93% (22 @ 06:11) (92% - 96%)             07:01  -   07:00  --------------------------------------------------------  IN: 910 mL / OUT: 2275 mL / NET: -1365 mL       Daily     Daily Weight in k.8 (06 May 2022 09:03)  Admit Wt: Drug Dosing Weight  Height (cm): 167.6 (04 May 2022 06:45)  Weight (kg): 95.3 (04 May 2022 06:45)  BMI (kg/m2): 33.9 (04 May 2022 06:45)  BSA (m2): 2.04 (04 May 2022 06:45)    Bilirubin Total, Serum: 0.5 mg/dL ( 06:32)    CAPILLARY BLOOD GLUCOSE      POCT Blood Glucose.: 116 mg/dL (07 May 2022 07:45)  POCT Blood Glucose.: 128 mg/dL (06 May 2022 21:09)  POCT Blood Glucose.: 149 mg/dL (06 May 2022 16:30)  POCT Blood Glucose.: 139 mg/dL (06 May 2022 11:29)          acetaminophen     Tablet .. 650 milliGRAM(s) Oral every 6 hours  acetaminophen     Tablet .. 650 milliGRAM(s) Oral every 6 hours PRN  amLODIPine   Tablet 10 milliGRAM(s) Oral daily  ascorbic acid 500 milliGRAM(s) Oral two times a day  aspirin enteric coated 81 milliGRAM(s) Oral daily  atorvastatin 80 milliGRAM(s) Oral at bedtime  bisacodyl Suppository 10 milliGRAM(s) Rectal once  chlorhexidine 0.12% Liquid 5 milliLiter(s) Oral Mucosa two times a day  chlorhexidine 2% Cloths 1 Application(s) Topical daily  enoxaparin Injectable 40 milliGRAM(s) SubCutaneous every 24 hours  gabapentin 100 milliGRAM(s) Oral every 8 hours  insulin lispro (ADMELOG) corrective regimen sliding scale   SubCutaneous Before meals and at bedtime  metoprolol succinate  milliGRAM(s) Oral daily  multivitamin 1 Tablet(s) Oral daily  oxyCODONE    IR 5 milliGRAM(s) Oral every 4 hours PRN  oxyCODONE    IR 10 milliGRAM(s) Oral every 4 hours PRN  pantoprazole    Tablet 40 milliGRAM(s) Oral before breakfast  polyethylene glycol 3350 17 Gram(s) Oral daily  senna 2 Tablet(s) Oral at bedtime  sodium chloride 0.9%. 1000 milliLiter(s) IV Continuous <Continuous>      PHYSICAL EXAM    Subjective: "Hi.   Neurology: alert and oriented x 3, nonfocal, no gross deficits  CV : tele:  RSR  Sternal Wound :  CDI with dressing , Stable  Lungs: clear. RR easy, unlabored   Abdomen: soft, nontender, nondistended, positive bowel sounds, bowel movement   Neg N/V/D   :  pt voiding without difficulty   Extremities:   MCKEON; edema, neg calf tenderness.   PPP bilaterally      PW:  Chest tubes:                 VITAL SIGNS    Telemetry:  rsr 60-90   Vital Signs Last 24 Hrs  T(C): 36.7 (22 @ 04:34), Max: 36.8 (22 @ 11:20)  T(F): 98.1 (22 @ 04:34), Max: 98.3 (22 @ 11:20)  HR: 86 (22 @ 06:11) (62 - 89)  BP: 135/80 (22 @ 04:34) (124/64 - 147/70)  RR: 18 (22 @ 04:34) (18 - 18)  SpO2: 93% (22 @ 06:11) (92% - 96%)             07:01  -   07:00  --------------------------------------------------------  IN: 910 mL / OUT: 2275 mL / NET: -1365 mL       Daily     Daily Weight in k.8 (06 May 2022 09:03)  Admit Wt: Drug Dosing Weight  Height (cm): 167.6 (04 May 2022 06:45)  Weight (kg): 95.3 (04 May 2022 06:45)  BMI (kg/m2): 33.9 (04 May 2022 06:45)  BSA (m2): 2.04 (04 May 2022 06:45)    Bilirubin Total, Serum: 0.5 mg/dL ( @ 06:32)    CAPILLARY BLOOD GLUCOSE      POCT Blood Glucose.: 116 mg/dL (07 May 2022 07:45)  POCT Blood Glucose.: 128 mg/dL (06 May 2022 21:09)  POCT Blood Glucose.: 149 mg/dL (06 May 2022 16:30)  POCT Blood Glucose.: 139 mg/dL (06 May 2022 11:29)          acetaminophen     Tablet .. 650 milliGRAM(s) Oral every 6 hours  acetaminophen     Tablet .. 650 milliGRAM(s) Oral every 6 hours PRN  amLODIPine   Tablet 10 milliGRAM(s) Oral daily  ascorbic acid 500 milliGRAM(s) Oral two times a day  aspirin enteric coated 81 milliGRAM(s) Oral daily  atorvastatin 80 milliGRAM(s) Oral at bedtime  bisacodyl Suppository 10 milliGRAM(s) Rectal once  chlorhexidine 0.12% Liquid 5 milliLiter(s) Oral Mucosa two times a day  chlorhexidine 2% Cloths 1 Application(s) Topical daily  enoxaparin Injectable 40 milliGRAM(s) SubCutaneous every 24 hours  gabapentin 100 milliGRAM(s) Oral every 8 hours  insulin lispro (ADMELOG) corrective regimen sliding scale   SubCutaneous Before meals and at bedtime  metoprolol succinate  milliGRAM(s) Oral daily  multivitamin 1 Tablet(s) Oral daily  oxyCODONE    IR 5 milliGRAM(s) Oral every 4 hours PRN  oxyCODONE    IR 10 milliGRAM(s) Oral every 4 hours PRN  pantoprazole    Tablet 40 milliGRAM(s) Oral before breakfast  polyethylene glycol 3350 17 Gram(s) Oral daily  senna 2 Tablet(s) Oral at bedtime  sodium chloride 0.9%. 1000 milliLiter(s) IV Continuous <Continuous>      PHYSICAL EXAM    Subjective: "I feel good."   Neurology: alert and oriented x 3, nonfocal, no gross deficits  CV : tele:  RSR 60-90    Sternal Wound :  CDI JC; pw d/c this am   Lungs: clear. RR easy, unlabored   Abdomen: soft, nontender, nondistended, positive bowel sounds, + bowel movement   Neg N/V/D; obese abdomen   :  pt voiding without difficulty   Extremities:   MCKEON;  +1 LE edema, neg calf tenderness.   PPP bilaterally      PW: + --> d/c this am   Chest tubes: none

## 2022-05-07 NOTE — PROGRESS NOTE ADULT - PROBLEM SELECTOR PLAN 1
continue postop care  continue asa and statin   increase norvasc 10 qd for htn  increase lop 25 mg po q6 for HR control  +pw- VVI 50  d/c meds/ yuliet/ jose/ sandy--> pt DTV  tx floor today  pulm toilet  pain management  increase activity as tolerated  Discharge planning- home when stable sun/mon continue postop care  continue asa and statin   norvasc 10 qd for htn  change lop to toprol 100 qd   initiate diuretics with edecrine 25 mg po qd ( hx sulfa allergy)   d/c pw as per Dr. Nisha taveras toilet  pain management  increase activity as tolerated  Discharge planning- home in am sun if stable overnight

## 2022-05-07 NOTE — PROGRESS NOTE ADULT - SUBJECTIVE AND OBJECTIVE BOX
[facility] - Na Amin, Hakan, Olga Lidia, Bridget, Radha Snow  Office Number:  [phone]    Patient resting comfortably in bed in NAD.  Laying flat with no respiratory distress.  No complaints of chest pain, dyspnea, palpitations, PND, or orthopnea.    F/U for:  AS    Telemetry:  SR    MEDICATIONS  (STANDING):  acetaminophen     Tablet .. 650 milliGRAM(s) Oral every 6 hours  amLODIPine   Tablet 10 milliGRAM(s) Oral daily  ascorbic acid 500 milliGRAM(s) Oral two times a day  aspirin enteric coated 81 milliGRAM(s) Oral daily  atorvastatin 80 milliGRAM(s) Oral at bedtime  bisacodyl Suppository 10 milliGRAM(s) Rectal once  chlorhexidine 0.12% Liquid 5 milliLiter(s) Oral Mucosa two times a day  chlorhexidine 2% Cloths 1 Application(s) Topical daily  enoxaparin Injectable 40 milliGRAM(s) SubCutaneous every 24 hours  ethacrynic acid 25 milliGRAM(s) Oral daily  gabapentin 100 milliGRAM(s) Oral every 8 hours  insulin lispro (ADMELOG) corrective regimen sliding scale   SubCutaneous Before meals and at bedtime  metoprolol succinate  milliGRAM(s) Oral daily  multivitamin 1 Tablet(s) Oral daily  pantoprazole    Tablet 40 milliGRAM(s) Oral before breakfast  polyethylene glycol 3350 17 Gram(s) Oral daily  senna 2 Tablet(s) Oral at bedtime  sodium chloride 0.9%. 1000 milliLiter(s) (10 mL/Hr) IV Continuous <Continuous>    MEDICATIONS  (PRN):  acetaminophen     Tablet .. 650 milliGRAM(s) Oral every 6 hours PRN Mild Pain (1 - 3)  oxyCODONE    IR 5 milliGRAM(s) Oral every 4 hours PRN Moderate Pain (4 - 6)  oxyCODONE    IR 10 milliGRAM(s) Oral every 4 hours PRN Severe Pain (7 - 10)      Allergies    hydrALAZINE (Rash)  penicillin (Urticaria; Rash)  pollen itchy eye (Eye Irritation)  sulfa drugs (Urticaria; Rash)    Intolerances        Vital Signs Last 24 Hrs  T(C): 36.6 (07 May 2022 10:56), Max: 36.8 (06 May 2022 18:46)  T(F): 97.9 (07 May 2022 10:56), Max: 98.2 (06 May 2022 18:46)  HR: 71 (07 May 2022 10:56) (62 - 89)  BP: 124/73 (07 May 2022 10:56) (124/64 - 135/80)  BP(mean): 92 (06 May 2022 14:44) (92 - 92)  RR: 18 (07 May 2022 10:56) (18 - 18)  SpO2: 94% (07 May 2022 10:56) (92% - 96%)    I&O's Summary    06 May 2022 07:01  -  07 May 2022 07:00  --------------------------------------------------------  IN: 910 mL / OUT: 2275 mL / NET: -1365 mL    07 May 2022 07:01  -  07 May 2022 11:44  --------------------------------------------------------  IN: 240 mL / OUT: 150 mL / NET: 90 mL        ON EXAM:    Constitutional: NAD, awake    HEENT: Moist Mucous Membranes, Anicteric  Pulmonary: Decreased breath sounds b/l. No rales, crackles or wheeze appreciated.   Cardiovascular: Regular, S1 and S2, No murmurs, rubs, gallops or clicks  Gastrointestinal: Bowel Sounds present, soft, nontender.   Lymph: Minimal peripheral edema. No lymphadenopathy.  Skin: No visible rashes or ulcers.  Psych:  Mood & affect appropriate for situation    LABS: All Labs Reviewed:                        11.5   19.61 )-----------( 127      ( 07 May 2022 06:32 )             36.4                         11.4   29.14 )-----------( 124      ( 06 May 2022 05:02 )             35.3                         11.2   15.71 )-----------( 102      ( 05 May 2022 00:12 )             35.0     07 May 2022 06:32    139    |  103    |  31     ----------------------------<  118    4.6     |  26     |  1.00   06 May 2022 05:01    139    |  106    |  30     ----------------------------<  132    4.9     |  24     |  0.97   05 May 2022 00:12    140    |  105    |  20     ----------------------------<  126    4.1     |  23     |  0.89     Ca    9.0        07 May 2022 06:32  Ca    9.1        06 May 2022 05:01  Ca    8.7        05 May 2022 00:12  Phos  3.1       06 May 2022 05:01  Phos  2.7       05 May 2022 00:12  Phos  3.1       04 May 2022 13:28  Mg     2.4       06 May 2022 05:01  Mg     2.0       05 May 2022 00:12  Mg     2.8       04 May 2022 13:28    TPro  6.7    /  Alb  3.7    /  TBili  0.5    /  DBili  x      /  AST  22     /  ALT  14     /  AlkPhos  49     07 May 2022 06:32  TPro  6.6    /  Alb  3.9    /  TBili  0.4    /  DBili  x      /  AST  25     /  ALT  13     /  AlkPhos  40     06 May 2022 05:01  TPro  6.2    /  Alb  4.0    /  TBili  0.5    /  DBili  x      /  AST  35     /  ALT  12     /  AlkPhos  35     05 May 2022 00:12

## 2022-05-08 LAB
ANION GAP SERPL CALC-SCNC: 13 MMOL/L — SIGNIFICANT CHANGE UP (ref 5–17)
BUN SERPL-MCNC: 21 MG/DL — SIGNIFICANT CHANGE UP (ref 7–23)
CALCIUM SERPL-MCNC: 9.3 MG/DL — SIGNIFICANT CHANGE UP (ref 8.4–10.5)
CHLORIDE SERPL-SCNC: 100 MMOL/L — SIGNIFICANT CHANGE UP (ref 96–108)
CO2 SERPL-SCNC: 25 MMOL/L — SIGNIFICANT CHANGE UP (ref 22–31)
CREAT SERPL-MCNC: 0.93 MG/DL — SIGNIFICANT CHANGE UP (ref 0.5–1.3)
EGFR: 90 ML/MIN/1.73M2 — SIGNIFICANT CHANGE UP
GLUCOSE BLDC GLUCOMTR-MCNC: 110 MG/DL — HIGH (ref 70–99)
GLUCOSE BLDC GLUCOMTR-MCNC: 122 MG/DL — HIGH (ref 70–99)
GLUCOSE BLDC GLUCOMTR-MCNC: 127 MG/DL — HIGH (ref 70–99)
GLUCOSE BLDC GLUCOMTR-MCNC: 139 MG/DL — HIGH (ref 70–99)
GLUCOSE SERPL-MCNC: 112 MG/DL — HIGH (ref 70–99)
HCT VFR BLD CALC: 36.8 % — LOW (ref 39–50)
HGB BLD-MCNC: 11.8 G/DL — LOW (ref 13–17)
MCHC RBC-ENTMCNC: 26.9 PG — LOW (ref 27–34)
MCHC RBC-ENTMCNC: 32.1 GM/DL — SIGNIFICANT CHANGE UP (ref 32–36)
MCV RBC AUTO: 84 FL — SIGNIFICANT CHANGE UP (ref 80–100)
NRBC # BLD: 0 /100 WBCS — SIGNIFICANT CHANGE UP (ref 0–0)
PLATELET # BLD AUTO: 156 K/UL — SIGNIFICANT CHANGE UP (ref 150–400)
POTASSIUM SERPL-MCNC: 4.4 MMOL/L — SIGNIFICANT CHANGE UP (ref 3.5–5.3)
POTASSIUM SERPL-SCNC: 4.4 MMOL/L — SIGNIFICANT CHANGE UP (ref 3.5–5.3)
RBC # BLD: 4.38 M/UL — SIGNIFICANT CHANGE UP (ref 4.2–5.8)
RBC # FLD: 14.3 % — SIGNIFICANT CHANGE UP (ref 10.3–14.5)
SODIUM SERPL-SCNC: 138 MMOL/L — SIGNIFICANT CHANGE UP (ref 135–145)
WBC # BLD: 16.1 K/UL — HIGH (ref 3.8–10.5)
WBC # FLD AUTO: 16.1 K/UL — HIGH (ref 3.8–10.5)

## 2022-05-08 PROCEDURE — 99232 SBSQ HOSP IP/OBS MODERATE 35: CPT

## 2022-05-08 RX ORDER — SPIRONOLACTONE 25 MG/1
25 TABLET, FILM COATED ORAL DAILY
Refills: 0 | Status: DISCONTINUED | OUTPATIENT
Start: 2022-05-08 | End: 2022-05-09

## 2022-05-08 RX ADMIN — GABAPENTIN 100 MILLIGRAM(S): 400 CAPSULE ORAL at 14:42

## 2022-05-08 RX ADMIN — GABAPENTIN 100 MILLIGRAM(S): 400 CAPSULE ORAL at 21:24

## 2022-05-08 RX ADMIN — ETHACRYNIC ACID 25 MILLIGRAM(S): 25 TABLET ORAL at 05:51

## 2022-05-08 RX ADMIN — POLYETHYLENE GLYCOL 3350 17 GRAM(S): 17 POWDER, FOR SOLUTION ORAL at 12:38

## 2022-05-08 RX ADMIN — CHLORHEXIDINE GLUCONATE 1 APPLICATION(S): 213 SOLUTION TOPICAL at 10:00

## 2022-05-08 RX ADMIN — PANTOPRAZOLE SODIUM 40 MILLIGRAM(S): 20 TABLET, DELAYED RELEASE ORAL at 05:51

## 2022-05-08 RX ADMIN — Medication 1 TABLET(S): at 12:38

## 2022-05-08 RX ADMIN — CHLORHEXIDINE GLUCONATE 5 MILLILITER(S): 213 SOLUTION TOPICAL at 05:51

## 2022-05-08 RX ADMIN — Medication 500 MILLIGRAM(S): at 05:52

## 2022-05-08 RX ADMIN — ENOXAPARIN SODIUM 40 MILLIGRAM(S): 100 INJECTION SUBCUTANEOUS at 12:38

## 2022-05-08 RX ADMIN — GABAPENTIN 100 MILLIGRAM(S): 400 CAPSULE ORAL at 05:52

## 2022-05-08 RX ADMIN — Medication 100 MILLIGRAM(S): at 05:51

## 2022-05-08 RX ADMIN — ATORVASTATIN CALCIUM 80 MILLIGRAM(S): 80 TABLET, FILM COATED ORAL at 21:25

## 2022-05-08 RX ADMIN — AMLODIPINE BESYLATE 10 MILLIGRAM(S): 2.5 TABLET ORAL at 05:52

## 2022-05-08 RX ADMIN — Medication 500 MILLIGRAM(S): at 18:10

## 2022-05-08 RX ADMIN — SENNA PLUS 2 TABLET(S): 8.6 TABLET ORAL at 21:24

## 2022-05-08 RX ADMIN — Medication 81 MILLIGRAM(S): at 12:38

## 2022-05-08 RX ADMIN — SPIRONOLACTONE 25 MILLIGRAM(S): 25 TABLET, FILM COATED ORAL at 09:58

## 2022-05-08 NOTE — PROGRESS NOTE ADULT - SUBJECTIVE AND OBJECTIVE BOX
VITAL SIGNS    Telemetry:  SR 70  Vital Signs Last 24 Hrs  T(C): 36.7 (22 @ 05:13), Max: 37.2 (22 @ 20:32)  T(F): 98 (22 @ 05:13), Max: 98.9 (22 @ 20:32)  HR: 78 (22 @ 10:10) (71 - 81)  BP: 145/77 (22 @ 05:13) (124/73 - 148/77)  RR: 18 (22 @ 05:13) (18 - 18)  SpO2: 98% (22 @ 10:10) (94% - 100%)             @ 07:01  -   @ 07:00  --------------------------------------------------------  IN: 880 mL / OUT: 2850 mL / NET: -1970 mL       Daily     Daily Weight in k (08 May 2022 08:00)  Admit Wt: Drug Dosing Weight  Height (cm): 167.6 (04 May 2022 06:45)  Weight (kg): 95.3 (04 May 2022 06:45)  BMI (kg/m2): 33.9 (04 May 2022 06:45)  BSA (m2): 2.04 (04 May 2022 06:45)      CAPILLARY BLOOD GLUCOSE      POCT Blood Glucose.: 127 mg/dL (08 May 2022 07:37)  POCT Blood Glucose.: 126 mg/dL (07 May 2022 21:33)  POCT Blood Glucose.: 115 mg/dL (07 May 2022 16:33)  POCT Blood Glucose.: 140 mg/dL (07 May 2022 11:25)          MEDICATIONS  acetaminophen     Tablet .. 650 milliGRAM(s) Oral every 6 hours PRN  amLODIPine   Tablet 10 milliGRAM(s) Oral daily  ascorbic acid 500 milliGRAM(s) Oral two times a day  aspirin enteric coated 81 milliGRAM(s) Oral daily  atorvastatin 80 milliGRAM(s) Oral at bedtime  bisacodyl Suppository 10 milliGRAM(s) Rectal once  chlorhexidine 0.12% Liquid 5 milliLiter(s) Oral Mucosa two times a day  chlorhexidine 2% Cloths 1 Application(s) Topical daily  enoxaparin Injectable 40 milliGRAM(s) SubCutaneous every 24 hours  ethacrynic acid 25 milliGRAM(s) Oral daily  gabapentin 100 milliGRAM(s) Oral every 8 hours  insulin lispro (ADMELOG) corrective regimen sliding scale   SubCutaneous Before meals and at bedtime  metoprolol succinate  milliGRAM(s) Oral daily  multivitamin 1 Tablet(s) Oral daily  oxyCODONE    IR 5 milliGRAM(s) Oral every 4 hours PRN  oxyCODONE    IR 10 milliGRAM(s) Oral every 4 hours PRN  pantoprazole    Tablet 40 milliGRAM(s) Oral before breakfast  polyethylene glycol 3350 17 Gram(s) Oral daily  senna 2 Tablet(s) Oral at bedtime  sodium chloride 0.9%. 1000 milliLiter(s) IV Continuous <Continuous>  spironolactone 25 milliGRAM(s) Oral daily      >>> <<<  PHYSICAL EXAM  Subjective: NAD  Neurology: alert and oriented x 3, nonfocal, no gross deficits  CV : s1s2  Sternal Wound :  CDI , Stable  Lungs:cta  < from: Xray Chest 1 View- PORTABLE-Routine (22 @ 06:32) >  dings/  Impression: Status post CABG. Cardiomegaly. Lungs are clear.    < end of copied text >  Abdomen: soft, NT,ND, (+ )BM  :  voiding  Extremities: trace edema b/l         LABS      138  |  100  |  21  ----------------------------<  112<H>  4.4   |  25  |  0.93    Ca    9.3      08 May 2022 06:52    TPro  6.7  /  Alb  3.7  /  TBili  0.5  /  DBili  x   /  AST  22  /  ALT  14  /  AlkPhos  49                                   11.8   16.10 )-----------( 156      ( 08 May 2022 06:52 )             36.8                 PAST MEDICAL & SURGICAL HISTORY:  Hypertension    Testosterone deficiency    H/O hyperlipidemia    Renal colic    Unilateral inguinal hernia with obstruction and without gangrene, recurrence not specified    Heart murmur    Obstructive sleep apnea    H/O aortic valve stenosis    Status post medial meniscus repair      Collapsed lung    H/O lithotripsy

## 2022-05-08 NOTE — PROGRESS NOTE ADULT - ASSESSMENT
Rosalio is a 66 year old male with WAQAS, HTN, HLD with WHALEY and found to have severe as/ai, and is now s/p bio avr on 5/4.    - doing well post-operatively, off pressors and inotropes  - hemodynamically stable this am  - cont asa and statin  - cont bb  - cont norvasc  - no sign of acute ischemia  - started on Edecrin and spironolactone  - remains in a sr on telemetry  - cath with mild non obs cad and ef 51% intraoperatively  - oxygen supplementation as needed  - trend creatinine and electrolytes. keep K>4, mg>2  - will follow with you postoperatively

## 2022-05-08 NOTE — PROGRESS NOTE ADULT - SUBJECTIVE AND OBJECTIVE BOX
[facility] - Na Amin, Hakan, Olga Lidia, Bridget, Radha Snow  Office Number:  [phone]    Patient resting comfortably in bed in NAD.  Laying flat with no respiratory distress.  No complaints of chest pain, dyspnea, palpitations, PND, or orthopnea.    F/U for:  AS    Telemetry: SR     MEDICATIONS  (STANDING):  amLODIPine   Tablet 10 milliGRAM(s) Oral daily  ascorbic acid 500 milliGRAM(s) Oral two times a day  aspirin enteric coated 81 milliGRAM(s) Oral daily  atorvastatin 80 milliGRAM(s) Oral at bedtime  bisacodyl Suppository 10 milliGRAM(s) Rectal once  chlorhexidine 0.12% Liquid 5 milliLiter(s) Oral Mucosa two times a day  chlorhexidine 2% Cloths 1 Application(s) Topical daily  enoxaparin Injectable 40 milliGRAM(s) SubCutaneous every 24 hours  ethacrynic acid 25 milliGRAM(s) Oral daily  gabapentin 100 milliGRAM(s) Oral every 8 hours  insulin lispro (ADMELOG) corrective regimen sliding scale   SubCutaneous Before meals and at bedtime  metoprolol succinate  milliGRAM(s) Oral daily  multivitamin 1 Tablet(s) Oral daily  pantoprazole    Tablet 40 milliGRAM(s) Oral before breakfast  polyethylene glycol 3350 17 Gram(s) Oral daily  senna 2 Tablet(s) Oral at bedtime  sodium chloride 0.9%. 1000 milliLiter(s) (10 mL/Hr) IV Continuous <Continuous>  spironolactone 25 milliGRAM(s) Oral daily    MEDICATIONS  (PRN):  acetaminophen     Tablet .. 650 milliGRAM(s) Oral every 6 hours PRN Mild Pain (1 - 3)  oxyCODONE    IR 5 milliGRAM(s) Oral every 4 hours PRN Moderate Pain (4 - 6)  oxyCODONE    IR 10 milliGRAM(s) Oral every 4 hours PRN Severe Pain (7 - 10)      Allergies    hydrALAZINE (Rash)  penicillin (Urticaria; Rash)  pollen itchy eye (Eye Irritation)  sulfa drugs (Urticaria; Rash)             Vital Signs Last 24 Hrs  T(C): 36.7 (08 May 2022 05:13), Max: 37.2 (07 May 2022 20:32)  T(F): 98 (08 May 2022 05:13), Max: 98.9 (07 May 2022 20:32)  HR: 78 (08 May 2022 10:10) (75 - 81)  BP: 145/77 (08 May 2022 05:13) (134/83 - 148/77)  BP(mean): 101 (07 May 2022 12:17) (101 - 101)  RR: 18 (08 May 2022 05:13) (18 - 18)  SpO2: 98% (08 May 2022 10:10) (94% - 100%)    I&O's Summary    07 May 2022 07:01  -  08 May 2022 07:00  --------------------------------------------------------  IN: 880 mL / OUT: 2850 mL / NET: -1970 mL    08 May 2022 07:01  -  08 May 2022 11:51  --------------------------------------------------------  IN: 300 mL / OUT: 0 mL / NET: 300 mL        ON EXAM:    Constitutional: NAD, awake    HEENT: Moist Mucous Membranes, Anicteric  Pulmonary: Decreased breath sounds b/l. No rales, crackles or wheeze appreciated.   Cardiovascular: Regular, S1 and S2, No murmurs, rubs, gallops or clicks  Gastrointestinal: Bowel Sounds present, soft, nontender.   Lymph: Minimal peripheral edema. No lymphadenopathy.  Skin: No visible rashes or ulcers.  Psych:  Mood & affect appropriate for situation    LABS: All Labs Reviewed:                        11.8   16.10 )-----------( 156      ( 08 May 2022 06:52 )             36.8                         11.5   19.61 )-----------( 127      ( 07 May 2022 06:32 )             36.4                         11.4   29.14 )-----------( 124      ( 06 May 2022 05:02 )             35.3     08 May 2022 06:52    138    |  100    |  21     ----------------------------<  112    4.4     |  25     |  0.93   07 May 2022 06:32    139    |  103    |  31     ----------------------------<  118    4.6     |  26     |  1.00   06 May 2022 05:01    139    |  106    |  30     ----------------------------<  132    4.9     |  24     |  0.97     Ca    9.3        08 May 2022 06:52  Ca    9.0        07 May 2022 06:32  Ca    9.1        06 May 2022 05:01  Phos  3.1       06 May 2022 05:01  Mg     2.4       06 May 2022 05:01    TPro  6.7    /  Alb  3.7    /  TBili  0.5    /  DBili  x      /  AST  22     /  ALT  14     /  AlkPhos  49     07 May 2022 06:32  TPro  6.6    /  Alb  3.9    /  TBili  0.4    /  DBili  x      /  AST  25     /  ALT  13     /  AlkPhos  40     06 May 2022 05:01

## 2022-05-08 NOTE — PROGRESS NOTE ADULT - ASSESSMENT
Patient is a 66M with PMH of WAQAS on nocturnal CPAP, HTN, HLD, AS, kidney stones, COVID infection Dec 2021 (not hospitalized) and pre DM.  Patient had progressive WHALEY since 2018, at which time he was diagnosed with AS and for approximately the last 6 months has had increasing WHALEY. Repeat ECHO was done 3/14/2022 revealing min MR, severe AS , mild AR, EF 75%.  Patient was referred to Dr Micah Cameron for AVR scheduled.  Patient is now s/p AVR on 5/4/2022 with Dr. Cameron.  No intra-op blood products, now tx from CTU to SDU.      5/4/2022- AVR-- Size 23mm Inspiris bioprosthetic aortic valve replacement, EF 50%, mild MR, no PVL.  No intra-op blood products given. V-wires.  Mediastinal chest tubesx2.  Extubated.     5/5/2022- On ASA 81mg, Lovenox 40mg QD. Lopressor 25mg Q8 started.  Norvasc 5mg started for hypertension. TX to [facility] SDU.  RIJ central line/R-radial yuliet in place-will maintain overnight re-assess in AM to de-intensify.  +Delgado draining clear yellow urine, maintain overnight.  V-Wires to box VVI 50/10 monitor pacing requirement - pt received hydralazine for HTN --> errythema noted on chest  5/6 VSS: rsr 60-70; increase lop 25 mg po q6 as per DR. Cameron and increase norvasc 10 qd for HTN; d/c rij/yulite/ delgado; pt DTV; meds d/c; tx floor; no further hydralazine secondary to allergy- medication added to EMR  5/7 VSS; RSR 60-90; lop d/c and changed to toprol 100 qd as per Dr. Cameron; pw d/c this am; + hypervolemia- initiate diuretics   5/8 Remains 12kg above preop weight. Aldactone initiated. Anticipate discharge in am  discharge planning- home in am sun if stable overnight

## 2022-05-08 NOTE — PROGRESS NOTE ADULT - PROBLEM SELECTOR PLAN 1
continue postop care  continue asa and statin   norvasc 10 qd for htn  change lop to toprol 100 qd   initiate diuretics with edecrine 25 mg po qd ( hx sulfa allergy) aldactone 25qd  d/c pw as per Dr. Nisha taveras toilet  pain management  increase activity as tolerated  Discharge planning- home in am sun if stable overnight

## 2022-05-09 ENCOUNTER — TRANSCRIPTION ENCOUNTER (OUTPATIENT)
Age: 67
End: 2022-05-09

## 2022-05-09 VITALS — HEART RATE: 78 BPM | OXYGEN SATURATION: 97 %

## 2022-05-09 PROBLEM — G47.33 OBSTRUCTIVE SLEEP APNEA (ADULT) (PEDIATRIC): Chronic | Status: ACTIVE | Noted: 2022-05-03

## 2022-05-09 LAB
ALBUMIN SERPL ELPH-MCNC: 3.3 G/DL — SIGNIFICANT CHANGE UP (ref 3.3–5)
ALP SERPL-CCNC: 49 U/L — SIGNIFICANT CHANGE UP (ref 40–120)
ALT FLD-CCNC: 16 U/L — SIGNIFICANT CHANGE UP (ref 10–45)
ANION GAP SERPL CALC-SCNC: 13 MMOL/L — SIGNIFICANT CHANGE UP (ref 5–17)
AST SERPL-CCNC: 16 U/L — SIGNIFICANT CHANGE UP (ref 10–40)
BASOPHILS # BLD AUTO: 0.01 K/UL — SIGNIFICANT CHANGE UP (ref 0–0.2)
BASOPHILS NFR BLD AUTO: 0.1 % — SIGNIFICANT CHANGE UP (ref 0–2)
BILIRUB SERPL-MCNC: 0.8 MG/DL — SIGNIFICANT CHANGE UP (ref 0.2–1.2)
BUN SERPL-MCNC: 23 MG/DL — SIGNIFICANT CHANGE UP (ref 7–23)
CALCIUM SERPL-MCNC: 9.3 MG/DL — SIGNIFICANT CHANGE UP (ref 8.4–10.5)
CHLORIDE SERPL-SCNC: 100 MMOL/L — SIGNIFICANT CHANGE UP (ref 96–108)
CO2 SERPL-SCNC: 26 MMOL/L — SIGNIFICANT CHANGE UP (ref 22–31)
CREAT SERPL-MCNC: 1.05 MG/DL — SIGNIFICANT CHANGE UP (ref 0.5–1.3)
EGFR: 78 ML/MIN/1.73M2 — SIGNIFICANT CHANGE UP
EOSINOPHIL # BLD AUTO: 0.22 K/UL — SIGNIFICANT CHANGE UP (ref 0–0.5)
EOSINOPHIL NFR BLD AUTO: 1.4 % — SIGNIFICANT CHANGE UP (ref 0–6)
GLUCOSE BLDC GLUCOMTR-MCNC: 114 MG/DL — HIGH (ref 70–99)
GLUCOSE SERPL-MCNC: 108 MG/DL — HIGH (ref 70–99)
HCT VFR BLD CALC: 39.3 % — SIGNIFICANT CHANGE UP (ref 39–50)
HGB BLD-MCNC: 12.6 G/DL — LOW (ref 13–17)
IMM GRANULOCYTES NFR BLD AUTO: 0.5 % — SIGNIFICANT CHANGE UP (ref 0–1.5)
LYMPHOCYTES # BLD AUTO: 19.8 % — SIGNIFICANT CHANGE UP (ref 13–44)
LYMPHOCYTES # BLD AUTO: 3.06 K/UL — SIGNIFICANT CHANGE UP (ref 1–3.3)
MCHC RBC-ENTMCNC: 26.8 PG — LOW (ref 27–34)
MCHC RBC-ENTMCNC: 32.1 GM/DL — SIGNIFICANT CHANGE UP (ref 32–36)
MCV RBC AUTO: 83.6 FL — SIGNIFICANT CHANGE UP (ref 80–100)
MONOCYTES # BLD AUTO: 1.1 K/UL — HIGH (ref 0–0.9)
MONOCYTES NFR BLD AUTO: 7.1 % — SIGNIFICANT CHANGE UP (ref 2–14)
NEUTROPHILS # BLD AUTO: 10.95 K/UL — HIGH (ref 1.8–7.4)
NEUTROPHILS NFR BLD AUTO: 71.1 % — SIGNIFICANT CHANGE UP (ref 43–77)
NRBC # BLD: 0 /100 WBCS — SIGNIFICANT CHANGE UP (ref 0–0)
PLATELET # BLD AUTO: 181 K/UL — SIGNIFICANT CHANGE UP (ref 150–400)
POTASSIUM SERPL-MCNC: 4.2 MMOL/L — SIGNIFICANT CHANGE UP (ref 3.5–5.3)
POTASSIUM SERPL-MCNC: 5 MMOL/L — SIGNIFICANT CHANGE UP (ref 3.5–5.3)
POTASSIUM SERPL-SCNC: 4.2 MMOL/L — SIGNIFICANT CHANGE UP (ref 3.5–5.3)
POTASSIUM SERPL-SCNC: 5 MMOL/L — SIGNIFICANT CHANGE UP (ref 3.5–5.3)
PROT SERPL-MCNC: 6.8 G/DL — SIGNIFICANT CHANGE UP (ref 6–8.3)
RBC # BLD: 4.7 M/UL — SIGNIFICANT CHANGE UP (ref 4.2–5.8)
RBC # FLD: 14.2 % — SIGNIFICANT CHANGE UP (ref 10.3–14.5)
SODIUM SERPL-SCNC: 139 MMOL/L — SIGNIFICANT CHANGE UP (ref 135–145)
WBC # BLD: 15.42 K/UL — HIGH (ref 3.8–10.5)
WBC # FLD AUTO: 15.42 K/UL — HIGH (ref 3.8–10.5)

## 2022-05-09 PROCEDURE — 36415 COLL VENOUS BLD VENIPUNCTURE: CPT

## 2022-05-09 PROCEDURE — 84132 ASSAY OF SERUM POTASSIUM: CPT

## 2022-05-09 PROCEDURE — 83880 ASSAY OF NATRIURETIC PEPTIDE: CPT

## 2022-05-09 PROCEDURE — 85014 HEMATOCRIT: CPT

## 2022-05-09 PROCEDURE — 93454 CORONARY ARTERY ANGIO S&I: CPT

## 2022-05-09 PROCEDURE — 82435 ASSAY OF BLOOD CHLORIDE: CPT

## 2022-05-09 PROCEDURE — 88305 TISSUE EXAM BY PATHOLOGIST: CPT

## 2022-05-09 PROCEDURE — 82803 BLOOD GASES ANY COMBINATION: CPT

## 2022-05-09 PROCEDURE — 85385 FIBRINOGEN ANTIGEN: CPT

## 2022-05-09 PROCEDURE — 84443 ASSAY THYROID STIM HORMONE: CPT

## 2022-05-09 PROCEDURE — 87641 MR-STAPH DNA AMP PROBE: CPT

## 2022-05-09 PROCEDURE — 84100 ASSAY OF PHOSPHORUS: CPT

## 2022-05-09 PROCEDURE — 80053 COMPREHEN METABOLIC PANEL: CPT

## 2022-05-09 PROCEDURE — 87640 STAPH A DNA AMP PROBE: CPT

## 2022-05-09 PROCEDURE — 93005 ELECTROCARDIOGRAM TRACING: CPT

## 2022-05-09 PROCEDURE — C1889: CPT

## 2022-05-09 PROCEDURE — 82962 GLUCOSE BLOOD TEST: CPT

## 2022-05-09 PROCEDURE — 99232 SBSQ HOSP IP/OBS MODERATE 35: CPT

## 2022-05-09 PROCEDURE — 85396 CLOTTING ASSAY WHOLE BLOOD: CPT

## 2022-05-09 PROCEDURE — 82565 ASSAY OF CREATININE: CPT

## 2022-05-09 PROCEDURE — C9399: CPT

## 2022-05-09 PROCEDURE — 84480 ASSAY TRIIODOTHYRONINE (T3): CPT

## 2022-05-09 PROCEDURE — 82550 ASSAY OF CK (CPK): CPT

## 2022-05-09 PROCEDURE — C1751: CPT

## 2022-05-09 PROCEDURE — 84436 ASSAY OF TOTAL THYROXINE: CPT

## 2022-05-09 PROCEDURE — 83735 ASSAY OF MAGNESIUM: CPT

## 2022-05-09 PROCEDURE — C1729: CPT

## 2022-05-09 PROCEDURE — 85384 FIBRINOGEN ACTIVITY: CPT

## 2022-05-09 PROCEDURE — 85025 COMPLETE CBC W/AUTO DIFF WBC: CPT

## 2022-05-09 PROCEDURE — 82330 ASSAY OF CALCIUM: CPT

## 2022-05-09 PROCEDURE — 84295 ASSAY OF SERUM SODIUM: CPT

## 2022-05-09 PROCEDURE — 85610 PROTHROMBIN TIME: CPT

## 2022-05-09 PROCEDURE — 86901 BLOOD TYPING SEROLOGIC RH(D): CPT

## 2022-05-09 PROCEDURE — 99152 MOD SED SAME PHYS/QHP 5/>YRS: CPT

## 2022-05-09 PROCEDURE — 85018 HEMOGLOBIN: CPT

## 2022-05-09 PROCEDURE — C1769: CPT

## 2022-05-09 PROCEDURE — 86891 AUTOLOGOUS BLOOD OP SALVAGE: CPT

## 2022-05-09 PROCEDURE — 84484 ASSAY OF TROPONIN QUANT: CPT

## 2022-05-09 PROCEDURE — 83605 ASSAY OF LACTIC ACID: CPT

## 2022-05-09 PROCEDURE — 94002 VENT MGMT INPAT INIT DAY: CPT

## 2022-05-09 PROCEDURE — 94660 CPAP INITIATION&MGMT: CPT

## 2022-05-09 PROCEDURE — C1894: CPT

## 2022-05-09 PROCEDURE — 86850 RBC ANTIBODY SCREEN: CPT

## 2022-05-09 PROCEDURE — 82947 ASSAY GLUCOSE BLOOD QUANT: CPT

## 2022-05-09 PROCEDURE — 86900 BLOOD TYPING SEROLOGIC ABO: CPT

## 2022-05-09 PROCEDURE — 81003 URINALYSIS AUTO W/O SCOPE: CPT

## 2022-05-09 PROCEDURE — 80061 LIPID PANEL: CPT

## 2022-05-09 PROCEDURE — P9047: CPT

## 2022-05-09 PROCEDURE — 80048 BASIC METABOLIC PNL TOTAL CA: CPT

## 2022-05-09 PROCEDURE — 86923 COMPATIBILITY TEST ELECTRIC: CPT

## 2022-05-09 PROCEDURE — C1887: CPT

## 2022-05-09 PROCEDURE — 83036 HEMOGLOBIN GLYCOSYLATED A1C: CPT

## 2022-05-09 PROCEDURE — 82553 CREATINE MB FRACTION: CPT

## 2022-05-09 PROCEDURE — 71045 X-RAY EXAM CHEST 1 VIEW: CPT

## 2022-05-09 PROCEDURE — 85027 COMPLETE CBC AUTOMATED: CPT

## 2022-05-09 PROCEDURE — U0003: CPT

## 2022-05-09 PROCEDURE — 85730 THROMBOPLASTIN TIME PARTIAL: CPT

## 2022-05-09 PROCEDURE — 97162 PT EVAL MOD COMPLEX 30 MIN: CPT

## 2022-05-09 PROCEDURE — P9045: CPT

## 2022-05-09 RX ORDER — OXYCODONE HYDROCHLORIDE 5 MG/1
1 TABLET ORAL
Qty: 30 | Refills: 0
Start: 2022-05-09 | End: 2022-05-13

## 2022-05-09 RX ORDER — ACETAMINOPHEN 500 MG
2 TABLET ORAL
Qty: 0 | Refills: 0 | DISCHARGE
Start: 2022-05-09

## 2022-05-09 RX ORDER — ASPIRIN/CALCIUM CARB/MAGNESIUM 324 MG
1 TABLET ORAL
Qty: 0 | Refills: 0 | DISCHARGE
Start: 2022-05-09

## 2022-05-09 RX ORDER — SENNA PLUS 8.6 MG/1
2 TABLET ORAL
Qty: 0 | Refills: 0 | DISCHARGE
Start: 2022-05-09

## 2022-05-09 RX ADMIN — Medication 1 TABLET(S): at 10:21

## 2022-05-09 RX ADMIN — SPIRONOLACTONE 25 MILLIGRAM(S): 25 TABLET, FILM COATED ORAL at 05:14

## 2022-05-09 RX ADMIN — Medication 500 MILLIGRAM(S): at 05:12

## 2022-05-09 RX ADMIN — AMLODIPINE BESYLATE 10 MILLIGRAM(S): 2.5 TABLET ORAL at 05:13

## 2022-05-09 RX ADMIN — PANTOPRAZOLE SODIUM 40 MILLIGRAM(S): 20 TABLET, DELAYED RELEASE ORAL at 05:13

## 2022-05-09 RX ADMIN — GABAPENTIN 100 MILLIGRAM(S): 400 CAPSULE ORAL at 05:13

## 2022-05-09 RX ADMIN — CHLORHEXIDINE GLUCONATE 5 MILLILITER(S): 213 SOLUTION TOPICAL at 05:13

## 2022-05-09 RX ADMIN — Medication 81 MILLIGRAM(S): at 10:06

## 2022-05-09 RX ADMIN — Medication 100 MILLIGRAM(S): at 05:13

## 2022-05-09 RX ADMIN — ETHACRYNIC ACID 25 MILLIGRAM(S): 25 TABLET ORAL at 05:14

## 2022-05-09 NOTE — DISCHARGE NOTE NURSING/CASE MANAGEMENT/SOCIAL WORK - NSDCPEFALRISK_GEN_ALL_CORE
For information on Fall & Injury Prevention, visit: [email or web address] OR  [email or web address] OR  https://www.cdc.gov/steadi/patient.html

## 2022-05-09 NOTE — DISCHARGE NOTE PROVIDER - HOSPITAL COURSE
5/4/2022- AVR-- Size 23mm Inspiris bioprosthetic aortic valve replacement, EF 50%, mild MR, no PVL.  No intra-op blood products given. V-wires.  Mediastinal chest tubesx2.  Extubated.     5/5/2022- On ASA 81mg, Lovenox 40mg QD. Lopressor 25mg Q8 started.  Norvasc 5mg started for hypertension. TX to 2[facility] SDU.  RIJ central line/R-radial yuliet in place-will maintain overnight re-assess in AM to de-intensify.  +Delgado draining clear yellow urine, maintain overnight.  V-Wires to box VVI 50/10 monitor pacing requirement - pt received hydralazine for HTN --> errythema noted on chest  5/6 VSS: rsr 60-70; increase lop 25 mg po q6 as per DR. Cameron and increase norvasc 10 qd for HTN; d/c rij/yuliet/ delgado; pt DTV; meds d/c; tx floor; no further hydralazine secondary to allergy- medication added to EMR  5/7 VSS; RSR 60-90; lop d/c and changed to toprol 100 qd as per Dr. Cameron; pw d/c this am; + hypervolemia- initiate diuretics   5/8 Remains 12kg above preop weight. Aldactone initiated. Anticipate discharge in am  discharge planning- home in am sun if stable overnight

## 2022-05-09 NOTE — DISCHARGE NOTE PROVIDER - NSDCCPCAREPLAN_GEN_ALL_CORE_FT
PRINCIPAL DISCHARGE DIAGNOSIS  Diagnosis: Aortic insufficiency  Assessment and Plan of Treatment: sp   AVR

## 2022-05-09 NOTE — DISCHARGE NOTE PROVIDER - NSDCFUADDAPPT_GEN_ALL_CORE_FT
see  Dr Cameron see  Dr Cameron  may 18th   130 pm   follow up with your cardiologist and PCP 2 weeks see  Dr Cameron  may 19th   215 pm   follow up with your cardiologist and PCP 2 weeks

## 2022-05-09 NOTE — PROGRESS NOTE ADULT - SUBJECTIVE AND OBJECTIVE BOX
[facility] -- Na Amin, Hakan, Olga Lidia, Edy Gill Savella  Office # [phone]      Follow Up:  AVR    Subjective/Observations: Patient seen and examined. Events noted. Resting comfortably in bed. No complaints of chest pain, dyspnea, or palpitations reported. No signs of orthopnea or PND.       REVIEW OF SYSTEMS: All other review of systems is negative unless indicated above    PAST MEDICAL & SURGICAL HISTORY:  Hypertension    Testosterone deficiency    H/O hyperlipidemia    Renal colic    Unilateral inguinal hernia with obstruction and without gangrene, recurrence not specified    Heart murmur    Obstructive sleep apnea    H/O aortic valve stenosis    Status post medial meniscus repair  2015    Collapsed lung    H/O lithotripsy        MEDICATIONS  (STANDING):  amLODIPine   Tablet 10 milliGRAM(s) Oral daily  aspirin enteric coated 81 milliGRAM(s) Oral daily  atorvastatin 80 milliGRAM(s) Oral at bedtime  bisacodyl Suppository 10 milliGRAM(s) Rectal once  chlorhexidine 2% Cloths 1 Application(s) Topical daily  enoxaparin Injectable 40 milliGRAM(s) SubCutaneous every 24 hours  ethacrynic acid 25 milliGRAM(s) Oral daily  insulin lispro (ADMELOG) corrective regimen sliding scale   SubCutaneous Before meals and at bedtime  metoprolol succinate  milliGRAM(s) Oral daily  multivitamin 1 Tablet(s) Oral daily  pantoprazole    Tablet 40 milliGRAM(s) Oral before breakfast  polyethylene glycol 3350 17 Gram(s) Oral daily  senna 2 Tablet(s) Oral at bedtime  sodium chloride 0.9%. 1000 milliLiter(s) (10 mL/Hr) IV Continuous <Continuous>    MEDICATIONS  (PRN):  acetaminophen     Tablet .. 650 milliGRAM(s) Oral every 6 hours PRN Mild Pain (1 - 3)  oxyCODONE    IR 5 milliGRAM(s) Oral every 4 hours PRN Moderate Pain (4 - 6)  oxyCODONE    IR 10 milliGRAM(s) Oral every 4 hours PRN Severe Pain (7 - 10)      Allergies    hydrALAZINE (Rash)  penicillin (Urticaria; Rash)  pollen itchy eye (Eye Irritation)  sulfa drugs (Urticaria; Rash)    Intolerances            Vital Signs Last 24 Hrs  T(C): 37.1 (09 May 2022 04:00), Max: 37.1 (09 May 2022 04:00)  T(F): 98.7 (09 May 2022 04:00), Max: 98.7 (09 May 2022 04:00)  HR: 82 (09 May 2022 05:51) (78 - 783)  BP: 126/76 (09 May 2022 04:00) (126/76 - 127/82)  BP(mean): --  RR: 18 (09 May 2022 04:00) (18 - 18)  SpO2: 98% (09 May 2022 05:51) (96% - 98%)    I&O's Summary    08 May 2022 07:01  -  09 May 2022 07:00  --------------------------------------------------------  IN: 1095 mL / OUT: 825 mL / NET: 270 mL    09 May 2022 07:01  -  09 May 2022 09:03  --------------------------------------------------------  IN: 120 mL / OUT: 500 mL / NET: -380 mL          PHYSICAL EXAM:  TELE:   Constitutional: NAD, awake and alert, well-developed  HEENT: Moist Mucous Membranes, Anicteric  Pulmonary: Non-labored, breath sounds are clear bilaterally, No wheezing, rales or rhonchi  Cardiovascular: Regular, S1 and S2, No murmurs, rubs, gallops or clicks  Gastrointestinal: Bowel Sounds present, soft, nontender.   Lymph: + peripheral edema. No lymphadenopathy.  Skin: No visible rashes or ulcers.  Psych:  Mood & affect appropriate for situation    LABS: All Labs Reviewed:                        12.6   15.42 )-----------( 181      ( 09 May 2022 05:04 )             39.3                         11.8   16.10 )-----------( 156      ( 08 May 2022 06:52 )             36.8                         11.5   19.61 )-----------( 127      ( 07 May 2022 06:32 )             36.4     09 May 2022 05:03    139    |  100    |  23     ----------------------------<  108    5.0     |  26     |  1.05   08 May 2022 06:52    138    |  100    |  21     ----------------------------<  112    4.4     |  25     |  0.93   07 May 2022 06:32    139    |  103    |  31     ----------------------------<  118    4.6     |  26     |  1.00     Ca    9.3        09 May 2022 05:03  Ca    9.3        08 May 2022 06:52  Ca    9.0        07 May 2022 06:32    TPro  6.8    /  Alb  3.3    /  TBili  0.8    /  DBili  x      /  AST  16     /  ALT  16     /  AlkPhos  49     09 May 2022 05:03  TPro  6.7    /  Alb  3.7    /  TBili  0.5    /  DBili  x      /  AST  22     /  ALT  14     /  AlkPhos  49     07 May 2022 06:32

## 2022-05-09 NOTE — DISCHARGE NOTE PROVIDER - CARE PROVIDER_API CALL
Micah Cameron)  Surgery; Thoracic and Cardiac Surgery  [address] [place] [address]  Phone: [phone]  Fax: [phone]  Follow Up Time:

## 2022-05-09 NOTE — PROGRESS NOTE ADULT - ASSESSMENT
Rosalio is a 66 year old male with WAQAS, HTN, HLD with WHALEY and found to have severe as/ai, and is now s/p bio avr on 5/4.    - doing well post-operatively, off pressors and inotropes  - hemodynamically stable this am  - cont asa and statin  - cont bb  - cont norvasc  - no sign of acute ischemia  - started on Edecrin for lower ext edema  - off aldactone today likely from high normal K  - remains in a sr on telemetry  - cath with mild non obs cad and ef 51% intraoperatively  - oxygen supplementation as needed  - trend creatinine and electrolytes. keep K>4, mg>2  - will follow with you postoperatively  DC planning per primary team.

## 2022-05-09 NOTE — DISCHARGE NOTE NURSING/CASE MANAGEMENT/SOCIAL WORK - PATIENT PORTAL LINK FT
You can access the FollowMyHealth Patient Portal offered by [facility] by registering at the following website: [email or web address]. By joining [facility]’s FollowMyHealth portal, you will also be able to view your health information using other applications (apps) compatible with our system.

## 2022-05-09 NOTE — DISCHARGE NOTE PROVIDER - NSDCMRMEDTOKEN_GEN_ALL_CORE_FT
Crestor 20 mg oral tablet: 1 tab(s) orally once a day (at bedtime)  lisinopril 40 mg oral tablet: 1 tab(s) orally once a day  multivitamin:   once a day   acetaminophen: 2 tab(s) orally every 6 hours, As Needed  aspirin 81 mg oral delayed release tablet: 1 tab(s) orally once a day  Crestor 20 mg oral tablet: 1 tab(s) orally once a day (at bedtime)  lisinopril 40 mg oral tablet: 1 tab(s) orally once a day  multivitamin:   once a day  senna oral tablet: 2 tab(s) orally once a day (at bedtime)   acetaminophen: 2 tab(s) orally every 6 hours, As Needed  aspirin 81 mg oral delayed release tablet: 1 tab(s) orally once a day  Crestor 20 mg oral tablet: 1 tab(s) orally once a day (at bedtime)  ethacrynic acid 25 mg oral tablet: 1 tab(s) orally once a day  lisinopril 40 mg oral tablet: 1 tab(s) orally once a day  metoprolol succinate 100 mg oral tablet, extended release: 1 tab(s) orally once a day  multivitamin:   once a day  oxyCODONE 5 mg oral tablet: 1 tab(s) orally every 4 hours, As needed, Moderate Pain (4 - 6) MDD:62 tabs for severe pain  Potassium Chloride (Eqv-Klor-Con 10) 10 mEq oral tablet, extended release: 1 tab(s) orally once a day   senna oral tablet: 2 tab(s) orally once a day (at bedtime)

## 2022-05-09 NOTE — PROGRESS NOTE ADULT - PROVIDER SPECIALTY LIST ADULT
Critical Care
CT Surgery
CT Surgery
Cardiology
Critical Care
CT Surgery
CT Surgery
Cardiology
CT Surgery

## 2022-05-10 ENCOUNTER — NON-APPOINTMENT (OUTPATIENT)
Age: 67
End: 2022-05-10

## 2022-05-10 ENCOUNTER — APPOINTMENT (OUTPATIENT)
Dept: INTERNAL MEDICINE | Facility: CLINIC | Age: 67
End: 2022-05-10

## 2022-05-10 LAB — FIBRINOGEN AG PPP IA-MCNC: 384 MG/DL — HIGH (ref 180–350)

## 2022-05-10 RX ORDER — ETHACRYNIC ACID 25 MG/1
1 TABLET ORAL
Qty: 5 | Refills: 0
Start: 2022-05-10 | End: 2022-05-14

## 2022-05-10 RX ORDER — ACETAMINOPHEN 325 MG/1
325 TABLET ORAL EVERY 6 HOURS
Refills: 0 | Status: ACTIVE | COMMUNITY
Start: 2022-05-10

## 2022-05-10 RX ORDER — POTASSIUM CHLORIDE 20 MEQ
1 PACKET (EA) ORAL
Qty: 5 | Refills: 0
Start: 2022-05-10 | End: 2022-05-14

## 2022-05-10 RX ORDER — METOPROLOL TARTRATE 50 MG
1 TABLET ORAL
Qty: 30 | Refills: 0
Start: 2022-05-10 | End: 2022-06-08

## 2022-05-11 ENCOUNTER — NON-APPOINTMENT (OUTPATIENT)
Age: 67
End: 2022-05-11

## 2022-05-11 LAB — SURGICAL PATHOLOGY STUDY: SIGNIFICANT CHANGE UP

## 2022-05-12 ENCOUNTER — APPOINTMENT (OUTPATIENT)
Dept: CARE COORDINATION | Facility: HOME HEALTH | Age: 67
End: 2022-05-12
Payer: MEDICARE

## 2022-05-12 VITALS
RESPIRATION RATE: 12 BRPM | HEART RATE: 74 BPM | SYSTOLIC BLOOD PRESSURE: 116 MMHG | BODY MASS INDEX: 32.44 KG/M2 | DIASTOLIC BLOOD PRESSURE: 72 MMHG | OXYGEN SATURATION: 98 % | WEIGHT: 201 LBS

## 2022-05-12 PROCEDURE — 99024 POSTOP FOLLOW-UP VISIT: CPT

## 2022-05-12 NOTE — REASON FOR VISIT
[Post Hospitalization] : a post hospitalization visit [Spouse] : spouse [FreeTextEntry1] : FOLLOW YOUR HEART - Transitional Care Management Program - [facility]

## 2022-05-12 NOTE — HISTORY OF PRESENT ILLNESS
[FreeTextEntry1] : 66 year old male with no implantable cardiac devices and PMH of WAQAS -uses CPAP, htn, hld, AS, kidney stones, COVID infection Dec 2021 (not hospitalized) and pre DM. Pt reports progressive WHALEY since 2018, at which time he was diagnosed with AS. Over the past 6 months approx he feels increasing WHALEY- even when walking the dog. Denies CP, dizziness or syncope. Repeat ECHO was done 3/14/2022 revealing min MR, severe AS , mild AR, EF 75%. On 5/4/2022 he underwent AVR (Size 23mm Inspiris bioprosthetic aortic valve replacement), EF 50%, mild MR, no PVL. Hydralazine allergy now noted & DC’d. On 5/9/2022 the pt was DC’d home. On 5/12, today, he is seen at home recovering well overall. Emotional support and education provided. All questions answered.\par

## 2022-05-12 NOTE — PHYSICAL EXAM
[Sclera] : the sclera and conjunctiva were normal [Neck Appearance] : the appearance of the neck was normal [] : no respiratory distress [Respiration, Rhythm And Depth] : normal respiratory rhythm and effort [Exaggerated Use Of Accessory Muscles For Inspiration] : no accessory muscle use [Auscultation Breath Sounds / Voice Sounds] : lungs were clear to auscultation bilaterally [Apical Impulse] : the apical impulse was normal [Heart Rate And Rhythm] : heart rate was normal and rhythm regular [Heart Sounds] : normal S1 and S2 [Heart Sounds Gallop] : no gallops [Murmurs] : no murmurs [Heart Sounds Pericardial Friction Rub] : no pericardial rub [Examination Of The Chest] : the chest was normal in appearance [Chest Visual Inspection Thoracic Asymmetry] : no chest asymmetry [Diminished Respiratory Excursion] : normal chest expansion [Bowel Sounds] : normal bowel sounds [Abdomen Soft] : soft [Abdomen Tenderness] : non-tender [Abnormal Walk] : normal gait [Skin Color & Pigmentation] : normal skin color and pigmentation [FreeTextEntry1] : Small swollen bump noted at left arm AC site. No redness, drainage, or warmth noted.  [Sensation] : the sensory exam was normal to light touch and pinprick [Motor Exam] : the motor exam was normal [No Focal Deficits] : no focal deficits [Oriented To Time, Place, And Person] : oriented to person, place, and time [Impaired Insight] : insight and judgment were intact [Affect] : the affect was normal [Mood] : the mood was normal

## 2022-05-12 NOTE — ASSESSMENT
[FreeTextEntry1] : This is a 65 y/o M seen at home s/p AVR recovering overall well. He has good support from his wife. Pain is well managed w/ Tylenol. He is not needing to use Oxycodone at  this time. He is walking up & down stairs well, as seen today. 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 He is taking all his meds as prescribed. He noted a swollen bump at his Left arm AC site from where an IV was. Upon my exam, a small swollen bump noted at left arm AC site. No redness, drainage, or warmth noted. Advised to monitor site for signs of infection and use a hot compress to reduce swelling. No other concerns noted or voiced upon my exam.

## 2022-05-16 PROBLEM — Z09 POSTOPERATIVE FOLLOW-UP: Status: ACTIVE | Noted: 2022-05-16

## 2022-05-18 ENCOUNTER — APPOINTMENT (OUTPATIENT)
Dept: CARDIOTHORACIC SURGERY | Facility: CLINIC | Age: 67
End: 2022-05-18
Payer: MEDICARE

## 2022-05-18 VITALS
HEART RATE: 63 BPM | DIASTOLIC BLOOD PRESSURE: 82 MMHG | TEMPERATURE: 97.5 F | SYSTOLIC BLOOD PRESSURE: 145 MMHG | OXYGEN SATURATION: 99 % | BODY MASS INDEX: 31.5 KG/M2 | RESPIRATION RATE: 16 BRPM | HEIGHT: 66 IN | WEIGHT: 196 LBS

## 2022-05-18 DIAGNOSIS — Z09 ENCOUNTER FOR FOLLOW-UP EXAMINATION AFTER COMPLETED TREATMENT FOR CONDITIONS OTHER THAN MALIGNANT NEOPLASM: ICD-10-CM

## 2022-05-18 PROCEDURE — 99024 POSTOP FOLLOW-UP VISIT: CPT

## 2022-05-18 RX ORDER — ETHACRYNIC ACID 25 MG/1
25 TABLET ORAL DAILY
Refills: 0 | Status: COMPLETED | COMMUNITY
Start: 2022-05-10 | End: 2022-05-18

## 2022-05-18 RX ORDER — POTASSIUM CHLORIDE 750 MG/1
10 TABLET, FILM COATED, EXTENDED RELEASE ORAL DAILY
Refills: 0 | Status: COMPLETED | COMMUNITY
Start: 2022-05-10 | End: 2022-05-18

## 2022-05-18 RX ORDER — OXYCODONE 5 MG/1
5 TABLET ORAL EVERY 4 HOURS
Refills: 0 | Status: COMPLETED | COMMUNITY
Start: 2022-05-10 | End: 2022-05-18

## 2022-05-18 RX ORDER — OXYCODONE 5 MG/1
5 TABLET ORAL
Qty: 45 | Refills: 0 | Status: COMPLETED | COMMUNITY
Start: 2022-05-10 | End: 2022-05-18

## 2022-05-24 ENCOUNTER — APPOINTMENT (OUTPATIENT)
Dept: CARDIOLOGY | Facility: CLINIC | Age: 67
End: 2022-05-24
Payer: MEDICARE

## 2022-05-24 ENCOUNTER — NON-APPOINTMENT (OUTPATIENT)
Age: 67
End: 2022-05-24

## 2022-05-24 VITALS
HEIGHT: 66 IN | SYSTOLIC BLOOD PRESSURE: 139 MMHG | OXYGEN SATURATION: 99 % | DIASTOLIC BLOOD PRESSURE: 81 MMHG | HEART RATE: 59 BPM | BODY MASS INDEX: 31.5 KG/M2 | WEIGHT: 196 LBS

## 2022-05-24 PROCEDURE — 99215 OFFICE O/P EST HI 40 MIN: CPT

## 2022-05-24 PROCEDURE — 93000 ELECTROCARDIOGRAM COMPLETE: CPT

## 2022-06-01 ENCOUNTER — APPOINTMENT (OUTPATIENT)
Dept: INTERNAL MEDICINE | Facility: CLINIC | Age: 67
End: 2022-06-01
Payer: MEDICARE

## 2022-06-01 VITALS
BODY MASS INDEX: 33.09 KG/M2 | SYSTOLIC BLOOD PRESSURE: 149 MMHG | WEIGHT: 205 LBS | HEART RATE: 63 BPM | OXYGEN SATURATION: 96 % | DIASTOLIC BLOOD PRESSURE: 81 MMHG

## 2022-06-01 VITALS — DIASTOLIC BLOOD PRESSURE: 80 MMHG | SYSTOLIC BLOOD PRESSURE: 140 MMHG

## 2022-06-01 PROCEDURE — 99214 OFFICE O/P EST MOD 30 MIN: CPT

## 2022-06-01 NOTE — PHYSICAL EXAM
[No Acute Distress] : no acute distress [Normal Sclera/Conjunctiva] : normal sclera/conjunctiva [No Lymphadenopathy] : no lymphadenopathy [No Edema] : there was no peripheral edema [Normal] : soft, non-tender, non-distended, no masses palpated, no HSM and normal bowel sounds [de-identified] : Chest surgical wound healing well

## 2022-06-01 NOTE — HISTORY OF PRESENT ILLNESS
[FreeTextEntry1] : Hospital follow up [de-identified] : Patient with history of hypertension, hyperlipidemia, nonobstructive coronary artery disease, status post bioprosthetic bovine aortic valve replacement returns to office for follow up\par He feels well and denies any shortness of breath, palpitation.\par While in the hospital metoprolol was added for blood pressure control

## 2022-06-08 ENCOUNTER — TRANSCRIPTION ENCOUNTER (OUTPATIENT)
Age: 67
End: 2022-06-08

## 2022-06-21 ENCOUNTER — EMERGENCY (EMERGENCY)
Facility: HOSPITAL | Age: 67
LOS: 1 days | Discharge: HOME | End: 2022-06-21
Attending: EMERGENCY MEDICINE | Admitting: EMERGENCY MEDICINE
Payer: MEDICARE

## 2022-06-21 VITALS
RESPIRATION RATE: 18 BRPM | DIASTOLIC BLOOD PRESSURE: 89 MMHG | HEART RATE: 60 BPM | TEMPERATURE: 98 F | HEIGHT: 66 IN | OXYGEN SATURATION: 97 % | SYSTOLIC BLOOD PRESSURE: 199 MMHG | WEIGHT: 203.05 LBS

## 2022-06-21 DIAGNOSIS — J98.19 OTHER PULMONARY COLLAPSE: Chronic | ICD-10-CM

## 2022-06-21 DIAGNOSIS — Z98.89 OTHER SPECIFIED POSTPROCEDURAL STATES: Chronic | ICD-10-CM

## 2022-06-21 DIAGNOSIS — Z98.890 OTHER SPECIFIED POSTPROCEDURAL STATES: Chronic | ICD-10-CM

## 2022-06-21 LAB
ALBUMIN SERPL ELPH-MCNC: 3.3 G/DL — SIGNIFICANT CHANGE UP (ref 3.3–5)
ALP SERPL-CCNC: 60 U/L — SIGNIFICANT CHANGE UP (ref 40–120)
ALT FLD-CCNC: 19 U/L — SIGNIFICANT CHANGE UP (ref 12–78)
ANION GAP SERPL CALC-SCNC: 5 MMOL/L — SIGNIFICANT CHANGE UP (ref 5–17)
AST SERPL-CCNC: 15 U/L — SIGNIFICANT CHANGE UP (ref 15–37)
BASOPHILS # BLD AUTO: 0.04 K/UL — SIGNIFICANT CHANGE UP (ref 0–0.2)
BASOPHILS NFR BLD AUTO: 0.3 % — SIGNIFICANT CHANGE UP (ref 0–2)
BILIRUB SERPL-MCNC: 0.3 MG/DL — SIGNIFICANT CHANGE UP (ref 0.2–1.2)
BUN SERPL-MCNC: 17 MG/DL — SIGNIFICANT CHANGE UP (ref 7–23)
CALCIUM SERPL-MCNC: 8.8 MG/DL — SIGNIFICANT CHANGE UP (ref 8.5–10.1)
CHLORIDE SERPL-SCNC: 110 MMOL/L — HIGH (ref 96–108)
CO2 SERPL-SCNC: 28 MMOL/L — SIGNIFICANT CHANGE UP (ref 22–31)
CREAT SERPL-MCNC: 0.93 MG/DL — SIGNIFICANT CHANGE UP (ref 0.5–1.3)
EGFR: 89 ML/MIN/1.73M2 — SIGNIFICANT CHANGE UP
EOSINOPHIL # BLD AUTO: 0.42 K/UL — SIGNIFICANT CHANGE UP (ref 0–0.5)
EOSINOPHIL NFR BLD AUTO: 3.2 % — SIGNIFICANT CHANGE UP (ref 0–6)
GLUCOSE SERPL-MCNC: 119 MG/DL — HIGH (ref 70–99)
HCT VFR BLD CALC: 40.6 % — SIGNIFICANT CHANGE UP (ref 39–50)
HGB BLD-MCNC: 12.5 G/DL — LOW (ref 13–17)
IMM GRANULOCYTES NFR BLD AUTO: 0.3 % — SIGNIFICANT CHANGE UP (ref 0–1.5)
INR BLD: 1.06 RATIO — SIGNIFICANT CHANGE UP (ref 0.88–1.16)
LYMPHOCYTES # BLD AUTO: 16.2 % — SIGNIFICANT CHANGE UP (ref 13–44)
LYMPHOCYTES # BLD AUTO: 2.15 K/UL — SIGNIFICANT CHANGE UP (ref 1–3.3)
MAGNESIUM SERPL-MCNC: 2.2 MG/DL — SIGNIFICANT CHANGE UP (ref 1.6–2.6)
MCHC RBC-ENTMCNC: 26.1 PG — LOW (ref 27–34)
MCHC RBC-ENTMCNC: 30.8 GM/DL — LOW (ref 32–36)
MCV RBC AUTO: 84.8 FL — SIGNIFICANT CHANGE UP (ref 80–100)
MONOCYTES # BLD AUTO: 0.65 K/UL — SIGNIFICANT CHANGE UP (ref 0–0.9)
MONOCYTES NFR BLD AUTO: 4.9 % — SIGNIFICANT CHANGE UP (ref 2–14)
NEUTROPHILS # BLD AUTO: 9.97 K/UL — HIGH (ref 1.8–7.4)
NEUTROPHILS NFR BLD AUTO: 75.1 % — SIGNIFICANT CHANGE UP (ref 43–77)
NRBC # BLD: 0 /100 WBCS — SIGNIFICANT CHANGE UP (ref 0–0)
PHOSPHATE SERPL-MCNC: 3.6 MG/DL — SIGNIFICANT CHANGE UP (ref 2.5–4.5)
PLATELET # BLD AUTO: 224 K/UL — SIGNIFICANT CHANGE UP (ref 150–400)
POTASSIUM SERPL-MCNC: 3.7 MMOL/L — SIGNIFICANT CHANGE UP (ref 3.5–5.3)
POTASSIUM SERPL-SCNC: 3.7 MMOL/L — SIGNIFICANT CHANGE UP (ref 3.5–5.3)
PROT SERPL-MCNC: 7.4 G/DL — SIGNIFICANT CHANGE UP (ref 6–8.3)
PROTHROM AB SERPL-ACNC: 12.4 SEC — SIGNIFICANT CHANGE UP (ref 10.5–13.4)
RBC # BLD: 4.79 M/UL — SIGNIFICANT CHANGE UP (ref 4.2–5.8)
RBC # FLD: 14.6 % — HIGH (ref 10.3–14.5)
SARS-COV-2 RNA SPEC QL NAA+PROBE: SIGNIFICANT CHANGE UP
SODIUM SERPL-SCNC: 143 MMOL/L — SIGNIFICANT CHANGE UP (ref 135–145)
TROPONIN I, HIGH SENSITIVITY RESULT: 6.6 NG/L — SIGNIFICANT CHANGE UP
WBC # BLD: 13.27 K/UL — HIGH (ref 3.8–10.5)
WBC # FLD AUTO: 13.27 K/UL — HIGH (ref 3.8–10.5)

## 2022-06-21 PROCEDURE — 99284 EMERGENCY DEPT VISIT MOD MDM: CPT

## 2022-06-21 PROCEDURE — 71046 X-RAY EXAM CHEST 2 VIEWS: CPT | Mod: 26

## 2022-06-21 PROCEDURE — 70450 CT HEAD/BRAIN W/O DYE: CPT | Mod: 26,MA

## 2022-06-21 PROCEDURE — 93010 ELECTROCARDIOGRAM REPORT: CPT

## 2022-06-21 RX ORDER — ACETAMINOPHEN 500 MG
1000 TABLET ORAL ONCE
Refills: 0 | Status: COMPLETED | OUTPATIENT
Start: 2022-06-21 | End: 2022-06-21

## 2022-06-21 RX ORDER — KETOROLAC TROMETHAMINE 30 MG/ML
30 SYRINGE (ML) INJECTION ONCE
Refills: 0 | Status: DISCONTINUED | OUTPATIENT
Start: 2022-06-21 | End: 2022-06-21

## 2022-06-21 RX ORDER — LABETALOL HCL 100 MG
10 TABLET ORAL ONCE
Refills: 0 | Status: COMPLETED | OUTPATIENT
Start: 2022-06-21 | End: 2022-06-21

## 2022-06-21 RX ORDER — LABETALOL HCL 100 MG
20 TABLET ORAL ONCE
Refills: 0 | Status: COMPLETED | OUTPATIENT
Start: 2022-06-21 | End: 2022-06-21

## 2022-06-21 RX ORDER — LABETALOL HCL 100 MG
100 TABLET ORAL ONCE
Refills: 0 | Status: COMPLETED | OUTPATIENT
Start: 2022-06-21 | End: 2022-06-21

## 2022-06-21 RX ADMIN — Medication 1000 MILLIGRAM(S): at 18:57

## 2022-06-21 RX ADMIN — Medication 30 MILLIGRAM(S): at 22:05

## 2022-06-21 RX ADMIN — Medication 20 MILLIGRAM(S): at 18:00

## 2022-06-21 RX ADMIN — Medication 30 MILLIGRAM(S): at 22:20

## 2022-06-21 RX ADMIN — Medication 10 MILLIGRAM(S): at 19:17

## 2022-06-21 RX ADMIN — Medication 400 MILLIGRAM(S): at 18:38

## 2022-06-21 RX ADMIN — Medication 100 MILLIGRAM(S): at 20:32

## 2022-06-21 RX ADMIN — Medication 0.1 MILLIGRAM(S): at 20:32

## 2022-06-21 NOTE — ED ADULT TRIAGE NOTE - CHIEF COMPLAINT QUOTE
Patient is a 66yo male complaining of headache and nausea and vomiting 1 episode. Patient complaining of postnasal drip Patient states the pain is on the top of the head and is not like any other headache he ever had. Patient describes as worst headache ever had. Patient denies trauma. Patient is on ASA daily. Denies blurred vision or aura or photosensitive

## 2022-06-21 NOTE — ED PROVIDER NOTE - HEME LYMPH, MLM
DISCHARGE SUMMARY  Admit date: 7/31/2020    Discharge date:     Admitting physician: Xavi Gandara MD    Surgery:  Left total hip arthroplasty, direct anterior approach 7/31/20    Reason for admission:  above    Hospital Course:  Patient was admitted for the above procedures which they tolerated well.  Please see EMR for further details of their admission.  Wounds were clean and dry at time of discharge.    Discharge Condition: stable     Discharge Disposition:  Home, stable condition               Xavi Gandara M.D.  Orthopaedic Surgery  [facility]  [phone]       No adenopathy or splenomegaly. No cervical or inguinal lymphadenopathy.

## 2022-06-21 NOTE — ED PROVIDER NOTE - PATIENT PORTAL LINK FT
You can access the FollowMyHealth Patient Portal offered by [facility] by registering at the following website: [email or web address]. By joining [employer]’s FollowMyHealth portal, you will also be able to view your health information using other applications (apps) compatible with our system.

## 2022-06-21 NOTE — ED PROVIDER NOTE - NSFOLLOWUPINSTRUCTIONS_ED_ALL_ED_FT
Stop taking your metoprolol and start coreg 12.5mg orally twice daily.      Hypertension, also called high blood pressure, is when the force of the blood pressing against the walls of the arteries is too strong. Arteries are blood vessels that carry blood from your heart throughout your body. Hypertension forces the heart to work harder to pump blood and may cause the arteries to become narrow or stiff.      Understanding blood pressure readings    Your personal target blood pressure may vary depending on your medical conditions, your age, and other factors. A blood pressure reading includes a higher number over a lower number. Ideally, your blood pressure should be below 120/80. You should know that:  •The first, or top, number is called the systolic pressure. It is a measure of the pressure in your arteries as your heart beats.      •The second, or bottom number, is called the diastolic pressure. It is a measure of the pressure in your arteries as the heart relaxes.      Blood pressure is classified into four stages. Based on your blood pressure reading, your health care provider may use the following stages to determine what type of treatment you need, if any. Systolic pressure and diastolic pressure are measured in a unit called mmHg.    Normal    •Systolic pressure: below 120.      •Diastolic pressure: below 80.      Elevated    •Systolic pressure: 120–129.      •Diastolic pressure: below 80.      Hypertension stage 1    •Systolic pressure: 130–139.      •Diastolic pressure: 80–89.      Hypertension stage 2    •Systolic pressure: 140 or above.      •Diastolic pressure: 90 or above.        How can this condition affect me?    Managing your hypertension is an important responsibility. Over time, hypertension can damage the arteries and decrease blood flow to important parts of the body, including the brain, heart, and kidneys. Having untreated or uncontrolled hypertension can lead to:  •A heart attack.      •A stroke.      •A weakened blood vessel (aneurysm).      •Heart failure.      •Kidney damage.      •Eye damage.      •Metabolic syndrome.      •Memory and concentration problems.      •Vascular dementia.        What actions can I take to manage this condition?    Hypertension can be managed by making lifestyle changes and possibly by taking medicines. Your health care provider will help you make a plan to bring your blood pressure within a normal range.      Nutrition    •Eat a diet that is high in fiber and potassium, and low in salt (sodium), added sugar, and fat. An example eating plan is called the Dietary Approaches to Stop Hypertension (DASH) diet. To eat this way:  •Eat plenty of fresh fruits and vegetables. Try to fill one-half of your plate at each meal with fruits and vegetables.      •Eat whole grains, such as whole-wheat pasta, brown rice, or whole-grain bread. Fill about one-fourth of your plate with whole grains.      •Eat low-fat dairy products.      •Avoid fatty cuts of meat, processed or cured meats, and poultry with skin. Fill about one-fourth of your plate with lean proteins such as fish, chicken without skin, beans, eggs, and tofu.      •Avoid pre-made and processed foods. These tend to be higher in sodium, added sugar, and fat.        •Reduce your daily sodium intake. Most people with hypertension should eat less than 1,500 mg of sodium a day.        Lifestyle      •Work with your health care provider to maintain a healthy body weight or to lose weight. Ask what an ideal weight is for you.      •Get at least 30 minutes of exercise that causes your heart to beat faster (aerobic exercise) most days of the week. Activities may include walking, swimming, or biking.      •Include exercise to strengthen your muscles (resistance exercise), such as weight lifting, as part of your weekly exercise routine. Try to do these types of exercises for 30 minutes at least 3 days a week.      • Do not use any products that contain nicotine or tobacco, such as cigarettes, e-cigarettes, and chewing tobacco. If you need help quitting, ask your health care provider.      •Control any long-term (chronic) conditions you have, such as high cholesterol or diabetes.      •Identify your sources of stress and find ways to manage stress. This may include meditation, deep breathing, or making time for fun activities.      Alcohol use   • Do not drink alcohol if:  •Your health care provider tells you not to drink.      •You are pregnant, may be pregnant, or are planning to become pregnant.      •If you drink alcohol:•Limit how much you use to:  •0–1 drink a day for women.      •0–2 drinks a day for men.        •Be aware of how much alcohol is in your drink. In the [place] one drink equals one 12 oz bottle of beer (355 mL), one 5 oz glass of wine (148 mL), or one 1½ oz glass of hard liquor (44 mL).        Medicines     Your health care provider may prescribe medicine if lifestyle changes are not enough to get your blood pressure under control and if:  •Your systolic blood pressure is 130 or higher.      •Your diastolic blood pressure is 80 or higher.      Take medicines only as told by your health care provider. Follow the directions carefully. Blood pressure medicines must be taken as told by your health care provider. The medicine does not work as well when you skip doses. Skipping doses also puts you at risk for problems.      Monitoring    Before you monitor your blood pressure:  •Do not smoke, drink caffeinated beverages, or exercise within 30 minutes before taking a measurement.      •Use the bathroom and empty your bladder (urinate).      •Sit quietly for at least 5 minutes before taking measurements.      Monitor your blood pressure at home as told by your health care provider. To do this:  •Sit with your back straight and supported.      •Place your feet flat on the floor. Do not cross your legs.      •Support your arm on a flat surface, such as a table. Make sure your upper arm is at heart level.      •Each time you measure, take two or three readings one minute apart and record the results.      You may also need to have your blood pressure checked regularly by your health care provider.    General information    •Talk with your health care provider about your diet, exercise habits, and other lifestyle factors that may be contributing to hypertension.      •Review all the medicines you take with your health care provider because there may be side effects or interactions.      •Keep all visits as told by your health care provider. Your health care provider can help you create and adjust your plan for managing your high blood pressure.        Where to find more information    •National Heart, Lung, and Blood [place]: www.nhlbi.nih.gov      •American Heart Association: www.heart.org        Contact a health care provider if:    •You think you are having a reaction to medicines you have taken.      •You have repeated (recurrent) headaches.      •You feel dizzy.      •You have swelling in your ankles.      •You have trouble with your vision.        Get help right away if:    •You develop a severe headache or confusion.      •You have unusual weakness or numbness, or you feel faint.      •You have severe pain in your chest or abdomen.      •You vomit repeatedly.      •You have trouble breathing.      These symptoms may represent a serious problem that is an emergency. Do not wait to see if the symptoms will go away. Get medical help right away. Call your local emergency services (911 in the [place] Do not drive yourself to the hospital.       Summary    •Hypertension is when the force of blood pumping through your arteries is too strong. If this condition is not controlled, it may put you at risk for serious complications.      •Your personal target blood pressure may vary depending on your medical conditions, your age, and other factors. For most people, a normal blood pressure is less than 120/80.      •Hypertension is managed by lifestyle changes, medicines, or both.      •Lifestyle changes to help manage hypertension include losing weight, eating a healthy, low-sodium diet, exercising more, stopping smoking, and limiting alcohol.      This information is not intended to replace advice given to you by your health care provider. Make sure you discuss any questions you have with your health care provider.      Document Revised: 01/22/2021 Document Reviewed: 11/17/2020    Elsevier Patient Education © 2022 [employer] Inc[employer]

## 2022-06-21 NOTE — ED PROVIDER NOTE - OBJECTIVE STATEMENT
68 y/o M with hx of HTN with c/o worst headache today associated with nausea and elevated BP 200s/100s.  pt called cardio, Dr. Gill, who recommended ER evaluation.

## 2022-06-21 NOTE — ED PROVIDER NOTE - CARE PROVIDER_API CALL
Davion Gill)  Cardio [facility]  [address] [place] [address]  Phone: [phone]  Fax: [phone]  Follow Up Time:

## 2022-06-21 NOTE — ED ADULT NURSE NOTE - NSIMPLEMENTINTERV_GEN_ALL_ED
Yolanda Alexander is an 19 year old female  0 here with concerns about vaginal discharge and odor    Past Medical History:   Diagnosis Date   • Alopecia areata     dx at age 11   • Anxiety    • Depressive disorder     mild, per pt     Past Surgical History:   Procedure Laterality Date   • NO PAST SURGERIES       Family History   Problem Relation Age of Onset   • Hypertension Father    • High cholesterol Father    • Breast cancer Maternal Grandmother      Social History   Substance Use Topics   • Smoking status: Current Every Day Smoker     Packs/day: 0.50     Years: 1.00     Types: Cigarettes   • Smokeless tobacco: Former User   • Alcohol use No       Prior to Admission Meds:  (Not in a hospital admission)  Scheduled Meds:  Continuous Infusions:  PRN Meds:    Allergies: ALLERGIES:  No Known Allergies    Active Problems:    * No active hospital problems. *    Blood pressure 118/78, pulse 84, height 5' 4\" (1.626 m), weight 56.7 kg, last menstrual period 2017.    ROS WNL (within normal limits). Patient denies problems with bowel or bladder habits. No problems with headaches or vision changes.    HPI: Patient has been experiencing vaginal odor and discharge for 2 months. She is on Day 2 of her period today.   OK for STD screen via urine sample and will be treated for BV today  Patient would also like to restart OCPs today    Physical Exam   No exam, on menses    Assessment:  STD screen  BV  Contraception    Plan:  Awaiting results  Rx for metronidazole 500 mg bid for 7 days  Rx for Seasonique refilled to pharmacy for one year. RTC 1 year for Pap and pelvic    Alicja Mota NP  2017   Implemented All Fall with Harm Risk Interventions:  [place] to call system. Call bell, personal items and telephone within reach. Instruct patient to call for assistance. Room bathroom lighting operational. Non-slip footwear when patient is off stretcher. Physically safe environment: no spills, clutter or unnecessary equipment. Stretcher in lowest position, wheels locked, appropriate side rails in place. Provide visual cue, wrist band, yellow gown, etc. Monitor gait and stability. Monitor for mental status changes and reorient to person, place, and time. Review medications for side effects contributing to fall risk. Reinforce activity limits and safety measures with patient and family. Provide visual clues: red socks.

## 2022-06-21 NOTE — ED ADULT NURSE NOTE - OBJECTIVE STATEMENT
Patient A/Ox4 with clear speech and a steady gait. Wife at bedside. C/o 10/10 headache, pain behind the eyes that began suddenly this AM while watching TV. Denies strenuous activity. HTN noted in triage, patient on BP meds and denies missing any doses. Took Tylenol and Advil PTA with no relief. Denies LOC, dizziness, n/v/d, numbness/tingling or visual disturbances. IV, labs and EKG done. Telemetry monitor on. Call light in reach. Patient A/Ox4 with clear speech and a steady gait. Wife at bedside. C/o 10/10 headache, pain behind the eyes that began suddenly this AM while watching TV. Denies strenuous activity. HTN noted in triage, patient on BP meds and denies missing any doses. Took Tylenol and Advil PTA with no relief. Denies LOC, dizziness, n/v/d, numbness/tingling or visual disturbances. IV, labs and EKG done. Telemetry monitor on. Call light in reach. MD Ladd called to come to bedside and see patient.

## 2022-06-21 NOTE — ED PROVIDER NOTE - PROGRESS NOTE DETAILS
seen by cardiology Dr. Botello, states to stop metoprolol, start coreg 12.5 mg po bid will f/u in office

## 2022-06-21 NOTE — ED ADULT NURSE NOTE - NSFALLRSKASSESSDT_ED_ALL_ED
HEP B 1ml administered to LD IM, VIS given to patient, Patient tolerated vaccine well
21-Jun-2022 16:43

## 2022-06-22 VITALS
HEART RATE: 68 BPM | DIASTOLIC BLOOD PRESSURE: 78 MMHG | SYSTOLIC BLOOD PRESSURE: 140 MMHG | OXYGEN SATURATION: 97 % | TEMPERATURE: 98 F | RESPIRATION RATE: 16 BRPM

## 2022-06-22 PROCEDURE — 99285 EMERGENCY DEPT VISIT HI MDM: CPT

## 2022-06-22 RX ORDER — CARVEDILOL PHOSPHATE 80 MG/1
1 CAPSULE, EXTENDED RELEASE ORAL
Qty: 60 | Refills: 0
Start: 2022-06-22 | End: 2022-07-21

## 2022-06-22 RX ORDER — CARVEDILOL PHOSPHATE 80 MG/1
12.5 CAPSULE, EXTENDED RELEASE ORAL ONCE
Refills: 0 | Status: COMPLETED | OUTPATIENT
Start: 2022-06-22 | End: 2022-06-22

## 2022-06-22 RX ORDER — ACETAMINOPHEN 500 MG
650 TABLET ORAL ONCE
Refills: 0 | Status: COMPLETED | OUTPATIENT
Start: 2022-06-22 | End: 2022-06-22

## 2022-06-22 RX ADMIN — Medication 325 MILLIGRAM(S): at 09:14

## 2022-06-22 RX ADMIN — CARVEDILOL PHOSPHATE 12.5 MILLIGRAM(S): 80 CAPSULE, EXTENDED RELEASE ORAL at 09:14

## 2022-06-22 NOTE — ED ADULT NURSE REASSESSMENT NOTE - NS ED NURSE REASSESS COMMENT FT1
Patient slept through the night, blood pressure within desired range, awaiting morning cardiac consult.

## 2022-06-22 NOTE — CONSULT NOTE ADULT - ATTENDING COMMENTS
HTN urgency likely related to salt load.  Does not seem headache related to BP, as BP better this AM and patient still with headache.  To change Toprol to carvedilol bid.  To follow with me in the office in the coming weeks.

## 2022-06-22 NOTE — CONSULT NOTE ADULT - ASSESSMENT
68 yo male PMH HTN, WAQAS, severe AS s/p bio AVR in May 2022 presented to ED yesterday due to elevated BP and severe HA, cardiology consulted for hypertensive urgency.    -EKG shows sinus bradycardia with first degree AV block, no significant change from prior  -Patient now with improved BP s/p multiple PO and IV anti-hypertensive medications  -Suspect hypertensive urgency in setting of   -Can add diuretic  -No cardiac contraindication to d/c, patient advised to follow up outpatient for BP check in 2 days  68 yo male PMH HTN, WAQAS, severe AS s/p bio AVR in May 2022 presented to ED yesterday due to elevated BP and severe HA, cardiology consulted for hypertensive urgency.    -EKG shows sinus bradycardia with first degree AV block, no significant change from prior  -Trop neg x1  -Patient now with improved BP s/p multiple PO and IV anti-hypertensive medications  -Suspect hypertensive urgency in setting of increased salt intake  -Change Metoprolol 100mg qd to Carvedilol 12.5mg BID, give dose now  -No cardiac contraindication to d/c, patient advised to follow up outpatient follow up in 2 weeks for BP check

## 2022-06-22 NOTE — CONSULT NOTE ADULT - SUBJECTIVE AND OBJECTIVE BOX
Patient is a 67y old  Male who presents with a chief complaint of     HPI: 68 yo male PMH HTN, WAQAS, severe AS s/p bio AVR in May 2022 presented to ED yesterday due to elevated BP and severe HA. Patient states he woke up yesterday morning with a severe, sharp, throbbing headache, worse behind his eyes and top of his head. Describes it as "the worst headache of his life." Patient took his BP at home, SBP 180s. Called his PCP and Cardiologist who both told him to come to ED for further evaluation. Patient states he has been compliant with all BP Medications including Metoprolol and Lisinopril. Patient does report day prior to onset of symptoms he had a Big Mac and [place]. In ED, patient received 1g IV Tylenol x1, 30mg IVP Toradol x1, 0.1 mg Clonidine PO x1, 100mg Labetalol PO x1, 10mg IVP Labetalol x1, 20mg IVP Labetalol x1 with improvement in symptoms. Patient states his HA is improved, now only complaining of soreness behind eyes. Denies any CP, SOB, changes in vision, palpitations, dizziness. Patient follows regularly with cardiologist Dr. Gill. Had TTE in March 2022 which showed severe AS, s/p bio AVR by Dr. Cameron in May 2022. States he has been feeling well since then.      PAST MEDICAL & SURGICAL HISTORY:  Hypertension  Testosterone deficiency  H/O hyperlipidemia  Renal colic  Unilateral inguinal hernia with obstruction and without gangrene, recurrence not specified  Heart murmur  Obstructive sleep apnea  H/O aortic valve stenosis  Status post medial meniscus repair  2015  Collapsed lung  H/O lithotripsy      ECHO  FINDINGS: March 2022 severe AS, mod LVH      MEDICATIONS  (STANDING):    MEDICATIONS  (PRN):      FAMILY HISTORY:  Family history of emphysema (Mother)  Family history of heart attack (Mother)  Family history of type 2 diabetes mellitus in brother (Sibling)          HEENT: denies blurry vision,  Respiratory: denies SOB, WHALEY, cough  Cardiovascular: denies CP, palpitations, LE edema  Gastrointestinal: denies nausea, vomiting, abdominal pain  Neurologic: + headache improved, denies weakness, dizziness        SOCIAL HISTORY:  No tobacco or drug use    Vital Signs Last 24 Hrs  T(C): 36.7 (21 Jun 2022 21:41), Max: 36.7 (21 Jun 2022 16:13)  T(F): 98 (21 Jun 2022 21:41), Max: 98.1 (21 Jun 2022 16:13)  HR: 64 (22 Jun 2022 06:11) (60 - 73)  BP: 167/75 (22 Jun 2022 06:11) (157/67 - 208/92)  BP(mean): --  RR: 16 (22 Jun 2022 06:11) (16 - 18)  SpO2: 98% (22 Jun 2022 06:11) (97% - 98%)    Physical Exam:  General: Well developed, well nourished, NAD  HEENT: NCAT, PERRLA, EOMI bl, moist mucous membranes   Neck: Supple  Neurology: answering questions appropriately, moving all extremities spontaneously  Respiratory: CTA B/L, No W/R/R  CV: RRR, +S1/S2, no murmurs  Abdominal: Soft, NT, ND +BSx4, no palpable masses  Extremities: No C/C/E, + peripheral pulses  Skin: warm, dry, normal color      ECG: sinus bradycardia with 1st degree AV block,     I&O's Detail      LABS:                        12.5   13.27 )-----------( 224      ( 21 Jun 2022 16:22 )             40.6     06-21    143  |  110<H>  |  17  ----------------------------<  119<H>  3.7   |  28  |  0.93    Ca    8.8      21 Jun 2022 16:22  Phos  3.6     06-21  Mg     2.2     06-21    TPro  7.4  /  Alb  3.3  /  TBili  0.3  /  DBili  x   /  AST  15  /  ALT  19  /  AlkPhos  60  06-21        PT/INR - ( 21 Jun 2022 16:22 )   PT: 12.4 sec;   INR: 1.06 ratio             I&O's Summary    BNP  RADIOLOGY & ADDITIONAL STUDIES:  < from: CT Head No Cont (06.21.22 @ 17:41) >    ACC: [id number] EXAM:  CT BRAIN                          PROCEDURE DATE:  06/21/2022          INTERPRETATION:  CT HEAD WITHOUT CONTRAST    INDICATION: 67 years old. Male. worst HA, vomiting, elev BP.    COMPARISON: None available.    TECHNIQUE: Noncontrast axial CT head was obtained from the skull base to   vertex.    FINDINGS:  No acute intracranial hemorrhage, mass effect or midline shift.  No CT evidence of acute large vascular territory infarct.  The ventricles and cortical sulci are within normal limits.  Scattered hypodensities in the periventricular white matter are   nonspecific, but likely sequela of small vessel ischemic disease.    Prominent perivascular spaces are noted in the bilateral basal ganglia.    The visualized paranasal sinuses and mastoid air cells are well aerated.  No displaced calvarial fracture.    IMPRESSION:  No acute intracranial hemorrhage or mass effect.    --- End of Report ---            SAADIA HATCH MD; Attending Radiologist  This document has been electronically signed. Jun 21 2022  5:52PM    < end of copied text >   Patient is a 67y old  Male who presents with a chief complaint of     HPI: 68 yo male PMH HTN, WAQAS, severe AS s/p bio AVR in May 2022 presented to ED yesterday due to elevated BP and severe HA. Patient states he woke up yesterday morning with a severe, sharp, throbbing headache, worse behind his eyes and top of his head. Describes it as "the worst headache of his life." Patient took his BP at home, SBP 180s. Called his PCP and Cardiologist who both told him to come to ED for further evaluation. Patient states he has been compliant with all BP Medications including Metoprolol and Lisinopril. Patient does report day prior to onset of symptoms he had a Big Mac and [place]. In ED, patient received 1g IV Tylenol x1, 30mg IVP Toradol x1, 0.1 mg Clonidine PO x1, 100mg Labetalol PO x1, 10mg IVP Labetalol x1, 20mg IVP Labetalol x1 with improvement in symptoms. Patient states his HA is improved but still mildly present, describing soreness behind eyes. Denies any CP, SOB, changes in vision, palpitations, dizziness. Patient follows regularly with cardiologist Dr. Gill. Had TTE in March 2022 which showed severe AS, s/p bio AVR by Dr. Cameron in May 2022. States he has been feeling well since then.      PAST MEDICAL & SURGICAL HISTORY:  Hypertension  Testosterone deficiency  H/O hyperlipidemia  Renal colic  Unilateral inguinal hernia with obstruction and without gangrene, recurrence not specified  Heart murmur  Obstructive sleep apnea  H/O aortic valve stenosis  Status post medial meniscus repair  2015  Collapsed lung  H/O lithotripsy      ECHO  FINDINGS: March 2022 severe AS, mod LVH      MEDICATIONS  (STANDING):    MEDICATIONS  (PRN):      FAMILY HISTORY:  Family history of emphysema (Mother)  Family history of heart attack (Mother)  Family history of type 2 diabetes mellitus in brother (Sibling)          HEENT: denies blurry vision,  Respiratory: denies SOB, WHALEY, cough  Cardiovascular: denies CP, palpitations, LE edema  Gastrointestinal: denies nausea, vomiting, abdominal pain  Neurologic: + headache improved, denies weakness, dizziness        SOCIAL HISTORY:  No tobacco or drug use    Vital Signs Last 24 Hrs  T(C): 36.7 (21 Jun 2022 21:41), Max: 36.7 (21 Jun 2022 16:13)  T(F): 98 (21 Jun 2022 21:41), Max: 98.1 (21 Jun 2022 16:13)  HR: 64 (22 Jun 2022 06:11) (60 - 73)  BP: 167/75 (22 Jun 2022 06:11) (157/67 - 208/92)  BP(mean): --  RR: 16 (22 Jun 2022 06:11) (16 - 18)  SpO2: 98% (22 Jun 2022 06:11) (97% - 98%)    Physical Exam:  General: Well developed, well nourished, NAD  HEENT: NCAT, PERRLA, EOMI bl, moist mucous membranes   Neck: Supple  Neurology: answering questions appropriately, moving all extremities spontaneously  Respiratory: CTA B/L, No W/R/R  CV: RRR, +S1/S2  Abdominal: Soft, NT, ND +BSx4, no palpable masses  Extremities: No C/C/E, + peripheral pulses  Skin: warm, dry, normal color, well healing scar sternum      ECG: sinus bradycardia with 1st degree AV block, no significant change from prior    I&O's Detail      LABS:                        12.5   13.27 )-----------( 224      ( 21 Jun 2022 16:22 )             40.6     06-21    143  |  110<H>  |  17  ----------------------------<  119<H>  3.7   |  28  |  0.93    Ca    8.8      21 Jun 2022 16:22  Phos  3.6     06-21  Mg     2.2     06-21    TPro  7.4  /  Alb  3.3  /  TBili  0.3  /  DBili  x   /  AST  15  /  ALT  19  /  AlkPhos  60  06-21        PT/INR - ( 21 Jun 2022 16:22 )   PT: 12.4 sec;   INR: 1.06 ratio             I&O's Summary    BNP  RADIOLOGY & ADDITIONAL STUDIES:  < from: CT Head No Cont (06.21.22 @ 17:41) >    ACC: [id number] EXAM:  CT BRAIN                          PROCEDURE DATE:  06/21/2022          INTERPRETATION:  CT HEAD WITHOUT CONTRAST    INDICATION: 67 years old. Male. worst HA, vomiting, elev BP.    COMPARISON: None available.    TECHNIQUE: Noncontrast axial CT head was obtained from the skull base to   vertex.    FINDINGS:  No acute intracranial hemorrhage, mass effect or midline shift.  No CT evidence of acute large vascular territory infarct.  The ventricles and cortical sulci are within normal limits.  Scattered hypodensities in the periventricular white matter are   nonspecific, but likely sequela of small vessel ischemic disease.    Prominent perivascular spaces are noted in the bilateral basal ganglia.    The visualized paranasal sinuses and mastoid air cells are well aerated.  No displaced calvarial fracture.    IMPRESSION:  No acute intracranial hemorrhage or mass effect.    --- End of Report ---            SAADIA HTACH MD; Attending Radiologist  This document has been electronically signed. Jun 21 2022  5:52PM    < end of copied text >

## 2022-06-23 ENCOUNTER — INPATIENT (INPATIENT)
Facility: HOSPITAL | Age: 67
LOS: 1 days | Discharge: HOME | DRG: 305 | End: 2022-06-25
Attending: STUDENT IN AN ORGANIZED HEALTH CARE EDUCATION/TRAINING PROGRAM | Admitting: STUDENT IN AN ORGANIZED HEALTH CARE EDUCATION/TRAINING PROGRAM
Payer: MEDICARE

## 2022-06-23 VITALS
SYSTOLIC BLOOD PRESSURE: 190 MMHG | WEIGHT: 203.05 LBS | OXYGEN SATURATION: 99 % | HEIGHT: 66 IN | RESPIRATION RATE: 16 BRPM | DIASTOLIC BLOOD PRESSURE: 90 MMHG | TEMPERATURE: 98 F | HEART RATE: 69 BPM

## 2022-06-23 DIAGNOSIS — Z98.89 OTHER SPECIFIED POSTPROCEDURAL STATES: Chronic | ICD-10-CM

## 2022-06-23 DIAGNOSIS — J98.19 OTHER PULMONARY COLLAPSE: Chronic | ICD-10-CM

## 2022-06-23 DIAGNOSIS — I16.0 HYPERTENSIVE URGENCY: ICD-10-CM

## 2022-06-23 DIAGNOSIS — Z98.890 OTHER SPECIFIED POSTPROCEDURAL STATES: Chronic | ICD-10-CM

## 2022-06-23 LAB
ALBUMIN SERPL ELPH-MCNC: 3.4 G/DL — SIGNIFICANT CHANGE UP (ref 3.3–5)
ALP SERPL-CCNC: 61 U/L — SIGNIFICANT CHANGE UP (ref 40–120)
ALT FLD-CCNC: 28 U/L — SIGNIFICANT CHANGE UP (ref 12–78)
ANION GAP SERPL CALC-SCNC: 4 MMOL/L — LOW (ref 5–17)
APPEARANCE UR: CLEAR — SIGNIFICANT CHANGE UP
APTT BLD: 29.5 SEC — SIGNIFICANT CHANGE UP (ref 27.5–35.5)
AST SERPL-CCNC: 28 U/L — SIGNIFICANT CHANGE UP (ref 15–37)
BASOPHILS # BLD AUTO: 0.04 K/UL — SIGNIFICANT CHANGE UP (ref 0–0.2)
BASOPHILS NFR BLD AUTO: 0.3 % — SIGNIFICANT CHANGE UP (ref 0–2)
BILIRUB SERPL-MCNC: 0.4 MG/DL — SIGNIFICANT CHANGE UP (ref 0.2–1.2)
BILIRUB UR-MCNC: NEGATIVE — SIGNIFICANT CHANGE UP
BUN SERPL-MCNC: 20 MG/DL — SIGNIFICANT CHANGE UP (ref 7–23)
CALCIUM SERPL-MCNC: 9 MG/DL — SIGNIFICANT CHANGE UP (ref 8.5–10.1)
CHLORIDE SERPL-SCNC: 106 MMOL/L — SIGNIFICANT CHANGE UP (ref 96–108)
CK MB CFR SERPL CALC: 1.9 NG/ML — SIGNIFICANT CHANGE UP (ref 0–3.6)
CO2 SERPL-SCNC: 31 MMOL/L — SIGNIFICANT CHANGE UP (ref 22–31)
COLOR SPEC: YELLOW — SIGNIFICANT CHANGE UP
CREAT SERPL-MCNC: 1 MG/DL — SIGNIFICANT CHANGE UP (ref 0.5–1.3)
DIFF PNL FLD: NEGATIVE — SIGNIFICANT CHANGE UP
EGFR: 82 ML/MIN/1.73M2 — SIGNIFICANT CHANGE UP
EOSINOPHIL # BLD AUTO: 0.36 K/UL — SIGNIFICANT CHANGE UP (ref 0–0.5)
EOSINOPHIL NFR BLD AUTO: 2.5 % — SIGNIFICANT CHANGE UP (ref 0–6)
ERYTHROCYTE [SEDIMENTATION RATE] IN BLOOD: 2 MM/HR — SIGNIFICANT CHANGE UP (ref 0–20)
GLUCOSE SERPL-MCNC: 110 MG/DL — HIGH (ref 70–99)
GLUCOSE UR QL: NEGATIVE — SIGNIFICANT CHANGE UP
HCT VFR BLD CALC: 39.6 % — SIGNIFICANT CHANGE UP (ref 39–50)
HGB BLD-MCNC: 12.4 G/DL — LOW (ref 13–17)
IMM GRANULOCYTES NFR BLD AUTO: 0.3 % — SIGNIFICANT CHANGE UP (ref 0–1.5)
INR BLD: 1.01 RATIO — SIGNIFICANT CHANGE UP (ref 0.88–1.16)
KETONES UR-MCNC: NEGATIVE — SIGNIFICANT CHANGE UP
LEUKOCYTE ESTERASE UR-ACNC: NEGATIVE — SIGNIFICANT CHANGE UP
LYMPHOCYTES # BLD AUTO: 17.5 % — SIGNIFICANT CHANGE UP (ref 13–44)
LYMPHOCYTES # BLD AUTO: 2.54 K/UL — SIGNIFICANT CHANGE UP (ref 1–3.3)
MAGNESIUM SERPL-MCNC: 2.3 MG/DL — SIGNIFICANT CHANGE UP (ref 1.6–2.6)
MCHC RBC-ENTMCNC: 26.8 PG — LOW (ref 27–34)
MCHC RBC-ENTMCNC: 31.3 GM/DL — LOW (ref 32–36)
MCV RBC AUTO: 85.5 FL — SIGNIFICANT CHANGE UP (ref 80–100)
MONOCYTES # BLD AUTO: 0.85 K/UL — SIGNIFICANT CHANGE UP (ref 0–0.9)
MONOCYTES NFR BLD AUTO: 5.9 % — SIGNIFICANT CHANGE UP (ref 2–14)
NEUTROPHILS # BLD AUTO: 10.67 K/UL — HIGH (ref 1.8–7.4)
NEUTROPHILS NFR BLD AUTO: 73.5 % — SIGNIFICANT CHANGE UP (ref 43–77)
NITRITE UR-MCNC: NEGATIVE — SIGNIFICANT CHANGE UP
NRBC # BLD: 0 /100 WBCS — SIGNIFICANT CHANGE UP (ref 0–0)
NT-PROBNP SERPL-SCNC: 542 PG/ML — HIGH (ref 0–125)
PH UR: 7 — SIGNIFICANT CHANGE UP (ref 5–8)
PLATELET # BLD AUTO: 202 K/UL — SIGNIFICANT CHANGE UP (ref 150–400)
POTASSIUM SERPL-MCNC: 5 MMOL/L — SIGNIFICANT CHANGE UP (ref 3.5–5.3)
POTASSIUM SERPL-SCNC: 5 MMOL/L — SIGNIFICANT CHANGE UP (ref 3.5–5.3)
PROT SERPL-MCNC: 7.7 G/DL — SIGNIFICANT CHANGE UP (ref 6–8.3)
PROT UR-MCNC: NEGATIVE — SIGNIFICANT CHANGE UP
PROTHROM AB SERPL-ACNC: 11.8 SEC — SIGNIFICANT CHANGE UP (ref 10.5–13.4)
RBC # BLD: 4.63 M/UL — SIGNIFICANT CHANGE UP (ref 4.2–5.8)
RBC # FLD: 14.7 % — HIGH (ref 10.3–14.5)
SARS-COV-2 RNA SPEC QL NAA+PROBE: SIGNIFICANT CHANGE UP
SODIUM SERPL-SCNC: 141 MMOL/L — SIGNIFICANT CHANGE UP (ref 135–145)
SP GR SPEC: 1.01 — SIGNIFICANT CHANGE UP (ref 1.01–1.02)
TROPONIN I, HIGH SENSITIVITY RESULT: 8.1 NG/L — SIGNIFICANT CHANGE UP
UROBILINOGEN FLD QL: NEGATIVE — SIGNIFICANT CHANGE UP
WBC # BLD: 14.51 K/UL — HIGH (ref 3.8–10.5)
WBC # FLD AUTO: 14.51 K/UL — HIGH (ref 3.8–10.5)

## 2022-06-23 PROCEDURE — 70450 CT HEAD/BRAIN W/O DYE: CPT | Mod: 26,MA

## 2022-06-23 PROCEDURE — 93010 ELECTROCARDIOGRAM REPORT: CPT

## 2022-06-23 PROCEDURE — 99223 1ST HOSP IP/OBS HIGH 75: CPT | Mod: GC

## 2022-06-23 PROCEDURE — 99285 EMERGENCY DEPT VISIT HI MDM: CPT

## 2022-06-23 RX ORDER — KETOROLAC TROMETHAMINE 30 MG/ML
15 SYRINGE (ML) INJECTION ONCE
Refills: 0 | Status: DISCONTINUED | OUTPATIENT
Start: 2022-06-23 | End: 2022-06-23

## 2022-06-23 RX ORDER — LABETALOL HCL 100 MG
10 TABLET ORAL ONCE
Refills: 0 | Status: COMPLETED | OUTPATIENT
Start: 2022-06-23 | End: 2022-06-23

## 2022-06-23 RX ORDER — ONDANSETRON 8 MG/1
4 TABLET, FILM COATED ORAL EVERY 8 HOURS
Refills: 0 | Status: DISCONTINUED | OUTPATIENT
Start: 2022-06-23 | End: 2022-06-25

## 2022-06-23 RX ORDER — LANOLIN ALCOHOL/MO/W.PET/CERES
3 CREAM (GRAM) TOPICAL AT BEDTIME
Refills: 0 | Status: DISCONTINUED | OUTPATIENT
Start: 2022-06-23 | End: 2022-06-25

## 2022-06-23 RX ORDER — METOPROLOL SUCCINATE 100 MG/1
100 TABLET, EXTENDED RELEASE ORAL DAILY
Qty: 30 | Refills: 3 | Status: DISCONTINUED | COMMUNITY
Start: 2022-05-10 | End: 2022-06-23

## 2022-06-23 RX ORDER — ACETAMINOPHEN 500 MG
650 TABLET ORAL EVERY 6 HOURS
Refills: 0 | Status: DISCONTINUED | OUTPATIENT
Start: 2022-06-23 | End: 2022-06-25

## 2022-06-23 RX ORDER — METOCLOPRAMIDE HCL 10 MG
10 TABLET ORAL ONCE
Refills: 0 | Status: COMPLETED | OUTPATIENT
Start: 2022-06-23 | End: 2022-06-23

## 2022-06-23 RX ORDER — ENOXAPARIN SODIUM 100 MG/ML
40 INJECTION SUBCUTANEOUS EVERY 24 HOURS
Refills: 0 | Status: DISCONTINUED | OUTPATIENT
Start: 2022-06-23 | End: 2022-06-25

## 2022-06-23 RX ORDER — OXYCODONE HYDROCHLORIDE 5 MG/1
5 TABLET ORAL ONCE
Refills: 0 | Status: DISCONTINUED | OUTPATIENT
Start: 2022-06-23 | End: 2022-06-23

## 2022-06-23 RX ORDER — NICARDIPINE HYDROCHLORIDE 30 MG/1
5 CAPSULE, EXTENDED RELEASE ORAL
Qty: 40 | Refills: 0 | Status: DISCONTINUED | OUTPATIENT
Start: 2022-06-23 | End: 2022-06-23

## 2022-06-23 RX ADMIN — Medication 15 MILLIGRAM(S): at 23:50

## 2022-06-23 RX ADMIN — Medication 10 MILLIGRAM(S): at 21:35

## 2022-06-23 RX ADMIN — NICARDIPINE HYDROCHLORIDE 25 MG/HR: 30 CAPSULE, EXTENDED RELEASE ORAL at 21:40

## 2022-06-23 RX ADMIN — OXYCODONE HYDROCHLORIDE 5 MILLIGRAM(S): 5 TABLET ORAL at 21:35

## 2022-06-23 RX ADMIN — Medication 10 MILLIGRAM(S): at 23:46

## 2022-06-23 NOTE — H&P ADULT - HISTORY OF PRESENT ILLNESS
67 year old male with PMHx of HTN, WAQAS, HLD, nonobstructive CAD on cardiac cath (EF 51% intraoperatively), severe AS s/p bio AVR presented to the ED with his wife complaining of elevated blood pressure and severe headache. He describes the headache as the worst headache of his life and located on the top of his head and behind his eyes. He was started on a nicardipine drip in the ED and his headache only mildly improved. His blood pressure did improve with the nicardipine infusion, so this was stopped and he was given a push of intravenous labetalol. Denies chest pain, palpitations, dyspnea, dizziness, abdominal pain, n/v/d.  Pt is 67 year old male with PMH of HTN, HLD, nonobstructive CAD on cardiac cath (EF 51% intraoperatively), severe AS s/p bio AVR, prediabetes, obesity, WAQAS, presented to the ED with his wife complaining of elevated blood pressure and severe headache. The headache started late in the afternoon initially mild and SBP was 170's, then the headache was severe with /105 at home and came to the ED. He describes the headache as the worst of his life, located on the top of his head and behind his eyes. He was started on a nicardipine drip in the ED and his headache only mildly improved. His blood pressure did improve with the nicardipine infusion, so this was stopped and he was given a push of intravenous labetalol. Denies chest pain, palpitations, dyspnea, dizziness, numbness, tingling, focal weakness, speech changes, abdominal pain, n/v/d.    *Pt was in the ED 2 nights ago for the same reason, BP improved, CT head was negative and was DC with changes in his BP meds.     ED course:   - Vitals: /90 > 145/69, HR 69, RR 16, temp 98.1, O2 Sat 99% at RA.   - Labs: WBC 14.51, Hb 12.4. INR 1.01.  Normal Cr, lytes.  Normal troponin and CKMB.  pBNP 542.    Normal UA. Negative covid PCR.  - CT head: No acute intracranial hemorrhage or mass effect. No interval change compared to prior exam from 6/21/2022.  - EKG: NSR, 68 bpm. Left axis deviation. Suggest LVH.  Nonspecific T abnormality.  Pending official reading.   - s/p nicardipine 5mg/hr, labetalol 10mg IVP x1, oxycodone 5mg PO x1, Ketorolac 15mg IVP x1, Reglan 10mg IVP x1.  Patient is a 67 year old male with PMH of HTN, HLD, nonobstructive CAD on cardiac cath (EF 51% intraoperatively), severe AS s/p bio AVR, prediabetes, obesity, WAQAS, presented to the ED with his wife complaining of elevated blood pressure and severe headache. The headache started late in the afternoon initially mild and SBP was 170's, then the headache was severe with /105 at home and came to the ED. He describes the headache as the worst of his life, located on the top of his head and behind his eyes. He was started on a nicardipine drip in the ED and his headache only mildly improved. His blood pressure did improve with the nicardipine infusion, so this was stopped and he was given a push of intravenous labetalol. Denies chest pain, palpitations, dyspnea, dizziness, numbness, tingling, focal weakness, speech changes, abdominal pain, n/v/d.      *Pt was in the ED 2 nights ago for the same reason, BP improved, CT head was negative and was DC with changes in his BP meds. Metoprolol was changed to carvedilol.    ED course:   - Vitals: /90 > 145/69, HR 69, RR 16, temp 98.1, O2 Sat 99% at RA.   - Labs: WBC 14.51, Hb 12.4. INR 1.01.  Normal Cr, lytes.  Normal troponin and CKMB.  pBNP 542.    Normal UA. Negative covid PCR.  - CT head: No acute intracranial hemorrhage or mass effect. No interval change compared to prior exam from 6/21/2022.  - EKG: NSR, 68 bpm. Left axis deviation. Suggest LVH.  Nonspecific T abnormality.  Pending official reading.   - s/p nicardipine 5mg/hr, labetalol 10mg IVP x1, oxycodone 5mg PO x1, Ketorolac 15mg IVP x1, Reglan 10mg IVP x1.

## 2022-06-23 NOTE — H&P ADULT - NSHPSOCIALHISTORY_GEN_ALL_CORE
Pt lives with wife  Independent for all his activities. Feeling good after recent aortic valve procedure.   Never smoker, occasional alcohol use, no drugs.   Covid vaccine Moderna x2 and booster 2021

## 2022-06-23 NOTE — ED PROVIDER NOTE - CLINICAL SUMMARY MEDICAL DECISION MAKING FREE TEXT BOX
hypertensive urgency, severe headache, f/u labs, ekg, ct head, pain control, blood pressure management, admit

## 2022-06-23 NOTE — ED ADULT TRIAGE NOTE - PAIN: PRESENCE, MLM
December 4, 2018      [facility]  [address] [place] [address]  Phone: [phone]  Fax: [phone]       Patient: Kisha Kaur   YOB: 1980  Date of Visit: 12/04/2018    To Whom It May Concern:    Pedro Luis Kaur  was at [facility] on 12/04/2018. She may return to work on 12/05/2018 with no restrictions. If you have any questions or concerns, or if I can be of further assistance, please do not hesitate to contact me.    Sincerely,    Brianne Domínguez MA     
December 4, 2018      Brian Cedeno MD  [address] [facility] [address] [place] [address]           [facility]  [address]  [place] [address]  Phone: [phone]  Fax: [phone]          Patient: Kisha Kaur   MR Number: [id number]   YOB: 1980   Date of Visit: 12/4/2018       Dear Dr. Brian Cedeno:    Thank you for referring Kisha Kaur to me for evaluation. Attached you will find relevant portions of my assessment and plan of care.    If you have questions, please do not hesitate to call me. I look forward to following Kisha Kaur along with you.    Sincerely,    Johanna Sanders MD    Enclosure  CC:  No Recipients    If you would like to receive this communication electronically, please contact [email or web address] or [phone] to request more information on [employer] access.    For providers and/or their staff who would like to refer a patient to [facility], please contact us through our one-stop-shop provider referral line, [facility] , at [phone].    If you feel you have received this communication in error or would no longer like to receive these types of communications, please e-mail [email or web address]         
complains of pain/discomfort

## 2022-06-23 NOTE — H&P ADULT - NSICDXPASTSURGICALHX_GEN_ALL_CORE_FT
PAST SURGICAL HISTORY:  Collapsed lung     H/O lithotripsy     Status post medial meniscus repair 2015     PAST SURGICAL HISTORY:  Collapsed lung     H/O aortic valve replacement     H/O inguinal hernia repair     H/O lithotripsy     S/P aortic valve replacement 05/2022    Status post medial meniscus repair 2015

## 2022-06-23 NOTE — H&P ADULT - ASSESSMENT
67 year old male with PMHx of HTN, WAQAS, HLD, nonobstructive CAD on cardiac cath (EF 51% intraoperatively), severe AS s/p bio AVR admitted for hypertensive urgency and severe headache. Pt is 67 year old male with PMH of HTN, HLD, nonobstructive CAD on cardiac cath (EF 51% intraoperatively), severe AS s/p bio AVR, prediabetes, obesity, WAQAS, presented to the ED with his wife complaining of elevated blood pressure and severe headache. Admitted for hypertensive urgency and severe headache. Pt is a 67 year old male with PMH of HTN, HLD, nonobstructive CAD on cardiac cath (EF 51% intraoperatively), severe AS s/p bio AVR, prediabetes, obesity, WAQAS, presented to the ED with his wife complaining of elevated blood pressure and severe headache. Admitted for hypertensive urgency and severe headache.

## 2022-06-23 NOTE — H&P ADULT - PROBLEM SELECTOR PLAN 3
- h/o severe aortic stenosis, s/p bio AVR in 5/4/22 with no complications and pt has been feeling good after surgery, able to walk with no symptoms   - No signs of volume overload on exam   - Echo after procedure with EF 50%, mild MR  - BMP slighly elevated 542 and downtrend from previous result before procedure.   - Monitor volume status, I&Os.  - Continue ASA.

## 2022-06-23 NOTE — ED PROVIDER NOTE - OBJECTIVE STATEMENT
67 male PMH HTN, WAQAS, severe AS s/p bio AVR in May 2022 presented to ED c/o severe headache and elevated blood pressure, 210/100, patient was recently in ER for the same, seen by cardiology, had metoprolol changed to coreg, patient states he has been taking his medication as prescribed, his headache had improved but returned again this afternoon, no fever, no vomiting.

## 2022-06-23 NOTE — H&P ADULT - PROBLEM SELECTOR PLAN 4
- Angio cath performed before AVR with non-obstructive CAD.   - Currently no chest pain.   - Negative troponin and CKMB  - EKG: NSR. Left axis deviation. Suggest LVH.  Nonspecific T abnormality.  Pending official reading.  - A1c 5.8 (5/22).  Lipid profile with normal chol 154, LDL 99. Low HDL 37.  - Continue home meds: statin, ASA, carvedilol.    - Monitor on telemetry

## 2022-06-23 NOTE — ED ADULT NURSE NOTE - NSIMPLEMENTINTERV_GEN_ALL_ED
Implemented All Universal Safety Interventions:  [place] to call system. Call bell, personal items and telephone within reach. Instruct patient to call for assistance. Room bathroom lighting operational. Non-slip footwear when patient is off stretcher. Physically safe environment: no spills, clutter or unnecessary equipment. Stretcher in lowest position, wheels locked, appropriate side rails in place.

## 2022-06-23 NOTE — H&P ADULT - NSICDXPASTMEDICALHX_GEN_ALL_CORE_FT
PAST MEDICAL HISTORY:  H/O aortic valve stenosis     H/O hyperlipidemia     Heart murmur     Hypertension     Obstructive sleep apnea     Renal colic     Testosterone deficiency     Unilateral inguinal hernia with obstruction and without gangrene, recurrence not specified      PAST MEDICAL HISTORY:  H/O hyperlipidemia     Hypertension     Obstructive sleep apnea     Renal colic     Testosterone deficiency     Unilateral inguinal hernia with obstruction and without gangrene, recurrence not specified

## 2022-06-23 NOTE — H&P ADULT - ATTENDING COMMENTS
67 year old male with PMH of HTN, HLD, nonobstructive CAD on cardiac cath (EF 51% intraoperatively), severe AS s/p bio AVR, prediabetes, obesity, WAQAS, presented to the ED with his wife complaining of elevated blood pressure and severe headache. Admitted for hypertensive urgency and severe headache. Admit to telemetry. Monitor BP and continue home antihypertensive regimen. BP corrected with nicardipine gtt and now off. Will follow BP and can give intravenous labetalol as needed. Cardiology Dr. Garcia consulted - to consider changes to home regimen for better BP control. CT head negative - no ICH. Discussed with patient and wife at bedside.    Agree with H&P as outlined above, edited where appropriate.

## 2022-06-23 NOTE — H&P ADULT - NSHPPHYSICALEXAM_GEN_ALL_CORE
T(C): 36.7 (06-23-22 @ 23:09), Max: 36.7 (06-23-22 @ 20:37)  HR: 90 (06-23-22 @ 23:09) (69 - 90)  BP: 145/69 (06-23-22 @ 23:09) (145/69 - 210/96)  RR: 18 (06-23-22 @ 23:09) (16 - 18)  SpO2: 97% (06-23-22 @ 23:09) (96% - 99%)    General: No apparent distress  Head: normocephalic, atraumatic  Eyes: EOMI, anicteric  ENT: moist mucous membranes, no pharyngeal exudates  Heart: rrr, S1, S2, systolic murmurs  Chest: CTA b/l, no rales, rhonchi, or wheezes  Abd: BS+, soft, NT, ND  Back: no CVA tenderness  Extr: no edema or cyanosis  Integument: flushed skin, warm, well perfusion, no suspicious lesions noted  Neuro: AA&Ox3, no focal weakness, sensation to light touch intact  Psych: normal affect T(C): 36.7 (06-23-22 @ 23:09), Max: 36.7 (06-23-22 @ 20:37)  HR: 90 (06-23-22 @ 23:09) (69 - 90)  BP: 145/69 (06-23-22 @ 23:09) (145/69 - 210/96)  RR: 18 (06-23-22 @ 23:09) (16 - 18)  SpO2: 97% (06-23-22 @ 23:09) (96% - 99%)    General: Obese, mild discomfort from persistent headache, no signs of respiratory distress.   Head: normocephalic, atraumatic  Eyes: EOMI.   ENT: moist mucous membranes.   Heart: S1,S2. RRR. Systolic murmur grade 2/6.  Chest: CTA b/l, no rales, rhonchi, or wheezes.   Abd: BS+, soft, NT, ND  Extremities: no LE edema or cyanosis. + pedal pulses.   Integument: flushed skin, warm, well perfused.   Neuro: AA&Ox3, no focal weakness, sensation to light touch intact. Face is symmetric, speech fluent and coherent.   Psych: normal affect T(C): 36.7 (06-23-22 @ 23:09), Max: 36.7 (06-23-22 @ 20:37)  HR: 90 (06-23-22 @ 23:09) (69 - 90)  BP: 145/69 (06-23-22 @ 23:09) (145/69 - 210/96)  RR: 18 (06-23-22 @ 23:09) (16 - 18)  SpO2: 97% (06-23-22 @ 23:09) (96% - 99%)    General: Obese, mild discomfort from persistent headache, no signs of respiratory distress.   Head: normocephalic, atraumatic  Eyes: EOMI. Pupils Reactive.  ENT: moist mucous membranes.   Heart: S1,S2. RRR. Systolic murmur grade 2/6.  Chest: CTA b/l, no rales, rhonchi, or wheezes.   Abd: BS+, soft, NT, ND  Extremities: no LE edema or cyanosis. + pedal pulses.   Integument: flushed skin, warm, well perfused.   Neuro: AA&Ox3, no focal weakness, sensation to light touch intact. Face is symmetric, speech fluent and coherent.   Psych: normal affect

## 2022-06-23 NOTE — H&P ADULT - NSHPREVIEWOFSYSTEMS_GEN_ALL_CORE
General: no fever, chills  Eyes: no vision changes  ENT: no hearing changes, nasal congestion, or sore throat  CV: no chest pain or palpitations  Pulm: no SOB, wheezing, cough, or hemoptysis  Abd/GI: no nausea, vomiting, diarrhea, constipation, abd pain  : no dysuria, hematuria, urinary frequency  MSK: no joint pain or myalgias  Neuro: no syncope, dizziness, focal weakness; ADMITS headache  Psych: no anxiety or depression  Endo: no heat or cold intolerance General: no fever, chills.  + Fatigue   Eyes: no blurry vison, no visual changes. "Seeing bugs when close the eyes"   ENT: no hearing changes, nasal congestion, or sore throat  CV: no chest pain or palpitations  Pulm: no SOB, wheezing, cough.  Abd/GI: no nausea, vomiting, diarrhea, constipation, abd pain  : no dysuria, hematuria, changes in frequency  MSK: no joint pain or myalgias  Neuro: no syncope, dizziness, focal weakness, numbness or tingling; ADMITS headache  Psych: no anxiety or depression General: no fever, chills. + Fatigue   Eyes: no blurry vison, no visual changes. "Seeing bugs when close the eyes"   ENT: no hearing changes, nasal congestion, or sore throat  CV: no chest pain or palpitations  Pulm: no SOB, wheezing, cough.  Abd/GI: no nausea, vomiting, diarrhea, constipation, abd pain  : no dysuria, hematuria, changes in frequency  MSK: no joint pain or myalgias  Neuro: no syncope, dizziness, focal weakness, numbness or tingling; ADMITS headache  Psych: no anxiety or depression

## 2022-06-23 NOTE — ED ADULT NURSE NOTE - OBJECTIVE STATEMENT
Received Pt awake and alert, was seen in Er on Tuesday Dx idiopathic hypertension, today c/o bad headache despite taking Tylenol and HTN.

## 2022-06-23 NOTE — ED ADULT TRIAGE NOTE - CHIEF COMPLAINT QUOTE
Patient complaining of severe headache was seen in ED this tuesday and was discharged home diagnosed to have idiopathic hypertension took tylenol at 6:30 pm

## 2022-06-23 NOTE — ED PROVIDER NOTE - CPE EDP RESP NORM
Amrit Orlando MD  Rebecca Hodgson  1951 08/06/2019    Patient Active Problem List   Diagnosis   • Paroxysmal atrial fibrillation (CMS/HCC)   • PATT (acute kidney injury) (CMS/HCC)   • Chest pain   • ASCVD (arteriosclerotic cardiovascular disease)   • AV block, 2nd degree   • Chronic anticoagulation with warfarin.   • Chronic left-sided low back pain with left-sided sciatica   • Cognitive communication deficit   • Disequilibrium   • Diuretic-induced hypokalemia   • Dyspnea   • Encephalopathy   • Essential hypertension   • Parkinson's disease (CMS/HCC)   • CKD (chronic kidney disease) stage 3, GFR 30-59 ml/min (CMS/HCC)       Dear Amrit Orlando MD:    Subjective     Rebecca Hodgson is a 68 y.o. female with the problems as listed above, presents    Chief complaint: To establish cardiac care and follow-up in a patient with history of known coronary artery disease and paroxysmal H fibrillation and complaining of palpitations.    History of Present Illness: Ms. Hodgson is a pleasant 68-year-old  female with history of known coronary artery disease, status post previous multiple percutaneous coronary interventions (at least 5) with the last one performed in 2013 performed in [facility], has recently moved to this area and is wanting to establish cardiac care and follow-up through our office.  On further questioning she complains of mainly having intermittent palpitations with rapid heartbeat.  She has history of paroxysmal atrial fibrillation.  She also complains of feeling tired and fatigued lately.  She has some intermittent chest pains some chest discomfort which seem to occur at random and seem to come and go spontaneously.  They are of moderate intensity and associated with some shortness of breath.  Some of the symptoms she states are similar to what she had prior to previous stenting.  She has dyspnea with moderate exertion with no PND, orthopnea.  She has chronic bilateral leg  "edema.      Allergies   Allergen Reactions   • Bactrim [Sulfamethoxazole-Trimethoprim] Diarrhea   • Codeine Hallucinations   • Morphine Other (See Comments)     Pt states \"my heart stopped\"   • Tetracyclines & Related Hives   :      Current Outpatient Medications:   •  albuterol sulfate HFA (PROVENTIL HFA) 108 (90 Base) MCG/ACT inhaler, Inhale 2 puffs., Disp: , Rfl:   •  amLODIPine (NORVASC) 10 MG tablet, Take 10 mg by mouth Every Evening., Disp: , Rfl:   •  carvedilol (COREG) 12.5 MG tablet, Take 12.5 mg by mouth 2 (Two) Times a Day With Meals., Disp: , Rfl:   •  clopidogrel (PLAVIX) 75 MG tablet, Take 75 mg by mouth., Disp: , Rfl:   •  donepezil (ARICEPT) 10 MG tablet, Take 10 mg by mouth Every Night., Disp: , Rfl:   •  furosemide (LASIX) 20 MG tablet, Take 20 mg by mouth Daily., Disp: , Rfl:   •  gabapentin (NEURONTIN) 600 MG tablet, Take 600 mg by mouth 3 (Three) Times a Day., Disp: , Rfl:   •  hydrALAZINE (APRESOLINE) 25 MG tablet, Take 25 mg by mouth 3 (Three) Times a Day., Disp: , Rfl:   •  HYDROcodone-acetaminophen (NORCO)  MG per tablet, Take 1 tablet by mouth Every 6 (Six) Hours As Needed for Moderate Pain ., Disp: , Rfl:   •  Melatonin 5 MG chewable tablet, Chew., Disp: , Rfl:   •  metFORMIN (GLUCOPHAGE) 500 MG tablet, Take 500 mg by mouth Daily With Breakfast., Disp: , Rfl:   •  nitroglycerin (NITROSTAT) 0.4 MG SL tablet, Place 0.4 mg under the tongue., Disp: , Rfl:   •  ondansetron ODT (ZOFRAN ODT) 4 MG disintegrating tablet, Take 4 mg by mouth., Disp: , Rfl:   •  pantoprazole (PROTONIX) 20 MG EC tablet, Take 20 mg by mouth Daily., Disp: , Rfl:   •  PARoxetine (PAXIL) 20 MG tablet, Take 20 mg by mouth Every Morning., Disp: , Rfl:   •  potassium chloride (K-DUR) 10 MEQ CR tablet, Take 10 mEq by mouth., Disp: , Rfl:   •  promethazine (PHENERGAN) 25 MG tablet, Take 1 tablet by mouth Every 6 (Six) Hours As Needed for Nausea or Vomiting., Disp: 15 tablet, Rfl: 0  •  QUEtiapine (SEROquel) 100 MG tablet, " Take 100 mg by mouth 2 (Two) Times a Day., Disp: , Rfl:   •  valsartan (DIOVAN) 320 MG tablet, Take 320 mg by mouth Daily., Disp: , Rfl:   •  warfarin (COUMADIN) 1 MG tablet, Take 3 mg by mouth., Disp: , Rfl:   •  baclofen (LIORESAL) 20 MG tablet, Take 20 mg by mouth., Disp: , Rfl:   •  CloNIDine (CATAPRES) 0.2 MG tablet, Take 0.2 mg by mouth., Disp: , Rfl:   •  LORazepam (ATIVAN) 0.5 MG tablet, Take 1 tablet by mouth Every 8 (Eight) Hours As Needed for Anxiety., Disp: 9 tablet, Rfl: 0    Past Medical History:   Diagnosis Date   • ASCVD (arteriosclerotic cardiovascular disease)    • Diabetes (CMS/HCC)    • Diverticulosis    • Essential hypertension    • Injury of back    • Parkinson disease (CMS/HCC)      Past Surgical History:   Procedure Laterality Date   • APPENDECTOMY     • BREAST SURGERY     • CHOLECYSTECTOMY     • CORONARY ANGIOPLASTY WITH STENT PLACEMENT     • HYSTERECTOMY       Family History   Problem Relation Age of Onset   • Heart disease Mother    • Heart disease Father      Social History     Tobacco Use   • Smoking status: Never Smoker   Substance Use Topics   • Alcohol use: No     Frequency: Never     Comment: denies   • Drug use: No     Comment: denies       Review of Systems   Constitution: Positive for malaise/fatigue. Negative for chills, diaphoresis and fever.   Cardiovascular: Positive for dyspnea on exertion, leg swelling and palpitations. Negative for chest pain, orthopnea and paroxysmal nocturnal dyspnea.   Respiratory: Negative for cough, hemoptysis and shortness of breath.    Endocrine: Negative for cold intolerance and heat intolerance.   Hematologic/Lymphatic: Does not bruise/bleed easily.   Skin: Positive for dry skin. Negative for rash.   Musculoskeletal: Positive for back pain. Negative for myalgias.   Gastrointestinal: Negative for abdominal pain, constipation, diarrhea, nausea and vomiting.   Genitourinary: Negative for dysuria and hematuria.   Neurological: Negative for dizziness,  "focal weakness and numbness.   Psychiatric/Behavioral: Positive for depression. The patient is nervous/anxious.    Allergic/Immunologic: Negative.        Objective   Blood pressure 122/65, pulse 62, height 170.2 cm (67\"), weight 95.3 kg (210 lb), SpO2 98 %.  Body mass index is 32.89 kg/m².        Physical Exam   Constitutional: She is oriented to person, place, and time. She appears well-developed and well-nourished.   HENT:   Mouth/Throat: Oropharynx is clear and moist.   Eyes: EOM are normal. Pupils are equal, round, and reactive to light.   Neck: Neck supple. No JVD present. No tracheal deviation present. No thyromegaly present.   Cardiovascular: Normal rate, regular rhythm, S1 normal and S2 normal. Exam reveals no gallop, no S3, no S4 and no friction rub.   No murmur heard.  Pulmonary/Chest: Effort normal and breath sounds normal.   Abdominal: Soft. Bowel sounds are normal. She exhibits no mass. There is no tenderness.   Musculoskeletal: Normal range of motion. She exhibits edema (2+ edema on both legs.).   Lymphadenopathy:     She has no cervical adenopathy.   Neurological: She is alert and oriented to person, place, and time.   Skin: Skin is warm and dry. No rash noted.   Psychiatric: She has a normal mood and affect.       Lab Results   Component Value Date     06/20/2019    K 4.4 06/20/2019     06/20/2019    CO2 21.4 (L) 06/20/2019    BUN 32 (H) 06/20/2019    CREATININE 1.52 (H) 06/20/2019    GLUCOSE 90 06/20/2019    CALCIUM 9.8 06/20/2019    AST 16 06/20/2019    ALT 9 06/20/2019    ALKPHOS 72 06/20/2019     Lab Results   Component Value Date    CKTOTAL 55 06/20/2019     Lab Results   Component Value Date    WBC 6.81 06/20/2019    HGB 10.5 (L) 06/20/2019    HCT 34.3 06/20/2019     06/20/2019     Lab Results   Component Value Date    INR 1.14 (H) 06/20/2019    INR 1.65 04/24/2019    INR 1.74 04/23/2019     Lab Results   Component Value Date    MG 2.1 06/20/2019     Lab Results   Component " Value Date    TSH 0.979 06/20/2019      Lab Results   Component Value Date    BNP 77.0 02/28/2019         ECG 12 Lead  Date/Time: 8/6/2019 1:56 PM  Performed by: Shell Lo CMA  Authorized by: Salomon España MD   Comparison: compared with previous ECG from 6/19/2019  Similar to previous ECG  Rhythm: sinus rhythm  BPM: 61  Conduction: conduction normal  ST Segments: ST segments normal            Assessment/Plan    Diagnosis Plan   1. ASCVD (arteriosclerotic cardiovascular disease), status post previous PCI (multiple) for the last one performed in 2013 in [facility].  Lipid Panel    Comprehensive Metabolic Panel   2. Precordial pain  Stress Test With Myocardial Perfusion (1 Day)   3. Dyspnea on exertion (NYHA class II-III)     4. Paroxysmal atrial fibrillation (CMS/[facility])     5. Essential hypertension     6. Chronic anticoagulation with warfarin.     7. Parkinson's disease (CMS/[facility])     8. CKD (chronic kidney disease) stage 3, GFR 30-59 ml/min (CMS/[facility])         Recommendations:    Orders Placed This Encounter   Procedures   • Lipid Panel   • Comprehensive Metabolic Panel   • Stress Test With Myocardial Perfusion (1 Day)   • ECG 12 Lead        1. Continue with Plavix, carvedilol and valsartan for her coronary artery disease.  2. We will check fasting lipid panel and go ahead and add at least a low-dose statin for now.  She stated that she has not had any problems with atorvastatin before.  3. We will evaluate her chest pain and discomfort with the Lexiscan sestamibi study.  4. For her atrial fibrillation, would continue with warfarin for now and have the office staff to check on the cost of the newer anticoagulants and if they are affordable, may consider switching to 1 of these.  I have discussed this with the Ms. Hodgson and she seems agreeable.  5. If the patient has any significant dizziness then will consider event monitor.    Return in about 3 weeks (around 8/27/2019).    As always,   I  appreciate very much the opportunity to participate in the cardiovascular care of your patients. Please do not hesitate to call me with any questions with regards to Rebecca Hodgson's evaluation and management.       With Best Regards,        Salomon España MD, FACC    Dragon disclaimer:  Much of this encounter note is an electronic transcription/translation of spoken language to printed text. The electronic translation of spoken language may permit erroneous, or at times, nonsensical words or phrases to be inadvertently transcribed; Although I have reviewed the note for such errors, some may still exist.           normal...

## 2022-06-23 NOTE — H&P ADULT - PROBLEM SELECTOR PLAN 2
- Found to have WBC 14.51 today and 13.27 two days ago, with normal differential   - Normal ESR, INR. Normal UA  - No fevers in the ED or reported at home   - No symptoms or sings of infection  - Likely reactive   - Monitor and trend WBC, monitor temp, off of Antibiotics for now. - Found to have WBC 14.51 today and 13.27 two days ago, with normal differential   - Normal ESR, INR. Normal UA  - No fevers in the ED or reported at home   - No symptoms or sings of infection  - Likely reactive   - Procalcitonin ordered   - Monitor and trend WBC, monitor temp, off of Antibiotics for now. - Found to have WBC 14.51 today and 13.27 two days ago, with normal differential   - Normal ESR, INR. Normal UA  - No fevers in the ED or reported at home   - No symptoms or sings of infection  - Likely reactive and appears chronic  - Procalcitonin ordered   - Monitor and trend WBC, monitor temp, off of Antibiotics for now.

## 2022-06-23 NOTE — H&P ADULT - PROBLEM SELECTOR PLAN 1
- Pt with severe headache last evening with /105 at home. Non other symptoms concerning for stroke. Pt was in the ED 2 nights ago with the same presentation.   - BP in the triage 190/90, improved to 145/69 after nicardipine drip.   - CT head: No acute intracranial hemorrhage or mass effect. No interval change compared to prior exam from 6/21/2022.  - Normal neuro exam.   - s/p nicardipine 5mg/hr, labetalol 10mg IVP x1, oxycodone 5mg PO x1, Ketorolac 15mg IVP x1, Reglan 10mg IVP x1 in the ED.   - At home on carvedilol 12.5 q12h, lisinopril 40 mg qd. Continue same home meds with hold parameters for now.   - Monitor BP  - Cardiology consult. - Pt with severe headache last evening with /105 at home. Non other symptoms concerning for stroke. Pt was in the ED 2 nights ago with the same presentation.   - BP in the triage 190/90, improved to 145/69 after nicardipine drip.   - CT head: No acute intracranial hemorrhage or mass effect. No interval change compared to prior exam from 6/21/2022.  - Normal neuro exam.   - s/p nicardipine 5mg/hr, labetalol 10mg IVP x1, oxycodone 5mg PO x1, Ketorolac 15mg IVP x1, Reglan 10mg IVP x1 in the ED.   - At home on carvedilol 12.5 q12h, lisinopril 40 mg qd. Continue same home meds with hold parameters for now.   - Monitor BP - can give IV labetalol if BP rebounds up  - Cardiology consult Dr. Garcia

## 2022-06-24 ENCOUNTER — TRANSCRIPTION ENCOUNTER (OUTPATIENT)
Age: 67
End: 2022-06-24

## 2022-06-24 DIAGNOSIS — I16.0 HYPERTENSIVE URGENCY: ICD-10-CM

## 2022-06-24 DIAGNOSIS — Z95.2 PRESENCE OF PROSTHETIC HEART VALVE: Chronic | ICD-10-CM

## 2022-06-24 DIAGNOSIS — Z98.890 OTHER SPECIFIED POSTPROCEDURAL STATES: Chronic | ICD-10-CM

## 2022-06-24 DIAGNOSIS — Z95.2 PRESENCE OF PROSTHETIC HEART VALVE: ICD-10-CM

## 2022-06-24 DIAGNOSIS — Z29.9 ENCOUNTER FOR PROPHYLACTIC MEASURES, UNSPECIFIED: ICD-10-CM

## 2022-06-24 DIAGNOSIS — D72.829 ELEVATED WHITE BLOOD CELL COUNT, UNSPECIFIED: ICD-10-CM

## 2022-06-24 DIAGNOSIS — I25.10 ATHEROSCLEROTIC HEART DISEASE OF NATIVE CORONARY ARTERY WITHOUT ANGINA PECTORIS: ICD-10-CM

## 2022-06-24 DIAGNOSIS — G47.33 OBSTRUCTIVE SLEEP APNEA (ADULT) (PEDIATRIC): ICD-10-CM

## 2022-06-24 LAB
ALBUMIN SERPL ELPH-MCNC: 3.1 G/DL — LOW (ref 3.3–5)
ALP SERPL-CCNC: 59 U/L — SIGNIFICANT CHANGE UP (ref 40–120)
ALT FLD-CCNC: 25 U/L — SIGNIFICANT CHANGE UP (ref 12–78)
ANION GAP SERPL CALC-SCNC: 5 MMOL/L — SIGNIFICANT CHANGE UP (ref 5–17)
AST SERPL-CCNC: 37 U/L — SIGNIFICANT CHANGE UP (ref 15–37)
BASOPHILS # BLD AUTO: 0.05 K/UL — SIGNIFICANT CHANGE UP (ref 0–0.2)
BASOPHILS NFR BLD AUTO: 0.5 % — SIGNIFICANT CHANGE UP (ref 0–2)
BILIRUB SERPL-MCNC: 0.4 MG/DL — SIGNIFICANT CHANGE UP (ref 0.2–1.2)
BUN SERPL-MCNC: 21 MG/DL — SIGNIFICANT CHANGE UP (ref 7–23)
CALCIUM SERPL-MCNC: 8.4 MG/DL — LOW (ref 8.5–10.1)
CHLORIDE SERPL-SCNC: 108 MMOL/L — SIGNIFICANT CHANGE UP (ref 96–108)
CO2 SERPL-SCNC: 26 MMOL/L — SIGNIFICANT CHANGE UP (ref 22–31)
CREAT SERPL-MCNC: 1 MG/DL — SIGNIFICANT CHANGE UP (ref 0.5–1.3)
EGFR: 82 ML/MIN/1.73M2 — SIGNIFICANT CHANGE UP
EOSINOPHIL # BLD AUTO: 0.28 K/UL — SIGNIFICANT CHANGE UP (ref 0–0.5)
EOSINOPHIL NFR BLD AUTO: 2.6 % — SIGNIFICANT CHANGE UP (ref 0–6)
GLUCOSE SERPL-MCNC: 102 MG/DL — HIGH (ref 70–99)
HCT VFR BLD CALC: 37.8 % — LOW (ref 39–50)
HCV AB S/CO SERPL IA: 0.19 S/CO — SIGNIFICANT CHANGE UP (ref 0–0.99)
HCV AB SERPL-IMP: SIGNIFICANT CHANGE UP
HGB BLD-MCNC: 12.1 G/DL — LOW (ref 13–17)
IMM GRANULOCYTES NFR BLD AUTO: 0.4 % — SIGNIFICANT CHANGE UP (ref 0–1.5)
LYMPHOCYTES # BLD AUTO: 2.68 K/UL — SIGNIFICANT CHANGE UP (ref 1–3.3)
LYMPHOCYTES # BLD AUTO: 24.5 % — SIGNIFICANT CHANGE UP (ref 13–44)
MAGNESIUM SERPL-MCNC: 2.3 MG/DL — SIGNIFICANT CHANGE UP (ref 1.6–2.6)
MCHC RBC-ENTMCNC: 26.9 PG — LOW (ref 27–34)
MCHC RBC-ENTMCNC: 32 GM/DL — SIGNIFICANT CHANGE UP (ref 32–36)
MCV RBC AUTO: 84 FL — SIGNIFICANT CHANGE UP (ref 80–100)
MONOCYTES # BLD AUTO: 0.63 K/UL — SIGNIFICANT CHANGE UP (ref 0–0.9)
MONOCYTES NFR BLD AUTO: 5.8 % — SIGNIFICANT CHANGE UP (ref 2–14)
NEUTROPHILS # BLD AUTO: 7.24 K/UL — SIGNIFICANT CHANGE UP (ref 1.8–7.4)
NEUTROPHILS NFR BLD AUTO: 66.2 % — SIGNIFICANT CHANGE UP (ref 43–77)
NRBC # BLD: 0 /100 WBCS — SIGNIFICANT CHANGE UP (ref 0–0)
PHOSPHATE SERPL-MCNC: 4.3 MG/DL — SIGNIFICANT CHANGE UP (ref 2.5–4.5)
PLATELET # BLD AUTO: 191 K/UL — SIGNIFICANT CHANGE UP (ref 150–400)
POTASSIUM SERPL-MCNC: 4.9 MMOL/L — SIGNIFICANT CHANGE UP (ref 3.5–5.3)
POTASSIUM SERPL-SCNC: 4.9 MMOL/L — SIGNIFICANT CHANGE UP (ref 3.5–5.3)
PROCALCITONIN SERPL-MCNC: 0.04 NG/ML — SIGNIFICANT CHANGE UP
PROT SERPL-MCNC: 7.2 G/DL — SIGNIFICANT CHANGE UP (ref 6–8.3)
RBC # BLD: 4.5 M/UL — SIGNIFICANT CHANGE UP (ref 4.2–5.8)
RBC # FLD: 14.6 % — HIGH (ref 10.3–14.5)
SODIUM SERPL-SCNC: 139 MMOL/L — SIGNIFICANT CHANGE UP (ref 135–145)
WBC # BLD: 10.92 K/UL — HIGH (ref 3.8–10.5)
WBC # FLD AUTO: 10.92 K/UL — HIGH (ref 3.8–10.5)

## 2022-06-24 PROCEDURE — 99232 SBSQ HOSP IP/OBS MODERATE 35: CPT | Mod: GC

## 2022-06-24 PROCEDURE — 99223 1ST HOSP IP/OBS HIGH 75: CPT

## 2022-06-24 RX ORDER — AMLODIPINE BESYLATE 2.5 MG/1
2.5 TABLET ORAL DAILY
Refills: 0 | Status: DISCONTINUED | OUTPATIENT
Start: 2022-06-24 | End: 2022-06-25

## 2022-06-24 RX ORDER — AMLODIPINE BESYLATE 2.5 MG/1
1 TABLET ORAL
Qty: 15 | Refills: 0
Start: 2022-06-24 | End: 2022-07-08

## 2022-06-24 RX ORDER — ASPIRIN/CALCIUM CARB/MAGNESIUM 324 MG
325 TABLET ORAL
Qty: 0 | Refills: 0 | DISCHARGE

## 2022-06-24 RX ORDER — CARVEDILOL PHOSPHATE 80 MG/1
12.5 CAPSULE, EXTENDED RELEASE ORAL EVERY 12 HOURS
Refills: 0 | Status: DISCONTINUED | OUTPATIENT
Start: 2022-06-24 | End: 2022-06-25

## 2022-06-24 RX ORDER — AMLODIPINE BESYLATE 2.5 MG/1
2.5 TABLET ORAL ONCE
Refills: 0 | Status: COMPLETED | OUTPATIENT
Start: 2022-06-24 | End: 2022-06-24

## 2022-06-24 RX ORDER — LISINOPRIL 2.5 MG/1
40 TABLET ORAL DAILY
Refills: 0 | Status: DISCONTINUED | OUTPATIENT
Start: 2022-06-24 | End: 2022-06-25

## 2022-06-24 RX ORDER — ATORVASTATIN CALCIUM 80 MG/1
80 TABLET, FILM COATED ORAL AT BEDTIME
Refills: 0 | Status: DISCONTINUED | OUTPATIENT
Start: 2022-06-24 | End: 2022-06-25

## 2022-06-24 RX ORDER — ASPIRIN/CALCIUM CARB/MAGNESIUM 324 MG
325 TABLET ORAL DAILY
Refills: 0 | Status: DISCONTINUED | OUTPATIENT
Start: 2022-06-24 | End: 2022-06-25

## 2022-06-24 RX ADMIN — ATORVASTATIN CALCIUM 80 MILLIGRAM(S): 80 TABLET, FILM COATED ORAL at 21:07

## 2022-06-24 RX ADMIN — Medication 650 MILLIGRAM(S): at 18:29

## 2022-06-24 RX ADMIN — ENOXAPARIN SODIUM 40 MILLIGRAM(S): 100 INJECTION SUBCUTANEOUS at 05:31

## 2022-06-24 RX ADMIN — NICARDIPINE HYDROCHLORIDE 5 MG/HR: 30 CAPSULE, EXTENDED RELEASE ORAL at 02:32

## 2022-06-24 RX ADMIN — CARVEDILOL PHOSPHATE 12.5 MILLIGRAM(S): 80 CAPSULE, EXTENDED RELEASE ORAL at 05:32

## 2022-06-24 RX ADMIN — Medication 650 MILLIGRAM(S): at 12:10

## 2022-06-24 RX ADMIN — AMLODIPINE BESYLATE 2.5 MILLIGRAM(S): 2.5 TABLET ORAL at 13:20

## 2022-06-24 RX ADMIN — Medication 325 MILLIGRAM(S): at 13:14

## 2022-06-24 RX ADMIN — CARVEDILOL PHOSPHATE 12.5 MILLIGRAM(S): 80 CAPSULE, EXTENDED RELEASE ORAL at 18:28

## 2022-06-24 RX ADMIN — LISINOPRIL 40 MILLIGRAM(S): 2.5 TABLET ORAL at 05:34

## 2022-06-24 RX ADMIN — OXYCODONE HYDROCHLORIDE 5 MILLIGRAM(S): 5 TABLET ORAL at 00:23

## 2022-06-24 RX ADMIN — Medication 15 MILLIGRAM(S): at 00:23

## 2022-06-24 RX ADMIN — Medication 650 MILLIGRAM(S): at 05:34

## 2022-06-24 NOTE — PROGRESS NOTE ADULT - PROBLEM SELECTOR PLAN 1
- Pt with severe headache last evening with /105 at home. Non other symptoms concerning for stroke. Pt was in the ED 2 nights ago with the same presentation.   - BP in the triage 190/90, improved to 145/69 after nicardipine drip.   - CT head: No acute intracranial hemorrhage or mass effect. No interval change compared to prior exam from 6/21/2022.  - Normal neuro exam.   - s/p nicardipine 5mg/hr, labetalol 10mg IVP x1 in ED  - At home on carvedilol 12.5 q12h, lisinopril 40 mg qd. Continue same home meds with hold parameters.  - Started amlodipine 2.5 mg daily, gave additional 2.5 mg in afternoon due to BPs elevating up to 170s in afternoon  - Monitor BP - can give IV labetalol if BP rebounds up  - Cardiology consult Dr. Garcia

## 2022-06-24 NOTE — PATIENT PROFILE ADULT - PHONE #
Please call pt to inquire if pt has had a colonoscopy in the last 10 years and if so who performed it (name and phone #). Please let [facility] know so I can obtain the report.     If pt did not have a colonoscopy please advise them we will leave the FIT stoo [phone]

## 2022-06-24 NOTE — DISCHARGE NOTE PROVIDER - NSDCMRMEDTOKEN_GEN_ALL_CORE_FT
acetaminophen: 2 tab(s) orally every 6 hours, As Needed  amLODIPine 2.5 mg oral tablet: 1 tab(s) orally once a day   aspirin 325 mg oral tablet: 325 milligram(s) orally once a day  Coreg 12.5 mg oral tablet: 1 tab(s) orally 2 times a day   Crestor 20 mg oral tablet: 1 tab(s) orally once a day (at bedtime)  lisinopril 40 mg oral tablet: 1 tab(s) orally once a day  multivitamin:   once a day   acetaminophen: 2 tab(s) orally every 6 hours, As Needed  amLODIPine 5 mg oral tablet: 1 tab(s) orally once a day  aspirin 325 mg oral tablet: 325 milligram(s) orally once a day  carvedilol 25 mg oral tablet: 1 tab(s) orally every 12 hours  Crestor 20 mg oral tablet: 1 tab(s) orally once a day (at bedtime)  lisinopril 40 mg oral tablet: 1 tab(s) orally once a day  multivitamin:   once a day

## 2022-06-24 NOTE — DISCHARGE NOTE PROVIDER - CARE PROVIDERS DIRECT ADDRESSES
,DirectAddress_Unknown,[email or web address]

## 2022-06-24 NOTE — CONSULT NOTE ADULT - ASSESSMENT
67 year old male with PMH of HTN, HLD, nonobstructive CAD on cardiac cath (EF 51% intraoperatively), severe AS s/p bio AVR, prediabetes, obesity, WAQAS, presented to the ED with elevated blood pressure and severe headache, admitted for hypertensive urgency, cardiology consulted.     -EKG NSR rate 68, LAD, no significant change from June 21 2022, no signs of active ischemia  -Trop neg x1  -s/p Cardene gtt, labetalol 10mg IVP x1 with improvement in BP  -Etiology of repeated hypertensive episodes unclear, continue PO lisinopril, increase Coreg to 25mg BID  -F/u TTE  - Monitor and replete lytes, keep K>4, Mg>2.    - Other cardiovascular workup will depend on clinical course.  - All other workup per primary team.  - Will continue to follow.             67 year old male with PMH of HTN, HLD, nonobstructive CAD on cardiac cath (EF 51% intraoperatively), severe AS s/p bio AVR, prediabetes, obesity, WAQAS, presented to the ED with elevated blood pressure and severe headache, admitted for hypertensive urgency, cardiology consulted.     -EKG NSR rate 68, LAD, no significant change from June 21 2022, no signs of active ischemia  -Trop neg x1  -s/p Cardene gtt, labetalol 10mg IVP x1 with improvement in BP  -Hypertensive episodes likely attributed to increased salt intake and poor diet, patient educated on importance of home cooked meals and low sodium intake  -Continue PO lisinopril, Coreg 12.5mg BID, can add low dose amlodipine for optimal BP control  -Monitor routine hemodynamics  -Monitor and replete lytes, keep K>4, Mg>2.    - Other cardiovascular workup will depend on clinical course.  - All other workup per primary team.  - Will continue to follow.             67 year old male with PMH of HTN, HLD, nonobstructive CAD on cardiac cath (EF 51% intraoperatively), severe AS s/p bio AVR, prediabetes, obesity, WAQAS, presented to the ED with elevated blood pressure and severe headache, admitted for hypertensive urgency, cardiology consulted.     - EKG NSR rate 68, LAD, no significant change from June 21 2022, no signs of active ischemia  - Trop neg x1  - s/p Cardene gtt, labetalol 10mg IVP x1 with improvement in BP  - Hypertensive episodes likely attributed to increased salt intake and poor diet, patient educated on importance of home cooked meals and low sodium intake  - Continue PO lisinopril, Coreg 12.5mg BID, can add low dose amlodipine for optimal BP control  - Monitor routine hemodynamics  - Monitor and replete lytes, keep K>4, Mg>2.    - Other cardiovascular workup will depend on clinical course.  - All other workup per primary team.  - Will continue to follow.

## 2022-06-24 NOTE — DISCHARGE NOTE PROVIDER - PROVIDER TOKENS
PROVIDER:[TOKEN:[[id number]],FOLLOWUP:[1 week]],PROVIDER:[TOKEN:[[id number]],FOLLOWUP:[1 week]]

## 2022-06-24 NOTE — DISCHARGE NOTE PROVIDER - NSDCFUSCHEDAPPT_GEN_ALL_CORE_FT
Davion Gill  [facility] Physician [facility]  CARDIOLOGY [address]  Scheduled Appointment: 07/12/2022    Micah Cameron  [facility] Physician [facility]  CTSURG [address]  Scheduled Appointment: 08/18/2022

## 2022-06-24 NOTE — CONSULT NOTE ADULT - ATTENDING COMMENTS
HA and elevated BP in the setting of significant dietary indiscretion  cont coreg 12.5 bid and lisinopril.  add amlodipine 2.5 mg po daily  trend BP  no sign of acute ischemia or volume overload  would plan for dc in next 24 hours

## 2022-06-24 NOTE — PROGRESS NOTE ADULT - SUBJECTIVE AND OBJECTIVE BOX
Patient is a 67y old  Male who presents with a chief complaint of Hypertensive urgency (2022 12:42)    INTERVAL HPI/OVERNIGHT EVENTS: No acute overnight events. Pt seen and examined at the bedside this AM. Pt reports persistent headache located frontal and retro-orbital, but notes resolution of visual changes. Pt has no other complaints or concerns at this time, denies, chest pain, palpitations, SOB, back or flank pain, urinary changes.     MEDICATIONS  (STANDING):  amLODIPine   Tablet 2.5 milliGRAM(s) Oral daily  amLODIPine   Tablet 2.5 milliGRAM(s) Oral once  aspirin 325 milliGRAM(s) Oral daily  atorvastatin 80 milliGRAM(s) Oral at bedtime  carvedilol 12.5 milliGRAM(s) Oral every 12 hours  enoxaparin Injectable 40 milliGRAM(s) SubCutaneous every 24 hours  lisinopril 40 milliGRAM(s) Oral daily    MEDICATIONS  (PRN):  acetaminophen     Tablet .. 650 milliGRAM(s) Oral every 6 hours PRN Temp greater or equal to 38C (100.4F), Mild Pain (1 - 3)  melatonin 3 milliGRAM(s) Oral at bedtime PRN Insomnia  ondansetron Injectable 4 milliGRAM(s) IV Push every 8 hours PRN Nausea and/or Vomiting    Allergies  hydrALAZINE (Rash)  penicillin (Urticaria; Rash)  pollen itchy eye (Eye Irritation)  sulfa drugs (Urticaria; Rash)    Intolerances    REVIEW OF SYSTEMS:  CONSTITUTIONAL: No fever or chills  HEENT:  No sore throat; + Headache  RESPIRATORY: No cough, wheezing, or shortness of breath  CARDIOVASCULAR: No chest pain, palpitations  GASTROINTESTINAL: No abd pain, nausea, vomiting, or diarrhea  GENITOURINARY: No dysuria, frequency, or hematuria  NEUROLOGICAL: no focal weakness or dizziness, no vision changes  MUSCULOSKELETAL: no myalgias     Vital Signs Last 24 Hrs  T(C): 36.7 (2022 13:19), Max: 36.8 (2022 00:22)  T(F): 98 (2022 13:19), Max: 98.2 (2022 00:22)  HR: 74 (2022 13:19) (69 - 90)  BP: 143/69 (2022 13:19) (136/63 - 210/96)  RR: 20 (2022 13:19) (16 - 20)  SpO2: 98% (2022 13:19) (95% - 99%)    PHYSICAL EXAM:  GENERAL: NAD  HEENT:  anicteric, moist mucous membranes  CHEST/LUNG:  CTA b/l, no rales, wheezes, or rhonchi  HEART:  RRR, S1, S2  ABDOMEN:  BS+, soft, nontender, nondistended  EXTREMITIES: no edema, cyanosis, or calf tenderness  NERVOUS SYSTEM: answers questions and follows commands appropriately    LABS:                        12.1   10.92 )-----------( 191      ( 2022 04:26 )             37.8     CBC Full  -  ( 2022 04:26 )  WBC Count : 10.92 K/uL  Hemoglobin : 12.1 g/dL  Hematocrit : 37.8 %  Platelet Count - Automated : 191 K/uL  Mean Cell Volume : 84.0 fl  Mean Cell Hemoglobin : 26.9 pg  Mean Cell Hemoglobin Concentration : 32.0 gm/dL  Auto Neutrophil # : 7.24 K/uL  Auto Lymphocyte # : 2.68 K/uL  Auto Monocyte # : 0.63 K/uL  Auto Eosinophil # : 0.28 K/uL  Auto Basophil # : 0.05 K/uL  Auto Neutrophil % : 66.2 %  Auto Lymphocyte % : 24.5 %  Auto Monocyte % : 5.8 %  Auto Eosinophil % : 2.6 %  Auto Basophil % : 0.5 %    2022 04:26    139    |  108    |  21     ----------------------------<  102    4.9     |  26     |  1.00     Ca    8.4        2022 04:26  Phos  4.3       2022 04:26  Mg     2.3       2022 04:26    TPro  7.2    /  Alb  3.1    /  TBili  0.4    /  DBili  x      /  AST  37     /  ALT  25     /  AlkPhos  59     2022 04:26    PT/INR - ( 2022 21:23 )   PT: 11.8 sec;   INR: 1.01 ratio    PTT - ( 2022 21:23 )  PTT:29.5 sec    Urinalysis Basic - ( 2022 23:16 )  Color: Yellow / Appearance: Clear / S.010 / pH: x  Gluc: x / Ketone: Negative  / Bili: Negative / Urobili: Negative   Blood: x / Protein: Negative / Nitrite: Negative   Leuk Esterase: Negative / RBC: x / WBC x   Sq Epi: x / Non Sq Epi: x / Bacteria: x    RADIOLOGY & ADDITIONAL TESTS:  Personally reviewed.     Consultant(s) Notes Reviewed:  [x] YES  [ ] NO Patient is a 67y old  Male who presents with a chief complaint of Hypertensive urgency (2022 12:42)    INTERVAL HPI/OVERNIGHT EVENTS: No acute overnight events. Pt seen and examined at the bedside this AM. Pt reports persistent headache located frontal and retro-orbital, but notes resolution of visual changes. Pt has no other complaints or concerns at this time, denies, chest pain, palpitations, SOB, back or flank pain, urinary changes.     MEDICATIONS  (STANDING):  amLODIPine   Tablet 2.5 milliGRAM(s) Oral daily  amLODIPine   Tablet 2.5 milliGRAM(s) Oral once  aspirin 325 milliGRAM(s) Oral daily  atorvastatin 80 milliGRAM(s) Oral at bedtime  carvedilol 12.5 milliGRAM(s) Oral every 12 hours  enoxaparin Injectable 40 milliGRAM(s) SubCutaneous every 24 hours  lisinopril 40 milliGRAM(s) Oral daily    MEDICATIONS  (PRN):  acetaminophen     Tablet .. 650 milliGRAM(s) Oral every 6 hours PRN Temp greater or equal to 38C (100.4F), Mild Pain (1 - 3)  melatonin 3 milliGRAM(s) Oral at bedtime PRN Insomnia  ondansetron Injectable 4 milliGRAM(s) IV Push every 8 hours PRN Nausea and/or Vomiting    Allergies  hydrALAZINE (Rash)  penicillin (Urticaria; Rash)  pollen itchy eye (Eye Irritation)  sulfa drugs (Urticaria; Rash)    Intolerances    REVIEW OF SYSTEMS:  CONSTITUTIONAL: No fever or chills  HEENT:  No sore throat; + Headache  RESPIRATORY: No cough, wheezing, or shortness of breath  CARDIOVASCULAR: No chest pain, palpitations  GASTROINTESTINAL: No abd pain, nausea, vomiting, or diarrhea  GENITOURINARY: No dysuria, frequency, or hematuria  NEUROLOGICAL: no focal weakness or dizziness, no vision changes  MUSCULOSKELETAL: no myalgias or arthralgias    Vital Signs Last 24 Hrs  T(C): 36.7 (2022 13:19), Max: 36.8 (2022 00:22)  T(F): 98 (2022 13:19), Max: 98.2 (2022 00:22)  HR: 74 (2022 13:19) (69 - 90)  BP: 143/69 (2022 13:19) (136/63 - 210/96)  RR: 20 (2022 13:19) (16 - 20)  SpO2: 98% (2022 13:19) (95% - 99%)    PHYSICAL EXAM:  GENERAL: NAD,   HEENT:  anicteric, moist mucous membranes  CHEST/LUNG:  CTA b/l, no rales, wheezes, or rhonchi  HEART:  RRR, S1, S2  ABDOMEN:  BS+, soft, nontender, nondistended  EXTREMITIES: no edema, cyanosis, or calf tenderness  NERVOUS SYSTEM: answers questions and follows commands appropriately    LABS:                        12.1   10.92 )-----------( 191      ( 2022 04:26 )             37.8     CBC Full  -  ( 2022 04:26 )  WBC Count : 10.92 K/uL  Hemoglobin : 12.1 g/dL  Hematocrit : 37.8 %  Platelet Count - Automated : 191 K/uL  Mean Cell Volume : 84.0 fl  Mean Cell Hemoglobin : 26.9 pg  Mean Cell Hemoglobin Concentration : 32.0 gm/dL  Auto Neutrophil # : 7.24 K/uL  Auto Lymphocyte # : 2.68 K/uL  Auto Monocyte # : 0.63 K/uL  Auto Eosinophil # : 0.28 K/uL  Auto Basophil # : 0.05 K/uL  Auto Neutrophil % : 66.2 %  Auto Lymphocyte % : 24.5 %  Auto Monocyte % : 5.8 %  Auto Eosinophil % : 2.6 %  Auto Basophil % : 0.5 %    2022 04:26    139    |  108    |  21     ----------------------------<  102    4.9     |  26     |  1.00     Ca    8.4        2022 04:26  Phos  4.3       2022 04:26  Mg     2.3       2022 04:26    TPro  7.2    /  Alb  3.1    /  TBili  0.4    /  DBili  x      /  AST  37     /  ALT  25     /  AlkPhos  59     2022 04:26    PT/INR - ( 2022 21:23 )   PT: 11.8 sec;   INR: 1.01 ratio    PTT - ( 2022 21:23 )  PTT:29.5 sec    Urinalysis Basic - ( 2022 23:16 )  Color: Yellow / Appearance: Clear / S.010 / pH: x  Gluc: x / Ketone: Negative  / Bili: Negative / Urobili: Negative   Blood: x / Protein: Negative / Nitrite: Negative   Leuk Esterase: Negative / RBC: x / WBC x   Sq Epi: x / Non Sq Epi: x / Bacteria: x    RADIOLOGY & ADDITIONAL TESTS:  Personally reviewed.     Consultant(s) Notes Reviewed:  [x] YES  [ ] NO

## 2022-06-24 NOTE — DISCHARGE NOTE PROVIDER - HOSPITAL COURSE
FROM ADMISSION H+P:   HPI:  Patient is a 67 year old male with PMH of HTN, HLD, nonobstructive CAD on cardiac cath (EF 51% intraoperatively), severe AS s/p bio AVR, prediabetes, obesity, WAQAS, presented to the ED with his wife complaining of elevated blood pressure and severe headache. The headache started late in the afternoon initially mild and SBP was 170's, then the headache was severe with /105 at home and came to the ED. He describes the headache as the worst of his life, located on the top of his head and behind his eyes. He was started on a nicardipine drip in the ED and his headache only mildly improved. His blood pressure did improve with the nicardipine infusion, so this was stopped and he was given a push of intravenous labetalol. Denies chest pain, palpitations, dyspnea, dizziness, numbness, tingling, focal weakness, speech changes, abdominal pain, n/v/d.      *Pt was in the ED 2 nights ago for the same reason, BP improved, CT head was negative and was DC with changes in his BP meds. Metoprolol was changed to carvedilol.    ED course:   - Vitals: /90 > 145/69, HR 69, RR 16, temp 98.1, O2 Sat 99% at RA.   - Labs: WBC 14.51, Hb 12.4. INR 1.01.  Normal Cr, lytes.  Normal troponin and CKMB.  pBNP 542.    Normal UA. Negative covid PCR.  - CT head: No acute intracranial hemorrhage or mass effect. No interval change compared to prior exam from 6/21/2022.  - EKG: NSR, 68 bpm. Left axis deviation. Suggest LVH.  Nonspecific T abnormality.  Pending official reading.   - s/p nicardipine 5mg/hr, labetalol 10mg IVP x1, oxycodone 5mg PO x1, Ketorolac 15mg IVP x1, Reglan 10mg IVP x1.  (23 Jun 2022 23:32)    ---  HOSPITAL COURSE: Pt was admitted for hypertensive urgency, and was treated in the ED with a nicardipine drip and IVP labetalol x 1, and was then resumed on his home anti-HTN medications. Cardiology was consulted, and recommended adding amlodipine 2.5 mg daily to his home medication regimen, and to discharge home with close outpatient follow-up with Dr. Gill. Pt's symptoms improved, and his BPs remained in an acceptable range.     Patient was medically optimized and improved clinically throughout hospital course. Patient seen and examined on day of discharge.    Vital Signs Last 24 Hrs  T(C): 36.7 (24 Jun 2022 05:24), Max: 36.8 (24 Jun 2022 00:22)  T(F): 98.1 (24 Jun 2022 05:24), Max: 98.2 (24 Jun 2022 00:22)  HR: 69 (24 Jun 2022 05:24) (69 - 90)  BP: 136/63 (24 Jun 2022 05:24) (136/63 - 210/96)  RR: 18 (24 Jun 2022 05:24) (16 - 18)  SpO2: 95% (24 Jun 2022 05:24) (95% - 99%)    Physical Exam:  General: Well developed, well nourished, in no acute distress  HEENT: NCAT, PERRLA, EOMI bl, moist mucous membranes   Neck: Supple, nontender, no mass  Neurology: A&Ox3, nonfocal, CN II-XII grossly intact, sensation intact, no gait abnormalities   Respiratory: CTA B/L, No wheezing, rales, or rhonchi  CV: RRR, S1/S2 present, no murmurs, rubs, or gallops  Abdominal: Soft, nontender, non-distended, normoactive bowel sounds  Extremities: No C/C/E, peripheral pulses present  MSK: Normal ROM, no joint erythema or warmth, no joint swelling   Skin: warm, dry, normal color, no obvious rash or abnormal lesions    Patient is medically stable for discharge to home with outpatient follow up.  ---  CONSULTANTS:   Cardiology: Dr. Garcia    ---  TIME SPENT:  I, the attending physician, was physically present for the key portions of the evaluation and management (E/M) service provided. The total amount of time spent reviewing the hospital notes, laboratory values, imaging findings, assessing/counseling the patient, discussing with consultant physicians, social work, nursing staff was -- minutes    ---  Primary care provider was made aware of plan for discharge:      [  ] NO     [  ] YES   FROM ADMISSION H+P:   HPI:  Patient is a 67 year old male with PMH of HTN, HLD, nonobstructive CAD on cardiac cath (EF 51% intraoperatively), severe AS s/p bio AVR, prediabetes, obesity, WAQAS, presented to the ED with his wife complaining of elevated blood pressure and severe headache. The headache started late in the afternoon initially mild and SBP was 170's, then the headache was severe with /105 at home and came to the ED. He describes the headache as the worst of his life, located on the top of his head and behind his eyes. He was started on a nicardipine drip in the ED and his headache only mildly improved. His blood pressure did improve with the nicardipine infusion, so this was stopped and he was given a push of intravenous labetalol.     ---  HOSPITAL COURSE:   Pt was admitted for hypertensive urgency and was treated in the ED with a nicardipine drip and IVP labetalol x 1. BP meds increased as indicated. Cardiology was consulted, and recommended adding amlodipine 5 mg daily to his home medication regimen, and to discharge home with close outpatient follow-up with Dr. Gill. Pt's symptoms improved, and his BPs remained in an acceptable range.   Patient was medically optimized and improved clinically throughout hospital course. Patient seen and examined on day of discharge.    ---  CONSULTANTS:   Cardiology: Dr. Garcia

## 2022-06-24 NOTE — PATIENT PROFILE ADULT - FALL HARM RISK - UNIVERSAL INTERVENTIONS
Bed in lowest position, wheels locked, appropriate side rails in place/Call bell, personal items and telephone in reach/Instruct patient to call for assistance before getting out of bed or chair/Non-slip footwear when patient is out of bed/[place] to call system/Physically safe environment - no spills, clutter or unnecessary equipment/Purposeful Proactive Rounding/Room/bathroom lighting operational, light cord in reach

## 2022-06-24 NOTE — DISCHARGE NOTE PROVIDER - NSDCCPCAREPLAN_GEN_ALL_CORE_FT
PRINCIPAL DISCHARGE DIAGNOSIS  Diagnosis: Hypertensive urgency  Assessment and Plan of Treatment: You were admitted to the hospital due to a dangerously elevated blood pressure. You were given IV medications to lower your blood pressure, and were then resumed on your home blood pressure medications once your pressure stabilized. On discharge:  - CONTINUE taking carvedilol 12.5 mg twice a day and lisinopril 40 mg daily  - START taking amlodipine 2.5 mg daily  - Follow up with your PCP and cardiologist within 1 week of discharge for further monitoring and recommendations  - If you have a blood pressure cuff at home, log your blood pressure readings and bring them to your cardiologist  - AVOID high sodium and processed foods. Try to eat home-cooked foods that are low in sodium to avoid diet-related elevations in blood pressure.  - If you have new headaches or vision changes, chest pain or shortness of breath with elevated blood pressures (greated than 180 systolic or 120 diastolic), then return to the ED for further evaluation       PRINCIPAL DISCHARGE DIAGNOSIS  Diagnosis: Hypertensive urgency  Assessment and Plan of Treatment: You were admitted to the hospital due to a dangerously elevated blood pressure. You were given IV medications to lower your blood pressure, and were then resumed on your home blood pressure medications once your pressure stabilized. On discharge:  - meds were adjusted and sent to your pharmacy  - Follow up with your PCP and cardiologist within 1 week of discharge for further monitoring and recommendations  - If you have a blood pressure cuff at home, log your blood pressure readings and bring them to your cardiologist  - AVOID high sodium and processed foods. Try to eat home-cooked foods that are low in sodium to avoid diet-related elevations in blood pressure.  - If you develop symptoms, recommend seeking the recommendations of a physician.

## 2022-06-24 NOTE — PROGRESS NOTE ADULT - PROBLEM SELECTOR PLAN 2
- Found to have WBC 14.51 today and 13.27 two days ago, with normal differential   - Normal ESR, INR. Normal UA  - No fevers in the ED or reported at home   - No symptoms or sings of infection  - Likely reactive and appears chronic  - Procalcitonin ordered   - Monitor and trend WBC, monitor temp, off of Antibiotics for now.

## 2022-06-24 NOTE — DISCHARGE NOTE PROVIDER - CARE PROVIDER_API CALL
Micah Collins)  [facility] [address] [facility]  [address] [place] [address]  Phone: [phone]  Fax: [phone]  Follow Up Time: 1 week    Davion Gill)  [facility]  [address] [place] [address]  Phone: [phone]  Fax: [phone]  Follow Up Time: 1 week

## 2022-06-25 ENCOUNTER — TRANSCRIPTION ENCOUNTER (OUTPATIENT)
Age: 67
End: 2022-06-25

## 2022-06-25 VITALS
DIASTOLIC BLOOD PRESSURE: 82 MMHG | RESPIRATION RATE: 19 BRPM | SYSTOLIC BLOOD PRESSURE: 170 MMHG | OXYGEN SATURATION: 95 % | HEART RATE: 68 BPM | TEMPERATURE: 98 F

## 2022-06-25 LAB
ANION GAP SERPL CALC-SCNC: 4 MMOL/L — LOW (ref 5–17)
BUN SERPL-MCNC: 16 MG/DL — SIGNIFICANT CHANGE UP (ref 7–23)
CALCIUM SERPL-MCNC: 8.9 MG/DL — SIGNIFICANT CHANGE UP (ref 8.5–10.1)
CHLORIDE SERPL-SCNC: 108 MMOL/L — SIGNIFICANT CHANGE UP (ref 96–108)
CO2 SERPL-SCNC: 29 MMOL/L — SIGNIFICANT CHANGE UP (ref 22–31)
CREAT SERPL-MCNC: 0.88 MG/DL — SIGNIFICANT CHANGE UP (ref 0.5–1.3)
EGFR: 94 ML/MIN/1.73M2 — SIGNIFICANT CHANGE UP
GLUCOSE SERPL-MCNC: 101 MG/DL — HIGH (ref 70–99)
HCT VFR BLD CALC: 39.8 % — SIGNIFICANT CHANGE UP (ref 39–50)
HGB BLD-MCNC: 12.8 G/DL — LOW (ref 13–17)
MAGNESIUM SERPL-MCNC: 2.4 MG/DL — SIGNIFICANT CHANGE UP (ref 1.6–2.6)
MCHC RBC-ENTMCNC: 27 PG — SIGNIFICANT CHANGE UP (ref 27–34)
MCHC RBC-ENTMCNC: 32.2 GM/DL — SIGNIFICANT CHANGE UP (ref 32–36)
MCV RBC AUTO: 84 FL — SIGNIFICANT CHANGE UP (ref 80–100)
NRBC # BLD: 0 /100 WBCS — SIGNIFICANT CHANGE UP (ref 0–0)
PHOSPHATE SERPL-MCNC: 3.6 MG/DL — SIGNIFICANT CHANGE UP (ref 2.5–4.5)
PLATELET # BLD AUTO: 195 K/UL — SIGNIFICANT CHANGE UP (ref 150–400)
POTASSIUM SERPL-MCNC: 4.2 MMOL/L — SIGNIFICANT CHANGE UP (ref 3.5–5.3)
POTASSIUM SERPL-SCNC: 4.2 MMOL/L — SIGNIFICANT CHANGE UP (ref 3.5–5.3)
RBC # BLD: 4.74 M/UL — SIGNIFICANT CHANGE UP (ref 4.2–5.8)
RBC # FLD: 14.6 % — HIGH (ref 10.3–14.5)
SODIUM SERPL-SCNC: 141 MMOL/L — SIGNIFICANT CHANGE UP (ref 135–145)
WBC # BLD: 9.13 K/UL — SIGNIFICANT CHANGE UP (ref 3.8–10.5)
WBC # FLD AUTO: 9.13 K/UL — SIGNIFICANT CHANGE UP (ref 3.8–10.5)

## 2022-06-25 PROCEDURE — 96365 THER/PROPH/DIAG IV INF INIT: CPT

## 2022-06-25 PROCEDURE — 85027 COMPLETE CBC AUTOMATED: CPT

## 2022-06-25 PROCEDURE — U0003: CPT

## 2022-06-25 PROCEDURE — 84484 ASSAY OF TROPONIN QUANT: CPT

## 2022-06-25 PROCEDURE — 83880 ASSAY OF NATRIURETIC PEPTIDE: CPT

## 2022-06-25 PROCEDURE — 70450 CT HEAD/BRAIN W/O DYE: CPT | Mod: MA

## 2022-06-25 PROCEDURE — 99232 SBSQ HOSP IP/OBS MODERATE 35: CPT

## 2022-06-25 PROCEDURE — 86803 HEPATITIS C AB TEST: CPT

## 2022-06-25 PROCEDURE — 84145 PROCALCITONIN (PCT): CPT

## 2022-06-25 PROCEDURE — 87635 SARS-COV-2 COVID-19 AMP PRB: CPT

## 2022-06-25 PROCEDURE — 82553 CREATINE MB FRACTION: CPT

## 2022-06-25 PROCEDURE — 85610 PROTHROMBIN TIME: CPT

## 2022-06-25 PROCEDURE — 99239 HOSP IP/OBS DSCHRG MGMT >30: CPT

## 2022-06-25 PROCEDURE — 80048 BASIC METABOLIC PNL TOTAL CA: CPT

## 2022-06-25 PROCEDURE — 99285 EMERGENCY DEPT VISIT HI MDM: CPT | Mod: 25

## 2022-06-25 PROCEDURE — 85025 COMPLETE CBC W/AUTO DIFF WBC: CPT

## 2022-06-25 PROCEDURE — 96376 TX/PRO/DX INJ SAME DRUG ADON: CPT

## 2022-06-25 PROCEDURE — 83735 ASSAY OF MAGNESIUM: CPT

## 2022-06-25 PROCEDURE — 36415 COLL VENOUS BLD VENIPUNCTURE: CPT

## 2022-06-25 PROCEDURE — 85652 RBC SED RATE AUTOMATED: CPT

## 2022-06-25 PROCEDURE — 80053 COMPREHEN METABOLIC PANEL: CPT

## 2022-06-25 PROCEDURE — 93005 ELECTROCARDIOGRAM TRACING: CPT

## 2022-06-25 PROCEDURE — 94660 CPAP INITIATION&MGMT: CPT

## 2022-06-25 PROCEDURE — 96374 THER/PROPH/DIAG INJ IV PUSH: CPT

## 2022-06-25 PROCEDURE — 71046 X-RAY EXAM CHEST 2 VIEWS: CPT

## 2022-06-25 PROCEDURE — 84100 ASSAY OF PHOSPHORUS: CPT

## 2022-06-25 PROCEDURE — 96375 TX/PRO/DX INJ NEW DRUG ADDON: CPT

## 2022-06-25 PROCEDURE — 93306 TTE W/DOPPLER COMPLETE: CPT | Mod: 26

## 2022-06-25 PROCEDURE — 93306 TTE W/DOPPLER COMPLETE: CPT

## 2022-06-25 PROCEDURE — U0005: CPT

## 2022-06-25 PROCEDURE — 85730 THROMBOPLASTIN TIME PARTIAL: CPT

## 2022-06-25 PROCEDURE — 81003 URINALYSIS AUTO W/O SCOPE: CPT

## 2022-06-25 RX ORDER — CARVEDILOL PHOSPHATE 80 MG/1
1 CAPSULE, EXTENDED RELEASE ORAL
Qty: 60 | Refills: 0
Start: 2022-06-25 | End: 2022-07-24

## 2022-06-25 RX ORDER — CARVEDILOL PHOSPHATE 80 MG/1
25 CAPSULE, EXTENDED RELEASE ORAL EVERY 12 HOURS
Refills: 0 | Status: DISCONTINUED | OUTPATIENT
Start: 2022-06-25 | End: 2022-06-25

## 2022-06-25 RX ORDER — ACETAMINOPHEN 500 MG
1000 TABLET ORAL ONCE
Refills: 0 | Status: COMPLETED | OUTPATIENT
Start: 2022-06-25 | End: 2022-06-25

## 2022-06-25 RX ORDER — AMLODIPINE BESYLATE 2.5 MG/1
5 TABLET ORAL DAILY
Refills: 0 | Status: DISCONTINUED | OUTPATIENT
Start: 2022-06-26 | End: 2022-06-25

## 2022-06-25 RX ORDER — LISINOPRIL 2.5 MG/1
1 TABLET ORAL
Qty: 30 | Refills: 0
Start: 2022-06-25 | End: 2022-07-24

## 2022-06-25 RX ORDER — AMLODIPINE BESYLATE 2.5 MG/1
2.5 TABLET ORAL ONCE
Refills: 0 | Status: COMPLETED | OUTPATIENT
Start: 2022-06-25 | End: 2022-06-25

## 2022-06-25 RX ORDER — AMLODIPINE BESYLATE 2.5 MG/1
1 TABLET ORAL
Qty: 30 | Refills: 0
Start: 2022-06-25 | End: 2022-07-24

## 2022-06-25 RX ORDER — LISINOPRIL 2.5 MG/1
1 TABLET ORAL
Qty: 0 | Refills: 0 | DISCHARGE

## 2022-06-25 RX ADMIN — CARVEDILOL PHOSPHATE 25 MILLIGRAM(S): 80 CAPSULE, EXTENDED RELEASE ORAL at 17:22

## 2022-06-25 RX ADMIN — Medication 1000 MILLIGRAM(S): at 17:18

## 2022-06-25 RX ADMIN — LISINOPRIL 40 MILLIGRAM(S): 2.5 TABLET ORAL at 05:55

## 2022-06-25 RX ADMIN — AMLODIPINE BESYLATE 2.5 MILLIGRAM(S): 2.5 TABLET ORAL at 10:00

## 2022-06-25 RX ADMIN — AMLODIPINE BESYLATE 2.5 MILLIGRAM(S): 2.5 TABLET ORAL at 05:54

## 2022-06-25 RX ADMIN — ENOXAPARIN SODIUM 40 MILLIGRAM(S): 100 INJECTION SUBCUTANEOUS at 05:54

## 2022-06-25 RX ADMIN — Medication 1000 MILLIGRAM(S): at 17:53

## 2022-06-25 RX ADMIN — CARVEDILOL PHOSPHATE 12.5 MILLIGRAM(S): 80 CAPSULE, EXTENDED RELEASE ORAL at 05:54

## 2022-06-25 RX ADMIN — Medication 325 MILLIGRAM(S): at 11:30

## 2022-06-25 NOTE — PROGRESS NOTE ADULT - TIME BILLING
above diagnoses as reviewed in the body of the note and the supplemental attestation authored by the attending physician
above diagnoses as reviewed in the body of the note and the supplemental attestation authored by the attending physician

## 2022-06-25 NOTE — PROGRESS NOTE ADULT - PROBLEM SELECTOR PLAN 1
- Pt with severe headache last evening with /105 at home. Non other symptoms concerning for stroke. Pt was in the ED 2 nights ago with the same presentation.   - BP in the triage 190/90, improved to 145/69 after nicardipine drip.   - CT head negative, neuro exam wnl  - s/p nicardipine 5mg/hr, labetalol 10mg IVP x1 in ED  - At home on carvedilol 12.5 q12h, lisinopril 40 mg qd.   - Increased amlodipine to 5 mg daily, increased Coreg to 25 mg BID, continue lisinopril at 40 mg daily, all with hold parameters  - Monitor BP - can give IV labetalol if BP rebounds up or is uncontrolled  - Cardiology consult Dr. Garcia - Pt with severe headache last evening with /105 at home. Non other symptoms concerning for stroke. Pt was in the ED 2 nights ago with the same presentation.   - BP in the triage 190/90, improved to 145/69 after nicardipine drip.   - CT head negative, neuro exam wnl  - s/p nicardipine 5mg/hr, labetalol 10mg IVP x1 in ED  - At home on carvedilol 12.5 q12h, lisinopril 40 mg qd.   - Increased amlodipine to 5 mg daily, increased Coreg to 25 mg BID, continue lisinopril at 40 mg daily, all with hold parameters  - Monitor BP - can give IV labetalol if BP rebounds up or is uncontrolled  - Cardiology consult Dr. Garcia  - F/u TTE - CT head negative, neuro exam wnl  - s/p nicardipine 5mg/hr, labetalol 10mg IVP x1 in ED  - At home on carvedilol 12.5 q12h, lisinopril 40 mg qd.   - Increased amlodipine to 5 mg daily, increased Coreg to 25 mg BID, continue lisinopril at 40 mg daily, all with hold parameters  - Monitor BP - can give IV labetalol if BP rebounds up or is uncontrolled  - Cardiology consult Dr. Garcia  - TTE without acute findings

## 2022-06-25 NOTE — PROGRESS NOTE ADULT - PROBLEM SELECTOR PLAN 4
- Angio cath performed before AVR with non-obstructive CAD.   - Currently no chest pain.   - Negative troponin and CKMB  - EKG: NSR. Left axis deviation. Suggest LVH.  Nonspecific T abnormality.  Pending official reading.  - A1c 5.8 (5/22).  Lipid profile with normal chol 154, LDL 99. Low HDL 37.  - Continue home meds: statin, ASA, carvedilol.    - Monitor on telemetry
- Angio cath performed before AVR with non-obstructive CAD.   - Currently no chest pain.   - Negative troponin and CKMB  - EKG: NSR. Left axis deviation. Suggest LVH.  Nonspecific T abnormality.  Pending official reading.  - A1c 5.8 (5/22).  Lipid profile with normal chol 154, LDL 99. Low HDL 37.  - Continue home meds: statin, ASA, carvedilol.    - Monitor on telemetry

## 2022-06-25 NOTE — CHART NOTE - NSCHARTNOTEFT_GEN_A_CORE
The patient was seen and examined on the day of discharge by the attending physician. Pt stable for discharge. Please see discharge note for additional information regarding the hospital course and the day of discharge.     Checked manual BP at bedside 148/88 currently. no symptoms reported. Provided counseling to pt and spouse at [facility]. All in agreement w/ dc plan this evening. F/u in 1 week.

## 2022-06-25 NOTE — PROGRESS NOTE ADULT - SUBJECTIVE AND OBJECTIVE BOX
Patient is a 67y old  Male who presents with a chief complaint of Hypertensive urgency (2022 10:11)    INTERVAL HPI/OVERNIGHT EVENTS: No acute overnight events. Pt seen and examined at the bedside this AM. Pt reports improvement in his headache, it is now rated as a 2/10. AM BP was elevated to systolic 174. Pt is concerned about gettign discharged too early and having to come back due to another hypertensive urgency/emergency. PT otherwise feels well and has no other complaints or concerns.     MEDICATIONS  (STANDING):  aspirin 325 milliGRAM(s) Oral daily  atorvastatin 80 milliGRAM(s) Oral at bedtime  carvedilol 25 milliGRAM(s) Oral every 12 hours  enoxaparin Injectable 40 milliGRAM(s) SubCutaneous every 24 hours  lisinopril 40 milliGRAM(s) Oral daily    MEDICATIONS  (PRN):  acetaminophen     Tablet .. 650 milliGRAM(s) Oral every 6 hours PRN Temp greater or equal to 38C (100.4F), Mild Pain (1 - 3)  melatonin 3 milliGRAM(s) Oral at bedtime PRN Insomnia  ondansetron Injectable 4 milliGRAM(s) IV Push every 8 hours PRN Nausea and/or Vomiting    Allergies  hydrALAZINE (Rash)  penicillin (Urticaria; Rash)  pollen itchy eye (Eye Irritation)  sulfa drugs (Urticaria; Rash)    Intolerances    REVIEW OF SYSTEMS:  CONSTITUTIONAL: No fever or chills  HEENT:  No sore throat; + Low grade headache  RESPIRATORY: No cough, wheezing, or shortness of breath  CARDIOVASCULAR: No chest pain, palpitations  GASTROINTESTINAL: No abd pain, nausea, vomiting, or diarrhea  GENITOURINARY: No dysuria, frequency, or hematuria  NEUROLOGICAL: no focal weakness or dizziness  MUSCULOSKELETAL: no myalgias     Vital Signs Last 24 Hrs  T(C): 36.4 (2022 11:15), Max: 37 (2022 18:30)  T(F): 97.5 (2022 11:15), Max: 98.6 (2022 18:30)  HR: 68 (2022 11:15) (68 - 74)  BP: 170/82 (2022 11:15) (143/69 - 172/89)  RR: 19 (2022 11:15) (18 - 20)  SpO2: 95% (2022 11:15) (95% - 98%)    PHYSICAL EXAM:  GENERAL: NAD  HEENT:  anicteric, moist mucous membranes  CHEST/LUNG:  CTA b/l, no rales, wheezes, or rhonchi  HEART:  RRR, S1, S2  ABDOMEN:  BS+, soft, nontender, nondistended  EXTREMITIES: no edema, cyanosis, or calf tenderness  NERVOUS SYSTEM: answers questions and follows commands appropriately    LABS:                        12.8   9.13  )-----------( 195      ( 2022 07:12 )             39.8     CBC Full  -  ( 2022 07:12 )  WBC Count : 9.13 K/uL  Hemoglobin : 12.8 g/dL  Hematocrit : 39.8 %  Platelet Count - Automated : 195 K/uL  Mean Cell Volume : 84.0 fl  Mean Cell Hemoglobin : 27.0 pg  Mean Cell Hemoglobin Concentration : 32.2 gm/dL    2022 07:12    141    |  108    |  16     ----------------------------<  101    4.2     |  29     |  0.88     Ca    8.9        2022 07:12  Phos  3.6       2022 07:12  Mg     2.4       2022 07:12    PT/INR - ( 2022 21:23 )   PT: 11.8 sec;   INR: 1.01 ratio    PTT - ( 2022 21:23 )  PTT:29.5 sec    Urinalysis Basic - ( 2022 23:16 )  Color: Yellow / Appearance: Clear / S.010 / pH: x  Gluc: x / Ketone: Negative  / Bili: Negative / Urobili: Negative   Blood: x / Protein: Negative / Nitrite: Negative   Leuk Esterase: Negative / RBC: x / WBC x   Sq Epi: x / Non Sq Epi: x / Bacteria: x    RADIOLOGY & ADDITIONAL TESTS:  Personally reviewed.     Consultant(s) Notes Reviewed:  [x] YES  [ ] NO Patient is a 67y old  Male who presents with a chief complaint of Hypertensive urgency (2022 10:11)    INTERVAL HPI/OVERNIGHT EVENTS: No acute overnight events. Pt seen and examined at the bedside this AM. Pt reports improvement in his headache, it is now rated as a 2/10. AM BP was elevated to systolic 174. Pt is concerned about gettign discharged too early and having to come back due to another hypertensive urgency/emergency. PT otherwise feels well and has no other complaints or concerns.     MEDICATIONS  (STANDING):  aspirin 325 milliGRAM(s) Oral daily  atorvastatin 80 milliGRAM(s) Oral at bedtime  carvedilol 25 milliGRAM(s) Oral every 12 hours  enoxaparin Injectable 40 milliGRAM(s) SubCutaneous every 24 hours  lisinopril 40 milliGRAM(s) Oral daily    MEDICATIONS  (PRN):  acetaminophen     Tablet .. 650 milliGRAM(s) Oral every 6 hours PRN Temp greater or equal to 38C (100.4F), Mild Pain (1 - 3)  melatonin 3 milliGRAM(s) Oral at bedtime PRN Insomnia  ondansetron Injectable 4 milliGRAM(s) IV Push every 8 hours PRN Nausea and/or Vomiting    Allergies  hydrALAZINE (Rash)  penicillin (Urticaria; Rash)  pollen itchy eye (Eye Irritation)  sulfa drugs (Urticaria; Rash)    Intolerances    REVIEW OF SYSTEMS:  CONSTITUTIONAL: No fever or chills  HEENT:  No sore throat; + Low grade headache  RESPIRATORY: No cough, wheezing, or shortness of breath  CARDIOVASCULAR: No chest pain, palpitations  GASTROINTESTINAL: No abd pain, nausea, vomiting, or diarrhea  GENITOURINARY: No dysuria, frequency, or hematuria  NEUROLOGICAL: no focal weakness or dizziness  MUSCULOSKELETAL: no myalgias or arthralgias    Vital Signs Last 24 Hrs  T(C): 36.4 (2022 11:15), Max: 37 (2022 18:30)  T(F): 97.5 (2022 11:15), Max: 98.6 (2022 18:30)  HR: 68 (2022 11:15) (68 - 74)  BP: 170/82 (2022 11:15) (143/69 - 172/89)  RR: 19 (2022 11:15) (18 - 20)  SpO2: 95% (2022 11:15) (95% - 98%)    PHYSICAL EXAM:  GENERAL: NAD, nontoxic  HEENT:  anicteric, moist mucous membranes  CHEST/LUNG:  CTA b/l, no rales, wheezes, or rhonchi  HEART:  RRR, S1, S2  ABDOMEN:  BS+, soft, nontender, nondistended  EXTREMITIES: no edema, cyanosis, or calf tenderness  NERVOUS SYSTEM: answers questions and follows commands appropriately    LABS:                        12.8   9.13  )-----------( 195      ( 2022 07:12 )             39.8     CBC Full  -  ( 2022 07:12 )  WBC Count : 9.13 K/uL  Hemoglobin : 12.8 g/dL  Hematocrit : 39.8 %  Platelet Count - Automated : 195 K/uL  Mean Cell Volume : 84.0 fl  Mean Cell Hemoglobin : 27.0 pg  Mean Cell Hemoglobin Concentration : 32.2 gm/dL    2022 07:12    141    |  108    |  16     ----------------------------<  101    4.2     |  29     |  0.88     Ca    8.9        2022 07:12  Phos  3.6       2022 07:12  Mg     2.4       2022 07:12    PT/INR - ( 2022 21:23 )   PT: 11.8 sec;   INR: 1.01 ratio    PTT - ( 2022 21:23 )  PTT:29.5 sec    Urinalysis Basic - ( 2022 23:16 )  Color: Yellow / Appearance: Clear / S.010 / pH: x  Gluc: x / Ketone: Negative  / Bili: Negative / Urobili: Negative   Blood: x / Protein: Negative / Nitrite: Negative   Leuk Esterase: Negative / RBC: x / WBC x   Sq Epi: x / Non Sq Epi: x / Bacteria: x    RADIOLOGY & ADDITIONAL TESTS:  Personally reviewed.     Consultant(s) Notes Reviewed:  [x] YES  [ ] NO

## 2022-06-25 NOTE — PROGRESS NOTE ADULT - PROBLEM SELECTOR PLAN 3
- h/o severe aortic stenosis, s/p bio AVR in 5/4/22 with no complications and pt has been feeling good after surgery, able to walk with no symptoms   - No signs of volume overload on exam   - Echo after procedure with EF 50%, mild MR  - BMP slighly elevated 542 and downtrend from previous result before procedure.   - Monitor volume status, I&Os.  - Continue ASA. - h/o severe aortic stenosis, s/p bio AVR in 5/4/22 with no complications and pt has been feeling good after surgery, able to walk with no symptoms   - No signs of volume overload on exam   - Echo after procedure with EF 50%, mild MR  - BMP slighly elevated 542 and downtrend from previous result before procedure  - Monitor volume status, I&O's.  - Continue ASA.

## 2022-06-25 NOTE — PROGRESS NOTE ADULT - ASSESSMENT
67 year old male with PMH of HTN, HLD, nonobstructive CAD on cardiac cath (EF 51% intraoperatively), severe AS s/p bio AVR, prediabetes, obesity, WAQAS, presented to the ED with elevated blood pressure and severe headache, admitted for hypertensive urgency, cardiology consulted.     - p/w HA, elev -190's   - Hypertensive episodes likely attributed to increased salt intake and poor diet, patient educated on importance of home cooked meals and low sodium intake  - s/p Cardene gtt, labetalol 10mg IVP x1 with improvement in BP in ED   - BP remains uncontrolled, 150-170's systolics  - Telemetry: SR 70's no events overnight   - continue lisinopril, coreg  - continue amlodipine, increase to 5mg daily   - EKG NSR rate 68, LAD, no significant change from June 21 2022, no signs of active ischemia  - Trop neg x1, no anginal complaints   - no signs of volume overload  - Monitor routine hemodynamics  - Monitor and replete lytes, keep K>4, Mg>2.    - Other cardiovascular workup will depend on clinical course.  - All other workup per primary team.  - Will continue to follow.    Adilia Whitman [facility]  Nurse Practitioner - Cardiology   Spectra #[phone]
Pt is a 67 year old male with PMH of HTN, HLD, nonobstructive CAD on cardiac cath (EF 51% intraoperatively), severe AS s/p bio AVR, prediabetes, obesity, WAQAS, presented to the ED with his wife complaining of elevated blood pressure and severe headache. Admitted for hypertensive urgency and severe headache.
Pt is a 67 year old male with PMH of HTN, HLD, nonobstructive CAD on cardiac cath (EF 51% intraoperatively), severe AS s/p bio AVR, prediabetes, obesity, WAQAS, presented to the ED with his wife complaining of elevated blood pressure and severe headache. Admitted for hypertensive urgency and severe headache.

## 2022-06-25 NOTE — DISCHARGE NOTE NURSING/CASE MANAGEMENT/SOCIAL WORK - NSDCPEFALRISK_GEN_ALL_CORE
For information on Fall & Injury Prevention, visit: [email or web address] OR  [email or web address] OR  https://www.cdc.gov/steadi/patient.html

## 2022-06-25 NOTE — PROGRESS NOTE ADULT - PROBLEM SELECTOR PLAN 2
RESOLVED  - Found to have WBC 14.51 today and 13.27 two days ago, with normal differential   - Normal ESR, INR. Normal UA  - No fevers in the ED or reported at home   - No symptoms or signs of infection  - Likely reactive and appears chronic  - Procalcitonin negative  - Monitor and trend WBC, monitor temp, off of Antibiotics for now. resolved. no concerns for infection.

## 2022-06-25 NOTE — DISCHARGE NOTE NURSING/CASE MANAGEMENT/SOCIAL WORK - PATIENT PORTAL LINK FT
You can access the FollowMyHealth Patient Portal offered by [facility] by registering at the following website: [email or web address]. By joining [facility]’s FollowMyHealth portal, you will also be able to view your health information using other applications (apps) compatible with our system.

## 2022-06-25 NOTE — PROGRESS NOTE ADULT - SUBJECTIVE AND OBJECTIVE BOX
[facility] -- Na Amin, Olga Lidia Mcclendon, Edy Gill Savella, Goodger: Office # [phone]    Follow Up:  hypertensive urgency, HLD, severe AS    Subjective/Observations: Patient seen and examined, awake, alert,  denies chest pain, dyspnea, palpitations or dizziness, c/o headache. Tolerating room air.     REVIEW OF SYSTEMS: All review of systems is negative for eye, ENT, GI, , allergic, dermatologic, musculoskeletal and neurologic except as described above    PAST MEDICAL & SURGICAL HISTORY:  Hypertension      Testosterone deficiency      H/O hyperlipidemia      Renal colic      Unilateral inguinal hernia with obstruction and without gangrene, recurrence not specified      Obstructive sleep apnea      Status post medial meniscus repair  2015      Collapsed lung      H/O lithotripsy      H/O inguinal hernia repair      H/O aortic valve replacement      S/P aortic valve replacement  05/2022          MEDICATIONS  (STANDING):  aspirin 325 milliGRAM(s) Oral daily  atorvastatin 80 milliGRAM(s) Oral at bedtime  carvedilol 12.5 milliGRAM(s) Oral every 12 hours  enoxaparin Injectable 40 milliGRAM(s) SubCutaneous every 24 hours  lisinopril 40 milliGRAM(s) Oral daily    MEDICATIONS  (PRN):  acetaminophen     Tablet .. 650 milliGRAM(s) Oral every 6 hours PRN Temp greater or equal to 38C (100.4F), Mild Pain (1 - 3)  melatonin 3 milliGRAM(s) Oral at bedtime PRN Insomnia  ondansetron Injectable 4 milliGRAM(s) IV Push every 8 hours PRN Nausea and/or Vomiting    Allergies    hydrALAZINE (Rash)  penicillin (Urticaria; Rash)  pollen itchy eye (Eye Irritation)  sulfa drugs (Urticaria; Rash)    Intolerances      Vital Signs Last 24 Hrs  T(C): 36.4 (25 Jun 2022 04:37), Max: 37 (24 Jun 2022 18:30)  T(F): 97.5 (25 Jun 2022 04:37), Max: 98.6 (24 Jun 2022 18:30)  HR: 70 (25 Jun 2022 04:37) (70 - 74)  BP: 172/89 (25 Jun 2022 04:37) (143/69 - 172/89)  BP(mean): --  RR: 18 (25 Jun 2022 04:37) (18 - 20)  SpO2: 95% (25 Jun 2022 04:37) (95% - 98%)  I&O's Summary        TELE: SR 74  PHYSICAL EXAM:  Constitutional: NAD, awake and alert, well-developed  HEENT: Moist Mucous Membranes, Anicteric  Pulmonary: Non-labored, breath sounds are clear bilaterally, No wheezing, rales or rhonchi  Cardiovascular: Regular, S1 and S2, No murmurs, rubs, gallops or clicks  Gastrointestinal: Bowel Sounds present, soft, nontender.   Lymph: No peripheral edema. No lymphadenopathy.  Skin: No visible rashes or ulcers.  Psych:  Mood & affect appropriate for situation    LABS: All Labs Reviewed:                        12.8   9.13  )-----------( 195      ( 25 Jun 2022 07:12 )             39.8                         12.1   10.92 )-----------( 191      ( 24 Jun 2022 04:26 )             37.8                         12.4   14.51 )-----------( 202      ( 23 Jun 2022 21:23 )             39.6     25 Jun 2022 07:12    141    |  108    |  16     ----------------------------<  101    4.2     |  29     |  0.88   24 Jun 2022 04:26    139    |  108    |  21     ----------------------------<  102    4.9     |  26     |  1.00   23 Jun 2022 21:23    141    |  106    |  20     ----------------------------<  110    5.0     |  31     |  1.00     Ca    8.9        25 Jun 2022 07:12  Ca    8.4        24 Jun 2022 04:26  Ca    9.0        23 Jun 2022 21:23  Phos  3.6       25 Jun 2022 07:12  Phos  4.3       24 Jun 2022 04:26  Mg     2.4       25 Jun 2022 07:12  Mg     2.3       24 Jun 2022 04:26  Mg     2.3       23 Jun 2022 21:23    TPro  7.2    /  Alb  3.1    /  TBili  0.4    /  DBili  x      /  AST  37     /  ALT  25     /  AlkPhos  59     24 Jun 2022 04:26  TPro  7.7    /  Alb  3.4    /  TBili  0.4    /  DBili  x      /  AST  28     /  ALT  28     /  AlkPhos  61     23 Jun 2022 21:23    PT/INR - ( 23 Jun 2022 21:23 )   PT: 11.8 sec;   INR: 1.01 ratio         PTT - ( 23 Jun 2022 21:23 )  PTT:29.5 sec  Troponin I, High Sensitivity Result: 8.1 ng/L (06-23-22 @ 21:23)  Troponin I, High Sensitivity Result: 6.6 ng/L (06-21-22 @ 16:22)            Cholesterol, Serum: 154 mg/dL (05-03-22 @ 08:48)  HDL Cholesterol, Serum: 37 mg/dL (05-03-22 @ 08:48)  Triglycerides, Serum: 90 mg/dL (05-03-22 @ 08:48)      12 Lead ECG:   Ventricular Rate 68 BPM    Atrial Rate 68 BPM    P-R Interval 208 ms    QRS Duration 88 ms    Q-T Interval 390 ms    QTC Calculation(Bazett) 414 ms    P Axis 39 degrees    R Axis -31 degrees    T Axis 122 degrees    Diagnosis Line Normal sinus rhythm  Left axis deviation  Minimal voltage criteria for LVH, may be normal variant ( R in aVL )  Nonspecific T wave abnormality  Abnormal ECG  When compared with ECG of 21-JUN-2022 16:40,  No significant change was found  Confirmed by alta Garcia ([id number]) on 6/24/2022 3:49:35 PM (06-23-22 @ 20:53)    ra< from: Xray Chest 2 Views PA/Lat (06.21.22 @ 17:26) >    ACC: [id number] EXAM:  XR CHEST PA LAT 2V                          PROCEDURE DATE:  06/21/2022          INTERPRETATION:  INDICATION: Chest pain    COMPARISON: 5/7/2022    FINDINGS:  Frontal and lateral views of the chest show clear lungs bilaterally.No   infiltrates are seen. There is no pneumothorax. There is no pleural   effusion. There is no hilar or mediastinal widening. The heart is   enlarged with apparent aortic valve replacement and midline sternal wires   but with no CHF.    IMPRESSION:  1. Clear lungs with no acute cardiopulmonary abnormalities.  2. Mild cardiomegaly with prosthetic heart valve and midline sternal   wires but no CHF.    --- End of Report ---            ALAN READ MD; Attending Radiologist  This document has been electronically signed. Jun 21 2022  5:28PM    < end of copied text >  < from: Intra-Operative Transesophageal Echo (05.04.22 @ 11:22) >    Patient name: HUGH SCOTT  YOB: 1955   Age: 66 (M)   MR#: [id number]  Study Date: 5/4/2022  Location: O/PSonographer: Herman Casey M.D.  Study quality: Technically good  Referring Physician: [employer],  M.D  Blood Pressure: 140/60 mmHg  Height: 167 cm  Weight: 95 kg  BSA: 2 m2  Heart Rate: 70 mmHg  ------------------------------------------------------------------------  PROCEDURE: Intra operative transeophageal echocardiogram  with 2D, M mode and complete  Doppler examination.  The  transesophageal probe was placed in the esophagus after  induction of anesthesia.  INDICATION: Nonrheumatic aortic (valve) stenosis (I35.0)  ------------------------------------------------------------------------  Dimensions:    Normal Values:  LA:            2.0 - 4.0 cm  Ao:            2.0 - 3.8 cm  SEPTUM: 1.0    0.6 - 1.2 cm  PWT:    1.3    0.6 - 1.1 cm  LVIDd:  5.3    3.0 - 5.6 cm  LVIDs:  3.9    1.8 - 4.0 cm  Derived variables:  LVMI: 119 g/m2  RWT: 0.49  Fractional short: 26 %  EF (Artemiotz): 51 %  ------------------------------------------------------------------------  Pre-Bypass Observations:  Mitral Valve: Normal mitral valve. Minimal mitral  regurgitation.  Aortic Valve/Aorta: Calcified trileaflet aortic valve with  decreased opening. Peak transaortic valve gradient equals  101 mm Hg, mean transaortic valve gradient equals 64 mm Hg,  estimated aortic valve area equals 0.4 sqcm (by continuity  equation), aortic valve velocity time integral equals 117  cm, consistent with severe aortic stenosis. Moderate-severe  aortic regurgitation. LVOT velocity time integral equals 16  cm.  Ascending Aorta: 2.9 cm. LVOT diameter: 1.9 cm. Normal  ascending aorta.  Left Atrium: Normal left atrium.  Left Ventricle: Mild global left ventricular systolic  dysfunction. Moderate concentric left ventricular  hypertrophy. Moderate diastolic dysfunction (Stage II).  Right Heart: Normal right atrium. Normal right ventricular  size and function. Normal tricuspid valve. Minimal  tricuspid regurgitation. Normal pulmonic valve.  Pericardium/Pleura: Normal pericardium with trace  pericardial effusion.  Hemodynamic: Color Doppler demonstrates no evidence of a  patent foramen ovale.  ------------------------------------------------------------------------  Post-Bypass Observations:    S/p bioprosthetic AVR, all leaflets open and coapt  freely. There is no aortic insufficiency, paravalvular  leaks, or LVOT obstruction. The biventricular function is  unchangedfrom pre-bypass. There are no new regional wall  motion abnormalities. There is now mild central mitral  regurgitation. There are no changes to the tricuspid or  pulmonic valves. The aorta is intact. There are no other  changes from the pre-bypass exam.  ------------------------------------------------------------------------  Conclusions:  1. Normal mitral valve. Minimal mitral regurgitation.  2. Calcified trileaflet aortic valve with decreased  opening. Peak transaortic valve gradient equals 101 mm Hg,  mean transaortic valve gradient equals 64 mm Hg, estimated  aortic valve area equals 0.4 sqcm (by continuity equation),  aortic valve velocity time integral equals 117 cm,  consistent with severe aortic stenosis. Moderate-severe  aortic regurgitation.  3. Ascending Aorta: 2.9 cm. LVOT diameter: 1.9 cm. Normal  ascending aorta.  4. Normal left atrium.  5. Moderate concentric left ventricular hypertrophy.  6. Mild global left ventricular systolic dysfunction.  7. Moderate diastolic dysfunction (Stage II).  8. Normal right atrium.  9. Normal right ventricular size and function.  10. Normal tricuspid valve. Minimal tricuspid  regurgitation.  11. Color Doppler demonstrates no evidence of a patent  foramen ovale.  12. Normal pericardium with trace pericardial effusion.  All findings were discussed with Dr. Cameron.  Confirmed on  5/4/2022 - 12:07:49 by Herman Casey M.D.  ------------------------------------------------------------------------    < end of copied text >

## 2022-06-25 NOTE — PROGRESS NOTE ADULT - ATTENDING COMMENTS
The patient was seen and evaluated independently by the attending physician.  - I have personally reviewed the pt's labs, imaging, micro data and consultant recommendations.    #hypertensive urgency  - stil symptomatic. added additional norvasc to regimen and tolerating well thus far. will continue to observe for now. once asymptomatic will dc.
The patient was seen and evaluated independently by the attending physician.  - I have personally reviewed the pt's labs, imaging, micro data and consultant recommendations.    #hypertensive urgency  - adjusted BP meds  - now without symptoms  - dc planning if remains asymptomatic  - prelim tte reviewed without acute findings

## 2022-06-27 ENCOUNTER — RX RENEWAL (OUTPATIENT)
Age: 67
End: 2022-06-27

## 2022-06-28 ENCOUNTER — NON-APPOINTMENT (OUTPATIENT)
Age: 67
End: 2022-06-28

## 2022-06-28 ENCOUNTER — APPOINTMENT (OUTPATIENT)
Dept: INTERNAL MEDICINE | Facility: CLINIC | Age: 67
End: 2022-06-28

## 2022-06-28 VITALS
HEART RATE: 78 BPM | TEMPERATURE: 98.8 F | RESPIRATION RATE: 12 BRPM | DIASTOLIC BLOOD PRESSURE: 82 MMHG | BODY MASS INDEX: 32.14 KG/M2 | SYSTOLIC BLOOD PRESSURE: 129 MMHG | HEIGHT: 66 IN | WEIGHT: 200 LBS | OXYGEN SATURATION: 96 %

## 2022-06-28 VITALS — DIASTOLIC BLOOD PRESSURE: 80 MMHG | SYSTOLIC BLOOD PRESSURE: 128 MMHG

## 2022-06-28 PROCEDURE — 99496 TRANSJ CARE MGMT HIGH F2F 7D: CPT

## 2022-06-28 RX ORDER — CARVEDILOL 12.5 MG/1
12.5 TABLET, FILM COATED ORAL TWICE DAILY
Qty: 180 | Refills: 1 | Status: DISCONTINUED | COMMUNITY
Start: 2022-06-23 | End: 2022-06-28

## 2022-07-12 ENCOUNTER — NON-APPOINTMENT (OUTPATIENT)
Age: 67
End: 2022-07-12

## 2022-07-12 ENCOUNTER — APPOINTMENT (OUTPATIENT)
Dept: CARDIOLOGY | Facility: CLINIC | Age: 67
End: 2022-07-12

## 2022-07-12 VITALS
WEIGHT: 202 LBS | SYSTOLIC BLOOD PRESSURE: 123 MMHG | OXYGEN SATURATION: 97 % | DIASTOLIC BLOOD PRESSURE: 75 MMHG | HEIGHT: 66 IN | BODY MASS INDEX: 32.47 KG/M2 | HEART RATE: 76 BPM

## 2022-07-12 PROCEDURE — 93000 ELECTROCARDIOGRAM COMPLETE: CPT

## 2022-07-12 PROCEDURE — 99215 OFFICE O/P EST HI 40 MIN: CPT

## 2022-07-12 RX ORDER — AMLODIPINE BESYLATE 2.5 MG/1
2.5 TABLET ORAL
Qty: 15 | Refills: 0 | Status: DISCONTINUED | COMMUNITY
Start: 2022-06-24

## 2022-07-19 ENCOUNTER — APPOINTMENT (OUTPATIENT)
Dept: INTERNAL MEDICINE | Facility: CLINIC | Age: 67
End: 2022-07-19

## 2022-07-19 VITALS
HEART RATE: 72 BPM | TEMPERATURE: 98.6 F | SYSTOLIC BLOOD PRESSURE: 107 MMHG | OXYGEN SATURATION: 94 % | DIASTOLIC BLOOD PRESSURE: 73 MMHG

## 2022-07-19 DIAGNOSIS — T63.441A TOXIC EFFECT OF VENOM OF BEES, ACCIDENTAL (UNINTENTIONAL), INITIAL ENCOUNTER: ICD-10-CM

## 2022-07-19 PROCEDURE — 99213 OFFICE O/P EST LOW 20 MIN: CPT

## 2022-07-19 NOTE — ASSESSMENT
[FreeTextEntry1] : 1. Insect bite with mild superficial cellulitis and localized reaction\par Start doxycycline 100mg BID x 7 days\par Cool soaks, benadryl as need for itching\par to call if not improving or if worsening

## 2022-07-19 NOTE — PHYSICAL EXAM
[No Acute Distress] : no acute distress [Well Nourished] : well nourished [Well Developed] : well developed [Well-Appearing] : well-appearing [No Respiratory Distress] : no respiratory distress  [No Accessory Muscle Use] : no accessory muscle use [Clear to Auscultation] : lungs were clear to auscultation bilaterally [Normal Rate] : normal rate  [Regular Rhythm] : with a regular rhythm [Normal S1, S2] : normal S1 and S2 [de-identified] : There is some minimal redness, wamrth, and swelling under chin at location of bee sting. no LAD. no fluctuance. no other rash.

## 2022-07-19 NOTE — REVIEW OF SYSTEMS
[Fever] : no fever [Chills] : no chills [Chest Pain] : no chest pain [Shortness Of Breath] : no shortness of breath [Skin Rash] : no skin rash

## 2022-08-02 ENCOUNTER — APPOINTMENT (OUTPATIENT)
Dept: PHARMACY | Facility: CLINIC | Age: 67
End: 2022-08-02

## 2022-08-02 ENCOUNTER — APPOINTMENT (OUTPATIENT)
Dept: OTOLARYNGOLOGY | Facility: CLINIC | Age: 67
End: 2022-08-02

## 2022-08-02 VITALS
HEIGHT: 66 IN | HEART RATE: 66 BPM | WEIGHT: 201 LBS | BODY MASS INDEX: 32.3 KG/M2 | DIASTOLIC BLOOD PRESSURE: 63 MMHG | SYSTOLIC BLOOD PRESSURE: 100 MMHG

## 2022-08-02 PROCEDURE — 92557 COMPREHENSIVE HEARING TEST: CPT

## 2022-08-02 PROCEDURE — 92567 TYMPANOMETRY: CPT

## 2022-08-02 PROCEDURE — V5010 ASSESSMENT FOR HEARING AID: CPT

## 2022-08-02 PROCEDURE — 99203 OFFICE O/P NEW LOW 30 MIN: CPT

## 2022-08-02 NOTE — ASSESSMENT
[FreeTextEntry1] : LEFT ASYMETRY UNCHANGED FROM PREVIOUS  MORE THAN 3 YEARS AGO\par HEARING AIDS EVALUATION\par F/U AFTER ABOVE

## 2022-08-02 NOTE — REVIEW OF SYSTEMS
[Seasonal Allergies] : seasonal allergies [Hearing Loss] : hearing loss [Problem Snoring] : problem snoring [Snoring With Pauses] : snoring with pauses [Joint Pain] : joint pain [Negative] : Heme/Lymph [Patient Intake Form Reviewed] : Patient intake form was reviewed [FreeTextEntry9] : Muscle aches [FreeTextEntry1] : Daytime sleepiness

## 2022-08-02 NOTE — DATA REVIEWED
[de-identified] : type A tymps AU\par AD: hearing -8000 Hz\par AS: mild to WNL sloping to severe SNHL 250-8000 Hz\par 1) ent f/u 2) re-eval as per MD 3) HAE

## 2022-08-16 DIAGNOSIS — M79.89 OTHER SPECIFIED SOFT TISSUE DISORDERS: ICD-10-CM

## 2022-08-23 ENCOUNTER — APPOINTMENT (OUTPATIENT)
Dept: CARDIOLOGY | Facility: CLINIC | Age: 67
End: 2022-08-23

## 2022-08-23 ENCOUNTER — NON-APPOINTMENT (OUTPATIENT)
Age: 67
End: 2022-08-23

## 2022-08-23 VITALS
HEART RATE: 73 BPM | HEIGHT: 66 IN | DIASTOLIC BLOOD PRESSURE: 70 MMHG | OXYGEN SATURATION: 95 % | BODY MASS INDEX: 32.14 KG/M2 | WEIGHT: 200 LBS | SYSTOLIC BLOOD PRESSURE: 113 MMHG

## 2022-08-23 PROCEDURE — 93000 ELECTROCARDIOGRAM COMPLETE: CPT

## 2022-08-23 PROCEDURE — 93306 TTE W/DOPPLER COMPLETE: CPT

## 2022-08-23 PROCEDURE — 99214 OFFICE O/P EST MOD 30 MIN: CPT

## 2022-08-23 RX ORDER — AMLODIPINE BESYLATE 5 MG/1
5 TABLET ORAL
Qty: 90 | Refills: 3 | Status: DISCONTINUED | COMMUNITY
Start: 2022-06-28 | End: 2022-08-23

## 2022-08-23 RX ORDER — DOXYCYCLINE HYCLATE 100 MG/1
100 CAPSULE ORAL
Qty: 14 | Refills: 0 | Status: DISCONTINUED | COMMUNITY
Start: 2022-07-19 | End: 2022-08-23

## 2022-08-23 RX ORDER — SENNA 8.6 MG/1
8.6 TABLET, FILM COATED ORAL
Refills: 0 | Status: DISCONTINUED | COMMUNITY
Start: 2022-05-10 | End: 2022-08-23

## 2022-08-29 ENCOUNTER — RX RENEWAL (OUTPATIENT)
Age: 67
End: 2022-08-29

## 2022-08-30 ENCOUNTER — APPOINTMENT (OUTPATIENT)
Dept: PHARMACY | Facility: CLINIC | Age: 67
End: 2022-08-30

## 2022-08-30 LAB
ALBUMIN SERPL ELPH-MCNC: 4.5 G/DL
ALP BLD-CCNC: 72 U/L
ALT SERPL-CCNC: 12 U/L
ANION GAP SERPL CALC-SCNC: 11 MMOL/L
AST SERPL-CCNC: 16 U/L
BASOPHILS # BLD AUTO: 0.07 K/UL
BASOPHILS NFR BLD AUTO: 0.6 %
BILIRUB SERPL-MCNC: 0.2 MG/DL
BUN SERPL-MCNC: 45 MG/DL
CALCIUM SERPL-MCNC: 9.6 MG/DL
CHLORIDE SERPL-SCNC: 105 MMOL/L
CHOLEST SERPL-MCNC: 126 MG/DL
CO2 SERPL-SCNC: 27 MMOL/L
CREAT SERPL-MCNC: 1.58 MG/DL
EGFR: 47 ML/MIN/1.73M2
EOSINOPHIL # BLD AUTO: 1.51 K/UL
EOSINOPHIL NFR BLD AUTO: 13.2 %
ESTIMATED AVERAGE GLUCOSE: 126 MG/DL
GLUCOSE SERPL-MCNC: 105 MG/DL
HBA1C MFR BLD HPLC: 6 %
HCT VFR BLD CALC: 42.1 %
HDLC SERPL-MCNC: 35 MG/DL
HGB BLD-MCNC: 13.4 G/DL
IMM GRANULOCYTES NFR BLD AUTO: 0.4 %
LDLC SERPL CALC-MCNC: 63 MG/DL
LYMPHOCYTES # BLD AUTO: 2.51 K/UL
LYMPHOCYTES NFR BLD AUTO: 22 %
MAN DIFF?: NORMAL
MCHC RBC-ENTMCNC: 26.3 PG
MCHC RBC-ENTMCNC: 31.8 GM/DL
MCV RBC AUTO: 82.5 FL
MONOCYTES # BLD AUTO: 0.63 K/UL
MONOCYTES NFR BLD AUTO: 5.5 %
NEUTROPHILS # BLD AUTO: 6.66 K/UL
NEUTROPHILS NFR BLD AUTO: 58.3 %
NONHDLC SERPL-MCNC: 91 MG/DL
PLATELET # BLD AUTO: 170 K/UL
POTASSIUM SERPL-SCNC: 4.4 MMOL/L
PROT SERPL-MCNC: 7.3 G/DL
RBC # BLD: 5.1 M/UL
RBC # FLD: 14.7 %
SODIUM SERPL-SCNC: 142 MMOL/L
TRIGL SERPL-MCNC: 139 MG/DL
WBC # FLD AUTO: 11.43 K/UL

## 2022-08-30 PROCEDURE — V5257B: CUSTOM | Mod: LT

## 2022-09-08 PROBLEM — Z09 SURGICAL FOLLOWUP: Status: ACTIVE | Noted: 2022-09-08

## 2022-09-09 ENCOUNTER — RX RENEWAL (OUTPATIENT)
Age: 67
End: 2022-09-09

## 2022-09-12 ENCOUNTER — APPOINTMENT (OUTPATIENT)
Dept: CARDIOTHORACIC SURGERY | Facility: CLINIC | Age: 67
End: 2022-09-12

## 2022-09-12 VITALS
OXYGEN SATURATION: 97 % | BODY MASS INDEX: 31.34 KG/M2 | DIASTOLIC BLOOD PRESSURE: 90 MMHG | WEIGHT: 195 LBS | TEMPERATURE: 98 F | SYSTOLIC BLOOD PRESSURE: 113 MMHG | HEART RATE: 67 BPM | RESPIRATION RATE: 14 BRPM | HEIGHT: 66 IN

## 2022-09-12 DIAGNOSIS — R01.1 CARDIAC MURMUR, UNSPECIFIED: ICD-10-CM

## 2022-09-12 PROCEDURE — 99214 OFFICE O/P EST MOD 30 MIN: CPT

## 2022-09-15 ENCOUNTER — RX RENEWAL (OUTPATIENT)
Age: 67
End: 2022-09-15

## 2022-09-15 ENCOUNTER — APPOINTMENT (OUTPATIENT)
Dept: CARDIOTHORACIC SURGERY | Facility: CLINIC | Age: 67
End: 2022-09-15

## 2022-09-15 DIAGNOSIS — Z09 ENCOUNTER FOR FOLLOW-UP EXAMINATION AFTER COMPLETED TREATMENT FOR CONDITIONS OTHER THAN MALIGNANT NEOPLASM: ICD-10-CM

## 2022-09-16 NOTE — PHYSICAL EXAM
[Sclera] : the sclera and conjunctiva were normal [PERRL With Normal Accommodation] : pupils were equal in size, round, and reactive to light [Neck Appearance] : the appearance of the neck was normal [] : no respiratory distress [Respiration, Rhythm And Depth] : normal respiratory rhythm and effort [Apical Impulse] : the apical impulse was normal [Heart Rate And Rhythm] : heart rate was normal and rhythm regular [Examination Of The Chest] : the chest was normal in appearance [2+] : left 2+ [Breast Appearance] : normal in appearance [Bowel Sounds] : normal bowel sounds [Abdomen Soft] : soft [No CVA Tenderness] : no ~M costovertebral angle tenderness [Abnormal Walk] : normal gait [Involuntary Movements] : no involuntary movements were seen [Skin Color & Pigmentation] : normal skin color and pigmentation [Skin Turgor] : normal skin turgor [No Focal Deficits] : no focal deficits [Oriented To Time, Place, And Person] : oriented to person, place, and time [Impaired Insight] : insight and judgment were intact [Memory Recent] : recent memory was not impaired [Memory Remote] : remote memory was not impaired [FreeTextEntry1] : Deferred

## 2022-09-16 NOTE — HISTORY OF PRESENT ILLNESS
[FreeTextEntry1] : Mr. SCOTT is a 66 year old male with past medical history of Hypertension, Hyperlipidemia, WAQAS (uses CPAP) ,BPH, aortic stenosis with complaints of dyspnea on exertion. \par \par He is S/P Aortic valve replacement using 23 mm Leon Inspiris Resilia bio prosthetic valve on 5/4/22. Post op course significant with erythema noted on chest.no further hydralazine secondary to allergy- medication added to EMR. 5/8 Remains 12kg above preop weight. Aldactone initiated. Discharged to Home. He is here for 3 months follow up visit with TTE. \par \par This visit, he stated that he is doing well .  He reports that he had dizziness/lightheadedness and his chlorthalidone was decreased to 12.5 mg daily and since then his got better. Denies any chest pain, shortness of breath, palpitations, dizziness or pedal edema. \par

## 2022-09-16 NOTE — ASSESSMENT
[FreeTextEntry1] : Mr. SCOTT is a 66 year old male with past medical history of Hypertension, Hyperlipidemia, WAQAS (uses CPAP) ,BPH, aortic stenosis with complaints of dyspnea on exertion. \par \par He is S/P Aortic valve replacement using 23 mm Leon Inspiris Resilia bio prosthetic valve on 5/4/22. Post op course significant with erythema noted on chest.no further hydralazine secondary to allergy- medication added to EMR. 5/8 Remains 12kg above preop weight. Aldactone initiated. Discharged to Home. He is here for 3 months follow up visit with TTE. \par \par This visit, he is doing well .  He reports that he had dizziness/lightheadedness and his chlorthalidone was decreased to 12.5 mg daily and since then his got better. Denies any chest pain, shortness of breath, palpitations, dizziness or pedal edema. \par \par  reviewed the TTE and explained to patient. 8/23/22 TTE revealed EF 60 to 65%, Minimal MR. Bio prosthetic aortic valve . PGR 13 mm hg, MGR 6 mm hg which is probably normal in the presence of a bio prosthetic aortic valve. No AR.  \par \par Today on exam patient's lungs clear bilaterally, normal sinus rhythm, sternum stable, incision clean, dry and intact.   No peripheral edema noted.\par \par Plan:\par 1) Continue current medication regimen\par 2) Follow up with cardiologist ( ) and PCP \par 3) May return on as needed basis \par 4) Ambulate as tolerated \par 5) SBE antibiotic prophylaxis discussed at length \par 6) Continue to increase activity and walk daily as tolerated. Continue to use incentive spirometer. \par 7) Keep legs elevated above heart when resting/sitting/sleeping. \par 8) Notify the surgeon with any fever, chills , temperature > 101 F or redness to surgical incision site\par

## 2022-09-16 NOTE — CONSULT LETTER
[FreeTextEntry2] : Dr.Gaetano Gill [FreeTextEntry1] : I had the pleasure of seeing your patient, HUGH SCOTT, in my office today. \par \par We take a multidisciplinary team approach to patient care and consider you, the referring physician, an extension of our team. We will maintain an open line of communication with you throughout your patient's treatment course.  \par \par As you recall, he is a 67 year old male status post Aortic valve replacement using 23 mm Leon Inspiris Resilia bio prosthetic valve on 5/4/22 .The patient presents to the office today for a routine follow up visit with repeat diagnostic imaging . I have enclosed a copy for your records.\par \par 8/23/22 TTE revealed EF 60 to 65%, Minimal MR. Bio prosthetic aortic valve . PGR 13 mm hg, MGR 6 mm hg which is probably normal in the presence of a bio prosthetic aortic valve. No AR.  \par \par Today on exam patient's lungs clear bilaterally, normal sinus rhythm, sternum stable, incision clean, dry and intact.   No peripheral edema noted.  Therefore, I have recommended that the patient to follow up with Cardiologist and PCP. \par \par I appreciate the opportunity to care for your patient at [facility] . If there are any questions or concerns, please call me at [phone]. \par \par Please see my note below. \par \par Sincerely, \par \par \par \par \par Micah Cameron MD\par Director\par [facility]\par Professor \par Cardiovascular & Thoracic Surgery\par [facility] [employer].\par \par \par [facility]:\par Department of Cardiovascular and Thoracic Surgery\[address], [address]\par Office: [phone]\par Fax: [phone]\par \par Roxann Pace\par  \[facility] Department of Cardiovascular and Thoracic Surgery\[address], [address]\par Phone: [phone]\par Office: [phone]\par \par

## 2022-09-16 NOTE — END OF VISIT
[FreeTextEntry3] : \par I personally scribed for ROYAL MARIE on Sep 12 2022 10:00AM . \par \par \par \par \par Physician Attestation:\par Documented by KAZ MASCORRO acting as a scribe for ROYAL MARIE 09/12/2022 . \par                 All medical record entries made by the Scribe were at my, ROYAL MARIE , direction and personally dictated by me on 09/12/2022 . I have reviewed the chart and agree that the record accurately reflects my personal performance of the history, physical exam, assessment and plan\par \par

## 2022-09-19 LAB
ALBUMIN SERPL ELPH-MCNC: 4.2 G/DL
ALP BLD-CCNC: 59 U/L
ALT SERPL-CCNC: 21 U/L
ANION GAP SERPL CALC-SCNC: 15 MMOL/L
AST SERPL-CCNC: 20 U/L
BILIRUB SERPL-MCNC: 0.4 MG/DL
BUN SERPL-MCNC: 22 MG/DL
CALCIUM SERPL-MCNC: 9.6 MG/DL
CHLORIDE SERPL-SCNC: 103 MMOL/L
CO2 SERPL-SCNC: 24 MMOL/L
CREAT SERPL-MCNC: 1.2 MG/DL
EGFR: 66 ML/MIN/1.73M2
GLUCOSE SERPL-MCNC: 109 MG/DL
POTASSIUM SERPL-SCNC: 4.1 MMOL/L
PROT SERPL-MCNC: 7.6 G/DL
SODIUM SERPL-SCNC: 142 MMOL/L

## 2022-09-27 ENCOUNTER — APPOINTMENT (OUTPATIENT)
Dept: PHARMACY | Facility: CLINIC | Age: 67
End: 2022-09-27

## 2022-10-06 ENCOUNTER — APPOINTMENT (OUTPATIENT)
Dept: PHARMACY | Facility: CLINIC | Age: 67
End: 2022-10-06

## 2022-10-06 NOTE — PATIENT PROFILE ADULT - NSTRANSFERBELONGINGSRESP_GEN_A_NUR
yes Eucrisa Counseling: Patient may experience a mild burning sensation during topical application. Eucrisa is not approved in children less than 3 months of age.

## 2022-10-07 ENCOUNTER — APPOINTMENT (OUTPATIENT)
Dept: INTERNAL MEDICINE | Facility: CLINIC | Age: 67
End: 2022-10-07

## 2022-10-11 ENCOUNTER — APPOINTMENT (OUTPATIENT)
Dept: PHARMACY | Facility: CLINIC | Age: 67
End: 2022-10-11

## 2022-10-29 ENCOUNTER — NON-APPOINTMENT (OUTPATIENT)
Age: 67
End: 2022-10-29

## 2022-11-01 ENCOUNTER — APPOINTMENT (OUTPATIENT)
Dept: PHARMACY | Facility: CLINIC | Age: 67
End: 2022-11-01

## 2022-11-01 PROCEDURE — V5299A: CUSTOM | Mod: NC

## 2022-11-02 ENCOUNTER — APPOINTMENT (OUTPATIENT)
Dept: CARDIOLOGY | Facility: CLINIC | Age: 67
End: 2022-11-02

## 2022-11-02 ENCOUNTER — NON-APPOINTMENT (OUTPATIENT)
Age: 67
End: 2022-11-02

## 2022-11-02 VITALS
BODY MASS INDEX: 31.66 KG/M2 | SYSTOLIC BLOOD PRESSURE: 110 MMHG | DIASTOLIC BLOOD PRESSURE: 75 MMHG | WEIGHT: 197 LBS | HEIGHT: 66 IN

## 2022-11-02 PROCEDURE — 99214 OFFICE O/P EST MOD 30 MIN: CPT

## 2022-11-02 PROCEDURE — 93000 ELECTROCARDIOGRAM COMPLETE: CPT

## 2022-11-02 RX ORDER — ASPIRIN 81 MG/1
81 TABLET, COATED ORAL
Refills: 0 | Status: ACTIVE | COMMUNITY
Start: 2022-11-02

## 2022-11-02 RX ORDER — CLINDAMYCIN HYDROCHLORIDE 300 MG/1
300 CAPSULE ORAL
Qty: 2 | Refills: 0 | Status: DISCONTINUED | COMMUNITY
Start: 2022-09-30 | End: 2022-11-02

## 2022-11-02 RX ORDER — ASPIRIN/ACETAMINOPHEN/CAFFEINE 500-325-65
325 POWDER IN PACKET (EA) ORAL
Refills: 0 | Status: DISCONTINUED | COMMUNITY
Start: 2022-05-10 | End: 2022-11-02

## 2022-11-04 ENCOUNTER — APPOINTMENT (OUTPATIENT)
Dept: PHARMACY | Facility: CLINIC | Age: 67
End: 2022-11-04

## 2022-11-04 PROCEDURE — V5299A: CUSTOM | Mod: NC

## 2022-11-05 ENCOUNTER — RX RENEWAL (OUTPATIENT)
Age: 67
End: 2022-11-05

## 2022-11-07 ENCOUNTER — APPOINTMENT (OUTPATIENT)
Dept: INTERNAL MEDICINE | Facility: CLINIC | Age: 67
End: 2022-11-07

## 2022-11-07 VITALS
OXYGEN SATURATION: 95 % | BODY MASS INDEX: 31.98 KG/M2 | HEART RATE: 96 BPM | WEIGHT: 199 LBS | HEIGHT: 66 IN | SYSTOLIC BLOOD PRESSURE: 95 MMHG | DIASTOLIC BLOOD PRESSURE: 66 MMHG | RESPIRATION RATE: 12 BRPM

## 2022-11-07 VITALS — SYSTOLIC BLOOD PRESSURE: 104 MMHG | DIASTOLIC BLOOD PRESSURE: 60 MMHG

## 2022-11-07 PROCEDURE — 90662 IIV NO PRSV INCREASED AG IM: CPT

## 2022-11-07 PROCEDURE — G0439: CPT

## 2022-11-07 PROCEDURE — 36415 COLL VENOUS BLD VENIPUNCTURE: CPT

## 2022-11-07 PROCEDURE — G0444 DEPRESSION SCREEN ANNUAL: CPT | Mod: 59

## 2022-11-07 PROCEDURE — G0008: CPT

## 2022-11-07 PROCEDURE — 99213 OFFICE O/P EST LOW 20 MIN: CPT | Mod: 25

## 2022-11-08 LAB
ALBUMIN SERPL ELPH-MCNC: 4.2 G/DL
ALP BLD-CCNC: 74 U/L
ALT SERPL-CCNC: 14 U/L
ANION GAP SERPL CALC-SCNC: 12 MMOL/L
APPEARANCE: CLEAR
AST SERPL-CCNC: 16 U/L
BACTERIA: NEGATIVE
BASOPHILS # BLD AUTO: 0.03 K/UL
BASOPHILS NFR BLD AUTO: 0.3 %
BILIRUB SERPL-MCNC: 0.4 MG/DL
BILIRUBIN URINE: NEGATIVE
BLOOD URINE: NEGATIVE
BUN SERPL-MCNC: 21 MG/DL
CALCIUM SERPL-MCNC: 9.6 MG/DL
CHLORIDE SERPL-SCNC: 103 MMOL/L
CHOLEST SERPL-MCNC: 140 MG/DL
CO2 SERPL-SCNC: 26 MMOL/L
COLOR: YELLOW
CREAT SERPL-MCNC: 1.2 MG/DL
EGFR: 66 ML/MIN/1.73M2
EOSINOPHIL # BLD AUTO: 0.36 K/UL
EOSINOPHIL NFR BLD AUTO: 4 %
ESTIMATED AVERAGE GLUCOSE: 126 MG/DL
GLUCOSE QUALITATIVE U: NEGATIVE
GLUCOSE SERPL-MCNC: 100 MG/DL
HBA1C MFR BLD HPLC: 6 %
HCT VFR BLD CALC: 42.9 %
HDLC SERPL-MCNC: 36 MG/DL
HGB BLD-MCNC: 13.6 G/DL
HYALINE CASTS: 2 /LPF
IMM GRANULOCYTES NFR BLD AUTO: 0.2 %
KETONES URINE: NEGATIVE
LDLC SERPL CALC-MCNC: 71 MG/DL
LEUKOCYTE ESTERASE URINE: NEGATIVE
LYMPHOCYTES # BLD AUTO: 1.42 K/UL
LYMPHOCYTES NFR BLD AUTO: 15.9 %
MAN DIFF?: NORMAL
MCHC RBC-ENTMCNC: 27.1 PG
MCHC RBC-ENTMCNC: 31.7 GM/DL
MCV RBC AUTO: 85.6 FL
MICROSCOPIC-UA: NORMAL
MONOCYTES # BLD AUTO: 0.77 K/UL
MONOCYTES NFR BLD AUTO: 8.6 %
NEUTROPHILS # BLD AUTO: 6.35 K/UL
NEUTROPHILS NFR BLD AUTO: 71 %
NITRITE URINE: NEGATIVE
NONHDLC SERPL-MCNC: 103 MG/DL
PH URINE: 6.5
PLATELET # BLD AUTO: 175 K/UL
POTASSIUM SERPL-SCNC: 4.2 MMOL/L
PROT SERPL-MCNC: 7.3 G/DL
PROTEIN URINE: NORMAL
PSA SERPL-MCNC: 2.54 NG/ML
RBC # BLD: 5.01 M/UL
RBC # FLD: 15.3 %
RED BLOOD CELLS URINE: 3 /HPF
SODIUM SERPL-SCNC: 141 MMOL/L
SPECIFIC GRAVITY URINE: 1.02
SQUAMOUS EPITHELIAL CELLS: 1 /HPF
TRIGL SERPL-MCNC: 159 MG/DL
UROBILINOGEN URINE: NORMAL
WBC # FLD AUTO: 8.95 K/UL
WHITE BLOOD CELLS URINE: 2 /HPF

## 2022-11-14 ENCOUNTER — LABORATORY RESULT (OUTPATIENT)
Age: 67
End: 2022-11-14

## 2022-11-25 ENCOUNTER — RX RENEWAL (OUTPATIENT)
Age: 67
End: 2022-11-25

## 2023-01-11 ENCOUNTER — APPOINTMENT (OUTPATIENT)
Dept: INTERNAL MEDICINE | Facility: CLINIC | Age: 68
End: 2023-01-11
Payer: MEDICARE

## 2023-01-11 VITALS
HEART RATE: 82 BPM | SYSTOLIC BLOOD PRESSURE: 102 MMHG | WEIGHT: 203 LBS | BODY MASS INDEX: 32.77 KG/M2 | DIASTOLIC BLOOD PRESSURE: 71 MMHG | OXYGEN SATURATION: 97 %

## 2023-01-11 VITALS — DIASTOLIC BLOOD PRESSURE: 70 MMHG | SYSTOLIC BLOOD PRESSURE: 100 MMHG

## 2023-01-11 DIAGNOSIS — Z23 ENCOUNTER FOR IMMUNIZATION: ICD-10-CM

## 2023-01-11 DIAGNOSIS — R53.83 OTHER FATIGUE: ICD-10-CM

## 2023-01-11 PROCEDURE — G0009: CPT

## 2023-01-11 PROCEDURE — 90732 PPSV23 VACC 2 YRS+ SUBQ/IM: CPT

## 2023-01-11 PROCEDURE — 99214 OFFICE O/P EST MOD 30 MIN: CPT | Mod: 25

## 2023-02-01 ENCOUNTER — APPOINTMENT (OUTPATIENT)
Dept: CARDIOLOGY | Facility: CLINIC | Age: 68
End: 2023-02-01
Payer: MEDICARE

## 2023-02-01 ENCOUNTER — NON-APPOINTMENT (OUTPATIENT)
Age: 68
End: 2023-02-01

## 2023-02-01 VITALS
HEART RATE: 80 BPM | DIASTOLIC BLOOD PRESSURE: 77 MMHG | OXYGEN SATURATION: 95 % | SYSTOLIC BLOOD PRESSURE: 114 MMHG | BODY MASS INDEX: 32.95 KG/M2 | HEIGHT: 66 IN | WEIGHT: 205 LBS

## 2023-02-01 PROCEDURE — 93000 ELECTROCARDIOGRAM COMPLETE: CPT

## 2023-02-01 PROCEDURE — 99214 OFFICE O/P EST MOD 30 MIN: CPT

## 2023-02-01 RX ORDER — CHLORTHALIDONE 25 MG/1
25 TABLET ORAL
Qty: 45 | Refills: 3 | Status: DISCONTINUED | COMMUNITY
Start: 2022-08-16 | End: 2023-02-01

## 2023-02-21 ENCOUNTER — RX RENEWAL (OUTPATIENT)
Age: 68
End: 2023-02-21

## 2023-03-01 ENCOUNTER — RX RENEWAL (OUTPATIENT)
Age: 68
End: 2023-03-01

## 2023-03-06 NOTE — DATA REVIEWED
[address]  Dept: [phone]  Dept Fax: [phone]  Loc Appt: [phone]  Loc Fax: [phone]    Jhon Will is a 26 y.o. male who presents today for his medical conditions/complaints as noted below. Chief Complaint   Patient presents with    Wound Check     Left wrist wound check, glue fell off over the weekend and patient placed steri strips on it. HPI:     Follow up check for laceration of the left wrist.   Today denies pain    He has been wearing the compression ace and wrist splint to reduce flexion and extension of the left wrist while working. Reports no pain, laceration healing. Laceration   The incident occurred 3 to 5 days ago. Pain location: left wrist. The laceration is 2 cm in size. The laceration mechanism was a metal edge. The pain is at a severity of 0/10. The patient is experiencing no pain. Wound Check      No current outpatient medications on file. No current facility-administered medications for this visit. No Known Allergies    Subjective:      Review of Systems   Constitutional:  Negative for activity change. Restrictions of use left hand/wrist   Musculoskeletal:  Negative for arthralgias, joint swelling and myalgias. Skin:  Positive for wound. Negative for color change and pallor. Neurological:  Negative for weakness. Objective:     Pulse 94   Resp 16   Ht 6' 1\" (1.854 m)   Wt 200 lb (90.7 kg)   SpO2 98%   BMI 26.39 kg/m²     Physical Exam  Constitutional:       Appearance: Normal appearance. He is not ill-appearing or diaphoretic. Cardiovascular:      Pulses: Normal pulses. Musculoskeletal:         General: No swelling. Right hand: No swelling.       Comments: No swelling   No sensory deficit  involving the fingers or structures proximally or distally to the laceration    Cap refill of nailbeds brisk  Skin is [ink warm and dry    ROM of the left wrist normal.      Skin:     General: Skin is warm and dry. Capillary Refill: Capillary refill takes less than 2 seconds. Findings: No bruising or erythema. Comments: Glue adhesive lifted with steri strips are off. Residual healing laceration is @2 cm  with granulation tissue. Small scabbed areas B distal wound edges. slight gape of the wound edge mid wound, noted superficial and granulating    No drainage or s/s of wound infection present     Neurological:      General: No focal deficit present. Mental Status: He is alert and oriented to person, place, and time. Sensory: Sensation is intact. No sensory deficit. Deep Tendon Reflexes: Reflexes are normal and symmetric. Comments: Radial and ulnar pulses palpable and normal         Assessment:      1. Wrist laceration, left, initial encounter  Healing as per normal progress, continue dressing and wrist spliint to protect area until fully healed  Begin advancing activity. Plan:   Tissue appears may have been mosit. Slight maceration noted to the skin mid wound. Reapplied 1 steri strips after cleaning wound. Dry gauze intact  Follow up Thursday  Begin slowly advancing activity and use of left hand. Dressing change daily as previously instructed. Avoid getting wet. Follow up Thursday  Pt reports has been gradually using the left hand at home and tolerating well    All patient questions answered. Pt voiced understanding. Return in about 3 days (around 3/9/2023).              Electronically signed by ALONDRA Lew CNP on 3/6/2023 at 1:20 PM [FreeTextEntry1] : 3/14/22 TTE revealed EF 75%, Minimal mitral regurgitation. Calcified aortic valve with decreased opening. PGR 84 mm hg, MGR 56 mm hg , estimated STONE 0.6 sq cm. aortic valve velocity time integral equals 106 cm, consistent with severe aortic stenosis. Mild aortic regurgitation. Moderately dilated LT atrium. Minimal TR.\par \par 3/14/22 B/L Carotid duplex revealed mild atherosclerosis without evidence of hemodynamically significant stenosis bilaterally.

## 2023-03-15 ENCOUNTER — APPOINTMENT (OUTPATIENT)
Dept: INTERNAL MEDICINE | Facility: CLINIC | Age: 68
End: 2023-03-15
Payer: MEDICARE

## 2023-03-15 VITALS
HEIGHT: 66 IN | DIASTOLIC BLOOD PRESSURE: 83 MMHG | HEART RATE: 73 BPM | SYSTOLIC BLOOD PRESSURE: 136 MMHG | OXYGEN SATURATION: 96 % | WEIGHT: 205 LBS | RESPIRATION RATE: 12 BRPM | BODY MASS INDEX: 32.95 KG/M2

## 2023-03-15 VITALS — SYSTOLIC BLOOD PRESSURE: 126 MMHG | DIASTOLIC BLOOD PRESSURE: 82 MMHG

## 2023-03-15 PROCEDURE — 99213 OFFICE O/P EST LOW 20 MIN: CPT | Mod: 25

## 2023-03-15 RX ORDER — VIBEGRON 75 MG/1
75 TABLET, FILM COATED ORAL
Qty: 90 | Refills: 0 | Status: ACTIVE | COMMUNITY
Start: 2023-03-15

## 2023-04-06 ENCOUNTER — APPOINTMENT (OUTPATIENT)
Dept: INTERNAL MEDICINE | Facility: CLINIC | Age: 68
End: 2023-04-06
Payer: MEDICARE

## 2023-04-06 VITALS
HEART RATE: 87 BPM | BODY MASS INDEX: 33.09 KG/M2 | DIASTOLIC BLOOD PRESSURE: 71 MMHG | WEIGHT: 205 LBS | OXYGEN SATURATION: 96 % | SYSTOLIC BLOOD PRESSURE: 106 MMHG | TEMPERATURE: 99.4 F

## 2023-04-06 DIAGNOSIS — B34.9 VIRAL INFECTION, UNSPECIFIED: ICD-10-CM

## 2023-04-06 PROCEDURE — 99213 OFFICE O/P EST LOW 20 MIN: CPT | Mod: 25

## 2023-04-07 LAB
RAPID RVP RESULT: NOT DETECTED
SARS-COV-2 RNA PNL RESP NAA+PROBE: NOT DETECTED

## 2023-05-01 ENCOUNTER — APPOINTMENT (OUTPATIENT)
Dept: CARDIOLOGY | Facility: CLINIC | Age: 68
End: 2023-05-01
Payer: MEDICARE

## 2023-05-01 ENCOUNTER — NON-APPOINTMENT (OUTPATIENT)
Age: 68
End: 2023-05-01

## 2023-05-01 VITALS
BODY MASS INDEX: 32.78 KG/M2 | DIASTOLIC BLOOD PRESSURE: 69 MMHG | OXYGEN SATURATION: 98 % | WEIGHT: 204 LBS | HEIGHT: 66 IN | HEART RATE: 70 BPM | SYSTOLIC BLOOD PRESSURE: 104 MMHG

## 2023-05-01 PROCEDURE — 99214 OFFICE O/P EST MOD 30 MIN: CPT

## 2023-05-01 PROCEDURE — 93000 ELECTROCARDIOGRAM COMPLETE: CPT

## 2023-05-01 RX ORDER — HYDROCHLOROTHIAZIDE 12.5 MG/1
12.5 CAPSULE ORAL
Qty: 45 | Refills: 1 | Status: DISCONTINUED | COMMUNITY
Start: 2023-03-15 | End: 2023-05-01

## 2023-05-01 NOTE — HISTORY OF PRESENT ILLNESS
[FreeTextEntry1] : This is a 67 year old man with a history of aortic stenosis, hypertension, prediabetes, hyperlipidemia, and obesity who presents to the office for a follow up evaluation.  I repeated his echocardiogram, and he was noted to have severe AS.  He is s/p bio AVR by Dr. Cameron in  May of 2022.  His cath showed nonobstructive CAD.  He tolerated well.     His LE edema has resolved.   His WHALEY is much improved.  He denies anginal chest pains, increased dyspnea.  He does not have PND, orthopnea, LE swelling, dizziness, chest pain, syncope.  He takes his medications as prescribed, and is not noting any adverse effects.  He is checking his BP at home, and it is very well controlled.

## 2023-05-01 NOTE — REASON FOR VISIT
[Follow-Up - Clinic] : a clinic follow-up of [Aortic Stenosis] : aortic stenosis [Dyspnea] : dyspnea [Hyperlipidemia] : hyperlipidemia [Hypertension] : hypertension [Medication Management] : Medication management [Spouse] : spouse

## 2023-05-01 NOTE — PHYSICAL EXAM
[Normal Appearance] : normal appearance [General Appearance - In No Acute Distress] : no acute distress [Normal Conjunctiva] : the conjunctiva exhibited no abnormalities [Normal Oral Mucosa] : normal oral mucosa [Normal Jugular Venous V Waves Present] : normal jugular venous V waves present [Respiration, Rhythm And Depth] : normal respiratory rhythm and effort [Exaggerated Use Of Accessory Muscles For Inspiration] : no accessory muscle use [Auscultation Breath Sounds / Voice Sounds] : lungs were clear to auscultation bilaterally [Bowel Sounds] : normal bowel sounds [Abdomen Soft] : soft [Abdomen Tenderness] : non-tender [Abnormal Walk] : normal gait [Gait - Sufficient For Exercise Testing] : the gait was sufficient for exercise testing [Nail Clubbing] : no clubbing of the fingernails [Cyanosis, Localized] : no localized cyanosis [Petechial Hemorrhages (___cm)] : no petechial hemorrhages [Skin Color & Pigmentation] : normal skin color and pigmentation [] : no rash [No Venous Stasis] : no venous stasis [Skin Lesions] : no skin lesions [Oriented To Time, Place, And Person] : oriented to person, place, and time [Affect] : the affect was normal [Mood] : the mood was normal [No Anxiety] : not feeling anxious [Not Palpable] : not palpable [Normal Rate] : normal [Rhythm Regular] : regular [Normal S1] : normal S1 [Normal S2] : normal S2 [III] : a grade 3 [Crescendo-Decrescendo] : crescendo-decrescendo [High] : high pitched [Harsh] : harsh [Late] : late [Carotids] : the murmur was transmitted to the carotid arteries [2+] : left 2+ [No Pitting Edema] : no pitting edema present [FreeTextEntry1] : No JVD [Right Carotid Bruit] : no bruit heard over the right carotid [Left Carotid Bruit] : no bruit heard over the left carotid

## 2023-05-01 NOTE — DISCUSSION/SUMMARY
[FreeTextEntry1] : This is a 67 year old man with a history of aortic stenosis, hypertension, prediabetes, hyperlipidemia, and obesity who presents to the office for a follow up evaluation.  I repeated his echocardiogram, and he was noted to have severe AS.  He is s/p bio AVR by Dr. Cameron in  May of 2022.  His cath showed nonobstructive CAD.  He tolerated well.  His BP is perfectly controlled, and I Have asked him to stop chlorthalidone.   He will continue carvedilol 25 bid.   His WHALEY is much improved.  He will continue his aspirin and statin drug.  He will continue lisinopril 40 daily and carvedilol 25 bid.   I will see him back in 6  months.   We discussed the benefits of a healthy diet, regular exercise and physical activity, and weight loss in detail.

## 2023-05-11 ENCOUNTER — RX RENEWAL (OUTPATIENT)
Age: 68
End: 2023-05-11

## 2023-05-12 ENCOUNTER — APPOINTMENT (OUTPATIENT)
Dept: PHARMACY | Facility: CLINIC | Age: 68
End: 2023-05-12
Payer: SELF-PAY

## 2023-05-12 PROCEDURE — V5299A: CUSTOM | Mod: NC

## 2023-05-30 ENCOUNTER — RX RENEWAL (OUTPATIENT)
Age: 68
End: 2023-05-30

## 2023-06-15 ENCOUNTER — APPOINTMENT (OUTPATIENT)
Dept: INTERNAL MEDICINE | Facility: CLINIC | Age: 68
End: 2023-06-15
Payer: MEDICARE

## 2023-06-15 VITALS
OXYGEN SATURATION: 96 % | HEART RATE: 74 BPM | HEIGHT: 66 IN | SYSTOLIC BLOOD PRESSURE: 125 MMHG | WEIGHT: 205 LBS | DIASTOLIC BLOOD PRESSURE: 73 MMHG | RESPIRATION RATE: 12 BRPM | BODY MASS INDEX: 32.95 KG/M2

## 2023-06-15 VITALS — DIASTOLIC BLOOD PRESSURE: 70 MMHG | SYSTOLIC BLOOD PRESSURE: 120 MMHG

## 2023-06-15 PROCEDURE — 99213 OFFICE O/P EST LOW 20 MIN: CPT | Mod: 25

## 2023-06-15 PROCEDURE — 36415 COLL VENOUS BLD VENIPUNCTURE: CPT

## 2023-06-16 LAB
ALBUMIN SERPL ELPH-MCNC: 4.4 G/DL
ALP BLD-CCNC: 61 U/L
ALT SERPL-CCNC: 23 U/L
ANION GAP SERPL CALC-SCNC: 13 MMOL/L
AST SERPL-CCNC: 24 U/L
BILIRUB SERPL-MCNC: 0.7 MG/DL
BUN SERPL-MCNC: 23 MG/DL
CALCIUM SERPL-MCNC: 9.6 MG/DL
CHLORIDE SERPL-SCNC: 105 MMOL/L
CHOLEST SERPL-MCNC: 162 MG/DL
CO2 SERPL-SCNC: 26 MMOL/L
CREAT SERPL-MCNC: 1.23 MG/DL
EGFR: 64 ML/MIN/1.73M2
ESTIMATED AVERAGE GLUCOSE: 131 MG/DL
GLUCOSE SERPL-MCNC: 103 MG/DL
GLUCOSE SERPL-MCNC: 105 MG/DL
HBA1C MFR BLD HPLC: 6.2 %
HDLC SERPL-MCNC: 42 MG/DL
LDLC SERPL CALC-MCNC: 96 MG/DL
NONHDLC SERPL-MCNC: 120 MG/DL
POTASSIUM SERPL-SCNC: 4.6 MMOL/L
PROT SERPL-MCNC: 7.5 G/DL
SODIUM SERPL-SCNC: 144 MMOL/L
TRIGL SERPL-MCNC: 118 MG/DL

## 2023-06-20 NOTE — ED ADULT NURSE NOTE - NS PRO AD PATIENT TYPE
Health Care Proxy (HCP) General: + fever, +decreased PO solids, no changes in weight  HEENT: + nasal congestion, cough, rhinorrhea, sore throat  Cardio: no cyanosis  Pulm: no shortness of breath  GI: +post-tussive emesis, no vomiting, diarrhea, abdominal pain, + mild constipation   /Renal: decreased urinary frequency, no foul smelling urine  MSK: no back or extremity pain, no edema, joint pain or swelling, gait changes  Heme: no bruising or abnormal bleeding  Skin: no rash

## 2023-07-20 ENCOUNTER — RX RENEWAL (OUTPATIENT)
Age: 68
End: 2023-07-20

## 2023-08-10 ENCOUNTER — APPOINTMENT (OUTPATIENT)
Dept: INTERNAL MEDICINE | Facility: CLINIC | Age: 68
End: 2023-08-10
Payer: MEDICARE

## 2023-08-10 VITALS
DIASTOLIC BLOOD PRESSURE: 86 MMHG | BODY MASS INDEX: 34.22 KG/M2 | TEMPERATURE: 98.4 F | HEART RATE: 76 BPM | OXYGEN SATURATION: 96 % | SYSTOLIC BLOOD PRESSURE: 134 MMHG | WEIGHT: 212 LBS

## 2023-08-10 VITALS — SYSTOLIC BLOOD PRESSURE: 122 MMHG | DIASTOLIC BLOOD PRESSURE: 84 MMHG

## 2023-08-10 PROCEDURE — 99213 OFFICE O/P EST LOW 20 MIN: CPT

## 2023-08-10 RX ORDER — CHLORTHALIDONE 25 MG/1
25 TABLET ORAL
Refills: 0 | Status: DISCONTINUED | COMMUNITY
Start: 2023-05-01 | End: 2023-08-10

## 2023-08-10 NOTE — PHYSICAL EXAM
[No Acute Distress] : no acute distress [Normal] : normal rate, regular rhythm, normal S1 and S2 and no murmur heard [No Edema] : there was no peripheral edema

## 2023-08-10 NOTE — HISTORY OF PRESENT ILLNESS
[FreeTextEntry1] : Routine follow-up [de-identified] : Patient with history of hypertension presents to office for follow-up of blood pressure check. Patient recently bought a truck and has been having some problems with it has been under stress and anxiety. He has been taking his blood pressure at home and has noted an elevated systolic anywhere between 140-150. He denies any chest pain, shortness of breath or palpitation

## 2023-08-16 ENCOUNTER — APPOINTMENT (OUTPATIENT)
Dept: PULMONOLOGY | Facility: CLINIC | Age: 68
End: 2023-08-16

## 2023-08-21 NOTE — ED ADULT TRIAGE NOTE - NS ED TRIAGE CLINICAL UPGRADE
Pain - Patient was clinically upgraded due to pain.
[FreeTextEntry1] : DFU plantar left 1st metatarsal , currently closed , callused , - soi

## 2023-10-02 ENCOUNTER — APPOINTMENT (OUTPATIENT)
Dept: INTERNAL MEDICINE | Facility: CLINIC | Age: 68
End: 2023-10-02

## 2023-10-26 ENCOUNTER — RX RENEWAL (OUTPATIENT)
Age: 68
End: 2023-10-26

## 2023-10-30 NOTE — REASON FOR VISIT
Initial Evaluation] : an initial evaluation for [FreeTextEntry2] : Hearing test

## 2023-11-02 ENCOUNTER — APPOINTMENT (OUTPATIENT)
Dept: CARDIOLOGY | Facility: CLINIC | Age: 68
End: 2023-11-02
Payer: MEDICARE

## 2023-11-02 ENCOUNTER — NON-APPOINTMENT (OUTPATIENT)
Age: 68
End: 2023-11-02

## 2023-11-02 VITALS
HEART RATE: 69 BPM | BODY MASS INDEX: 34.87 KG/M2 | SYSTOLIC BLOOD PRESSURE: 158 MMHG | WEIGHT: 217 LBS | OXYGEN SATURATION: 97 % | DIASTOLIC BLOOD PRESSURE: 88 MMHG | HEIGHT: 66 IN

## 2023-11-02 VITALS — SYSTOLIC BLOOD PRESSURE: 132 MMHG | DIASTOLIC BLOOD PRESSURE: 80 MMHG

## 2023-11-02 DIAGNOSIS — L30.9 DERMATITIS, UNSPECIFIED: ICD-10-CM

## 2023-11-02 PROCEDURE — 93000 ELECTROCARDIOGRAM COMPLETE: CPT

## 2023-11-02 PROCEDURE — 99214 OFFICE O/P EST MOD 30 MIN: CPT

## 2023-11-02 RX ORDER — OSELTAMIVIR PHOSPHATE 75 MG/1
75 CAPSULE ORAL TWICE DAILY
Qty: 10 | Refills: 0 | Status: DISCONTINUED | COMMUNITY
Start: 2023-04-06 | End: 2023-11-02

## 2023-11-03 ENCOUNTER — NON-APPOINTMENT (OUTPATIENT)
Age: 68
End: 2023-11-03

## 2023-11-03 ENCOUNTER — APPOINTMENT (OUTPATIENT)
Dept: PHARMACY | Facility: CLINIC | Age: 68
End: 2023-11-03

## 2023-11-03 ENCOUNTER — APPOINTMENT (OUTPATIENT)
Dept: OTOLARYNGOLOGY | Facility: CLINIC | Age: 68
End: 2023-11-03
Payer: MEDICARE

## 2023-11-03 VITALS
SYSTOLIC BLOOD PRESSURE: 134 MMHG | HEIGHT: 66 IN | DIASTOLIC BLOOD PRESSURE: 84 MMHG | WEIGHT: 203.19 LBS | BODY MASS INDEX: 32.66 KG/M2 | HEART RATE: 73 BPM

## 2023-11-03 DIAGNOSIS — H91.10 PRESBYCUSIS, UNSPECIFIED EAR: ICD-10-CM

## 2023-11-03 PROCEDURE — 99213 OFFICE O/P EST LOW 20 MIN: CPT

## 2023-11-03 PROCEDURE — 92557 COMPREHENSIVE HEARING TEST: CPT

## 2023-11-03 PROCEDURE — 92567 TYMPANOMETRY: CPT

## 2023-11-06 ENCOUNTER — APPOINTMENT (OUTPATIENT)
Dept: INTERNAL MEDICINE | Facility: CLINIC | Age: 68
End: 2023-11-06
Payer: MEDICARE

## 2023-11-06 VITALS
HEIGHT: 66 IN | BODY MASS INDEX: 32.78 KG/M2 | OXYGEN SATURATION: 97 % | WEIGHT: 204 LBS | HEART RATE: 67 BPM | SYSTOLIC BLOOD PRESSURE: 134 MMHG | DIASTOLIC BLOOD PRESSURE: 79 MMHG | RESPIRATION RATE: 12 BRPM

## 2023-11-06 VITALS — DIASTOLIC BLOOD PRESSURE: 100 MMHG | SYSTOLIC BLOOD PRESSURE: 150 MMHG

## 2023-11-06 DIAGNOSIS — Z23 ENCOUNTER FOR IMMUNIZATION: ICD-10-CM

## 2023-11-06 PROCEDURE — 99214 OFFICE O/P EST MOD 30 MIN: CPT | Mod: 25

## 2023-11-06 PROCEDURE — 36415 COLL VENOUS BLD VENIPUNCTURE: CPT

## 2023-11-06 PROCEDURE — 90662 IIV NO PRSV INCREASED AG IM: CPT

## 2023-11-06 PROCEDURE — G0008: CPT

## 2023-11-06 RX ORDER — HYDROCHLOROTHIAZIDE 12.5 MG/1
12.5 TABLET ORAL DAILY
Qty: 45 | Refills: 3 | Status: DISCONTINUED | COMMUNITY
Start: 2023-11-06 | End: 2023-11-06

## 2023-11-07 ENCOUNTER — APPOINTMENT (OUTPATIENT)
Dept: CARDIOLOGY | Facility: CLINIC | Age: 68
End: 2023-11-07
Payer: MEDICARE

## 2023-11-07 PROCEDURE — 93306 TTE W/DOPPLER COMPLETE: CPT

## 2023-11-13 LAB
ALBUMIN SERPL ELPH-MCNC: 4.3 G/DL
ALP BLD-CCNC: 61 U/L
ALT SERPL-CCNC: 19 U/L
ANION GAP SERPL CALC-SCNC: 13 MMOL/L
AST SERPL-CCNC: 19 U/L
BILIRUB SERPL-MCNC: 0.5 MG/DL
BUN SERPL-MCNC: 16 MG/DL
CALCIUM SERPL-MCNC: 9.7 MG/DL
CHLORIDE SERPL-SCNC: 104 MMOL/L
CHOLEST SERPL-MCNC: 139 MG/DL
CO2 SERPL-SCNC: 28 MMOL/L
CREAT SERPL-MCNC: 1.03 MG/DL
EGFR: 79 ML/MIN/1.73M2
ESTIMATED AVERAGE GLUCOSE: 123 MG/DL
GLUCOSE SERPL-MCNC: 88 MG/DL
GLUCOSE SERPL-MCNC: 91 MG/DL
HBA1C MFR BLD HPLC: 5.9 %
HDLC SERPL-MCNC: 42 MG/DL
LDLC SERPL CALC-MCNC: 75 MG/DL
NONHDLC SERPL-MCNC: 97 MG/DL
POTASSIUM SERPL-SCNC: 4.8 MMOL/L
PROT SERPL-MCNC: 7.5 G/DL
SODIUM SERPL-SCNC: 145 MMOL/L
TRIGL SERPL-MCNC: 125 MG/DL

## 2023-12-02 ENCOUNTER — RX RENEWAL (OUTPATIENT)
Age: 68
End: 2023-12-02

## 2023-12-11 ENCOUNTER — RX RENEWAL (OUTPATIENT)
Age: 68
End: 2023-12-11

## 2023-12-12 ENCOUNTER — APPOINTMENT (OUTPATIENT)
Dept: INTERNAL MEDICINE | Facility: CLINIC | Age: 68
End: 2023-12-12
Payer: MEDICARE

## 2023-12-12 ENCOUNTER — RX RENEWAL (OUTPATIENT)
Age: 68
End: 2023-12-12

## 2023-12-12 VITALS
DIASTOLIC BLOOD PRESSURE: 80 MMHG | WEIGHT: 205 LBS | OXYGEN SATURATION: 97 % | BODY MASS INDEX: 33.09 KG/M2 | SYSTOLIC BLOOD PRESSURE: 125 MMHG | HEART RATE: 75 BPM

## 2023-12-12 VITALS — SYSTOLIC BLOOD PRESSURE: 126 MMHG | DIASTOLIC BLOOD PRESSURE: 78 MMHG

## 2023-12-12 PROCEDURE — 99213 OFFICE O/P EST LOW 20 MIN: CPT

## 2024-01-14 NOTE — ED ADULT NURSE NOTE - CAS EDP DISCH DISPOSITION ADMI
Workers Compensation INITIAL Office Visit    Employer:     Date of Injury:     Subjective      CHIEF COMPLAINT:   Chief Complaint   Patient presents with   • Worker's Compensation     Left hand injury   01/14/2024  [employer]   • Fall     Pt was moving a  up a ramp and fell he hurt his left hand.        HPI:   Clayton Renae is a 53 year old male, who presents with a complaint of an injury that reportedly occurred during work activities.  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       Mechanism of Injury:  He states that while he was at work on 1/14/2024, he was pushing a snowblower against ramp, but the ramp removed, the snowblower moved backwards, he fell on his knees, used his hands to break the fall, but after he got up, he realized there is pain in the left hand, there is also bruise, and swelling, he does not know exactly what happened, he does not remember anything hit the left hand.  There is no other injury.    He denies any other precipitating event or activity.  He has no history of previous problems    ROS:   Review of Systems   See HPI     PAST HISTORY:   I have reviewed the patient's medications and allergies, past medical, surgical, social and family history, updating these as appropriate.  See Histories section of the EMR for a display of this information.      PE:   Clayton is a well developed male, who appears in no acute distress.    He is awake, alert and cooperative and pleasant.   Vitals:    01/14/24 1204   BP: (!) 160/120   Pulse:    Resp:    Temp:      Physical Exam  Constitutional:       General: He is not in acute distress.     Appearance: He is not ill-appearing or diaphoretic.   Cardiovascular:      Rate and Rhythm: Normal rate and regular rhythm.      Heart sounds: Normal heart sounds.   Pulmonary:      Effort: Pulmonary effort is normal.      Breath sounds: Normal breath sounds.   Musculoskeletal:         General: Swelling, tenderness and  signs of injury present. No deformity.      Comments: There is mild to moderate swelling in the dorsum of the left hand between the first and the second and the third metacarpal bones, with bruise, and tenderness in that area.  There is also mild tenderness in the left thumb at the MP joint without noticeable swelling or bruise.  There is no tenderness or bruising other parts of the left hand and fingers.  There is no tenderness, swelling, discoloration in his left wrist.   Skin:     Findings: Bruising present. No erythema or rash.   Neurological:      Mental Status: He is alert.          DIAGNOSIS:   1. Left hand pain    2. Fall, initial encounter        RETURN TO WORK:Employee may return to work with restrictions.                RESTRICTIONS: Restrictions are to be followed at work and at home. Restrictions are in effect until next follow-up visit.  No pushing or pulling in the left hand      TREATMENT PLAN:   Medications for this injury/condition: Tylenol or Motrin as needed for pain  Referral/Consult: ?None  Diagnostic Testing: XR HAND 3 OR MORE VIEWS LEFT   Drug test not required.  Breath alcohol test not required.      Instructions: - Rest  - Ice packs can be applied to areas of swelling for 10-15 minutes every 3-4 hours while the patient is awake for the first 24-48 hours.   - Elevate the extremity as much as possible to help with swelling  - Alternate with Acetaminophen and Ibuprofen as needed for pain.  - Encourage fluid intake.  - ACE wrapping  - check circulation and sensation of extremity regularly, loosen the ACE wrapping if it is too tight      Discussed with the patient or patient guardian about the x-ray findings.  Recommendation was given regarding treatment.  Per my preliminary interpretation of the x-ray: There is  No fracture or dislocation.  Waiting for official reading    FOLLOW-UP: Return in about 3 days (around 1/17/2024).  He is to follow-up sooner if his symptoms should get worse.     See  Return to Work Report for further information.    Pito Mulligan MD  1/14/2024     Telemetry

## 2024-01-30 ENCOUNTER — RX RENEWAL (OUTPATIENT)
Age: 69
End: 2024-01-30

## 2024-03-05 ENCOUNTER — APPOINTMENT (OUTPATIENT)
Dept: INTERNAL MEDICINE | Facility: CLINIC | Age: 69
End: 2024-03-05
Payer: MEDICARE

## 2024-03-05 VITALS
HEART RATE: 79 BPM | RESPIRATION RATE: 12 BRPM | BODY MASS INDEX: 33.59 KG/M2 | HEIGHT: 66 IN | WEIGHT: 209 LBS | DIASTOLIC BLOOD PRESSURE: 82 MMHG | SYSTOLIC BLOOD PRESSURE: 138 MMHG | OXYGEN SATURATION: 95 %

## 2024-03-05 VITALS — DIASTOLIC BLOOD PRESSURE: 80 MMHG | SYSTOLIC BLOOD PRESSURE: 120 MMHG

## 2024-03-05 PROCEDURE — 99214 OFFICE O/P EST MOD 30 MIN: CPT

## 2024-03-05 PROCEDURE — 36415 COLL VENOUS BLD VENIPUNCTURE: CPT

## 2024-03-05 RX ORDER — CARVEDILOL 25 MG/1
25 TABLET, FILM COATED ORAL
Qty: 180 | Refills: 1 | Status: ACTIVE | COMMUNITY
Start: 2022-06-28 | End: 1900-01-01

## 2024-03-05 NOTE — PHYSICAL EXAM
[No Acute Distress] : no acute distress [No Lymphadenopathy] : no lymphadenopathy [Thyroid Normal, No Nodules] : the thyroid was normal and there were no nodules present [No Carotid Bruits] : no carotid bruits [No Abdominal Bruit] : a ~M bruit was not heard ~T in the abdomen [No Edema] : there was no peripheral edema [Normal] : soft, non-tender, non-distended, no masses palpated, no HSM and normal bowel sounds [Normal Supraclavicular Nodes] : no supraclavicular lymphadenopathy [Normal Posterior Cervical Nodes] : no posterior cervical lymphadenopathy [Normal Anterior Cervical Nodes] : no anterior cervical lymphadenopathy [No CVA Tenderness] : no CVA  tenderness

## 2024-03-05 NOTE — HISTORY OF PRESENT ILLNESS
[FreeTextEntry1] : Routine follow up [de-identified] : Patient with history of coronary artery disease hypertension, hyperlipidemia, prediabetes, status post aortic valve replacement presents to office for routine follow-up. Overall he feels well and denies any chest pain, shortness of breath, palpitations and states compliance with all medication. He is attempting to watch his diet but was in [place] recently taking care of his ill brother and was eating out a lot

## 2024-03-05 NOTE — ASSESSMENT
[FreeTextEntry1] : Stable Continue present medication Advise regular aerobic exercise and heart healthy diet Check routine blood work

## 2024-03-06 LAB
ALBUMIN SERPL ELPH-MCNC: 4.2 G/DL
ALP BLD-CCNC: 63 U/L
ALT SERPL-CCNC: 25 U/L
ANION GAP SERPL CALC-SCNC: 15 MMOL/L
AST SERPL-CCNC: 22 U/L
BILIRUB SERPL-MCNC: 0.5 MG/DL
BUN SERPL-MCNC: 20 MG/DL
CALCIUM SERPL-MCNC: 9.3 MG/DL
CHLORIDE SERPL-SCNC: 103 MMOL/L
CHOLEST SERPL-MCNC: 156 MG/DL
CO2 SERPL-SCNC: 25 MMOL/L
CREAT SERPL-MCNC: 1.08 MG/DL
EGFR: 74 ML/MIN/1.73M2
ESTIMATED AVERAGE GLUCOSE: 128 MG/DL
GLUCOSE SERPL-MCNC: 109 MG/DL
GLUCOSE SERPL-MCNC: 110 MG/DL
HBA1C MFR BLD HPLC: 6.1 %
HDLC SERPL-MCNC: 41 MG/DL
LDLC SERPL CALC-MCNC: 90 MG/DL
NONHDLC SERPL-MCNC: 116 MG/DL
POTASSIUM SERPL-SCNC: 4.4 MMOL/L
PROT SERPL-MCNC: 7.6 G/DL
SODIUM SERPL-SCNC: 143 MMOL/L
TRIGL SERPL-MCNC: 148 MG/DL

## 2024-04-03 ENCOUNTER — NON-APPOINTMENT (OUTPATIENT)
Age: 69
End: 2024-04-03

## 2024-05-09 ENCOUNTER — APPOINTMENT (OUTPATIENT)
Dept: CARDIOLOGY | Facility: CLINIC | Age: 69
End: 2024-05-09
Payer: MEDICARE

## 2024-05-09 VITALS
HEIGHT: 66 IN | SYSTOLIC BLOOD PRESSURE: 121 MMHG | HEART RATE: 65 BPM | WEIGHT: 203 LBS | BODY MASS INDEX: 32.62 KG/M2 | OXYGEN SATURATION: 98 % | DIASTOLIC BLOOD PRESSURE: 79 MMHG

## 2024-05-09 DIAGNOSIS — I35.0 NONRHEUMATIC AORTIC (VALVE) STENOSIS: ICD-10-CM

## 2024-05-09 DIAGNOSIS — E78.5 HYPERLIPIDEMIA, UNSPECIFIED: ICD-10-CM

## 2024-05-09 PROCEDURE — 99214 OFFICE O/P EST MOD 30 MIN: CPT

## 2024-05-09 PROCEDURE — 93000 ELECTROCARDIOGRAM COMPLETE: CPT

## 2024-05-09 PROCEDURE — G2211 COMPLEX E/M VISIT ADD ON: CPT

## 2024-05-09 RX ORDER — CLINDAMYCIN HYDROCHLORIDE 300 MG/1
300 CAPSULE ORAL
Qty: 2 | Refills: 0 | Status: DISCONTINUED | COMMUNITY
Start: 2023-04-03 | End: 2024-05-09

## 2024-05-09 NOTE — HISTORY OF PRESENT ILLNESS
[FreeTextEntry1] : This is a 68 year old man with a history of aortic stenosis, hypertension, prediabetes, hyperlipidemia, and obesity who presents to the office for a follow up evaluation.  I repeated his echocardiogram, and he was noted to have severe AS.  He is s/p bio AVR by Dr. Cameron in  May of 2022.  His cath showed nonobstructive CAD.  He tolerated well.     His LE edema has resolved.   His WHALEY is much improved.  He denies anginal chest pains, increased dyspnea.  He does not have PND, orthopnea, LE swelling, dizziness, chest pain, syncope.  He takes his medications as prescribed, and is not noting any adverse effects.  He is checking his BP at home, and it is very well controlled.

## 2024-05-09 NOTE — DISCUSSION/SUMMARY
[FreeTextEntry1] : This is a 68 year old man with a history of aortic stenosis, hypertension, prediabetes, hyperlipidemia, and obesity who presents to the office for a follow up evaluation.  I repeated his echocardiogram, and he was noted to have severe AS.  He is s/p bio AVR by Dr. Cameron in  May of 2022.  His cath showed nonobstructive CAD.  He tolerated well.  His BP is controlled.   He will continue carvedilol 25 bid.   His WHALEY is much improved.  He will continue his aspirin and statin drug.  He will continue lisinopril 40 daily and carvedilol 25 bid.   Last echocardiogram showed a well seated bio AVR.  I will see him back in 6  months.   We discussed the benefits of a healthy diet, regular exercise and physical activity, and weight loss in detail.  [EKG obtained to assist in diagnosis and management of assessed problem(s)] : EKG obtained to assist in diagnosis and management of assessed problem(s)

## 2024-05-22 ENCOUNTER — APPOINTMENT (OUTPATIENT)
Dept: INTERNAL MEDICINE | Facility: CLINIC | Age: 69
End: 2024-05-22
Payer: MEDICARE

## 2024-05-22 ENCOUNTER — LABORATORY RESULT (OUTPATIENT)
Age: 69
End: 2024-05-22

## 2024-05-22 VITALS
OXYGEN SATURATION: 96 % | DIASTOLIC BLOOD PRESSURE: 75 MMHG | SYSTOLIC BLOOD PRESSURE: 115 MMHG | HEART RATE: 66 BPM | TEMPERATURE: 98.1 F | WEIGHT: 203 LBS | BODY MASS INDEX: 32.77 KG/M2

## 2024-05-22 DIAGNOSIS — I25.10 ATHEROSCLEROTIC HEART DISEASE OF NATIVE CORONARY ARTERY W/OUT ANGINA PECTORIS: ICD-10-CM

## 2024-05-22 PROCEDURE — 99214 OFFICE O/P EST MOD 30 MIN: CPT

## 2024-05-22 PROCEDURE — 36415 COLL VENOUS BLD VENIPUNCTURE: CPT

## 2024-05-22 RX ORDER — DOXYCYCLINE 100 MG/1
100 TABLET, FILM COATED ORAL AS DIRECTED
Qty: 2 | Refills: 0 | Status: ACTIVE | COMMUNITY
Start: 2024-05-22 | End: 1900-01-01

## 2024-05-22 NOTE — PHYSICAL EXAM
[No Acute Distress] : no acute distress [Normal Sclera/Conjunctiva] : normal sclera/conjunctiva [No Lymphadenopathy] : no lymphadenopathy [Thyroid Normal, No Nodules] : the thyroid was normal and there were no nodules present [Normal] : soft, non-tender, non-distended, no masses palpated, no HSM and normal bowel sounds [Normal Supraclavicular Nodes] : no supraclavicular lymphadenopathy [Normal Axillary Nodes] : no axillary lymphadenopathy [Normal Posterior Cervical Nodes] : no posterior cervical lymphadenopathy [Normal Anterior Cervical Nodes] : no anterior cervical lymphadenopathy [No Focal Deficits] : no focal deficits [de-identified] : Positive bull's-eye appearing like rash at site of tick insertion

## 2024-05-22 NOTE — HISTORY OF PRESENT ILLNESS
[FreeTextEntry1] : Tick bite [de-identified] : Patient well working on his [employer] and after cutting the grass this morning he noted a tick which she removed from his left forearm. The tick was apparently attached less than 24 hours He denies any fever, chills, night sweats.  He does have a bull's-eye looking erythema at the tick site The tick was recovered and will be sent out for analysis

## 2024-05-24 ENCOUNTER — APPOINTMENT (OUTPATIENT)
Dept: INTERNAL MEDICINE | Facility: CLINIC | Age: 69
End: 2024-05-24
Payer: MEDICARE

## 2024-05-24 VITALS
TEMPERATURE: 98.1 F | BODY MASS INDEX: 32.77 KG/M2 | OXYGEN SATURATION: 96 % | HEART RATE: 69 BPM | WEIGHT: 203 LBS | SYSTOLIC BLOOD PRESSURE: 120 MMHG | DIASTOLIC BLOOD PRESSURE: 80 MMHG
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
## 2024-05-24 PROCEDURE — 99213 OFFICE O/P EST LOW 20 MIN: CPT

## 2024-05-24 NOTE — PHYSICAL EXAM
[No Acute Distress] : no acute distress [Normal] : normal rate, regular rhythm, normal S1 and S2 and no murmur heard [Normal Supraclavicular Nodes] : no supraclavicular lymphadenopathy [Normal Axillary Nodes] : no axillary lymphadenopathy [Normal Posterior Cervical Nodes] : no posterior cervical lymphadenopathy [Normal Anterior Cervical Nodes] : no anterior cervical lymphadenopathy [de-identified] : Round rash that was present left arm has resolved

## 2024-05-24 NOTE — HISTORY OF PRESENT ILLNESS
[FreeTextEntry1] : Routine follow-up [de-identified] : Patient returns to office following a tick bite He denies any fever, chills, chest pains, shortness of breath Patient was prescribed doxycycline 200 mg p.o. x 1 Initial Lyme titer was negative Small oval rash left arm has resolved

## 2024-05-28 LAB — TICK ID W/ REFLEX TO LYME PCR, TICK: ABNORMAL

## 2024-05-30 ENCOUNTER — RX RENEWAL (OUTPATIENT)
Age: 69
End: 2024-05-30

## 2024-05-30 RX ORDER — CHLORTHALIDONE 25 MG/1
25 TABLET ORAL
Qty: 45 | Refills: 1 | Status: ACTIVE | COMMUNITY
Start: 2023-11-06 | End: 1900-01-01

## 2024-06-05 ENCOUNTER — LABORATORY RESULT (OUTPATIENT)
Age: 69
End: 2024-06-05

## 2024-06-06 LAB
ALBUMIN SERPL ELPH-MCNC: 4 G/DL
ALP BLD-CCNC: 48 U/L
ALT SERPL-CCNC: 22 U/L
ANION GAP SERPL CALC-SCNC: 9 MMOL/L
APPEARANCE: CLEAR
AST SERPL-CCNC: 20 U/L
BACTERIA: NEGATIVE /HPF
BASOPHILS # BLD AUTO: 0.03 K/UL
BASOPHILS NFR BLD AUTO: 0.4 %
BILIRUB SERPL-MCNC: 0.6 MG/DL
BILIRUBIN URINE: NEGATIVE
BLOOD URINE: NEGATIVE
BUN SERPL-MCNC: 22 MG/DL
CALCIUM SERPL-MCNC: 8.9 MG/DL
CAST: 0 /LPF
CHLORIDE SERPL-SCNC: 106 MMOL/L
CHOLEST SERPL-MCNC: 145 MG/DL
CO2 SERPL-SCNC: 27 MMOL/L
COLOR: YELLOW
CREAT SERPL-MCNC: 1.1 MG/DL
EGFR: 72 ML/MIN/1.73M2
EOSINOPHIL # BLD AUTO: 0.25 K/UL
EOSINOPHIL NFR BLD AUTO: 3.7 %
EPITHELIAL CELLS: 0 /HPF
ESTIMATED AVERAGE GLUCOSE: 134 MG/DL
GLUCOSE QUALITATIVE U: NEGATIVE MG/DL
GLUCOSE SERPL-MCNC: 101 MG/DL
GLUCOSE SERPL-MCNC: 105 MG/DL
HBA1C MFR BLD HPLC: 6.3 %
HCT VFR BLD CALC: 44.7 %
HDLC SERPL-MCNC: 41 MG/DL
HGB BLD-MCNC: 14.5 G/DL
IMM GRANULOCYTES NFR BLD AUTO: 0.1 %
KETONES URINE: NEGATIVE MG/DL
LDLC SERPL CALC-MCNC: 84 MG/DL
LEUKOCYTE ESTERASE URINE: NEGATIVE
LYMPHOCYTES # BLD AUTO: 1.82 K/UL
LYMPHOCYTES NFR BLD AUTO: 27.2 %
MAN DIFF?: NORMAL
MCHC RBC-ENTMCNC: 27.3 PG
MCHC RBC-ENTMCNC: 32.4 GM/DL
MCV RBC AUTO: 84 FL
MICROSCOPIC-UA: NORMAL
MONOCYTES # BLD AUTO: 0.58 K/UL
MONOCYTES NFR BLD AUTO: 8.7 %
NEUTROPHILS # BLD AUTO: 4 K/UL
NEUTROPHILS NFR BLD AUTO: 59.9 %
NITRITE URINE: NEGATIVE
NONHDLC SERPL-MCNC: 104 MG/DL
PH URINE: 5.5
PLATELET # BLD AUTO: 132 K/UL
POTASSIUM SERPL-SCNC: 4.3 MMOL/L
PROT SERPL-MCNC: 7 G/DL
PROTEIN URINE: NEGATIVE MG/DL
PSA SERPL-MCNC: 3.05 NG/ML
RBC # BLD: 5.32 M/UL
RBC # FLD: 14.6 %
RED BLOOD CELLS URINE: 1 /HPF
SODIUM SERPL-SCNC: 141 MMOL/L
SPECIFIC GRAVITY URINE: 1.02
TRIGL SERPL-MCNC: 107 MG/DL
UROBILINOGEN URINE: 0.2 MG/DL
WBC # FLD AUTO: 6.69 K/UL
WHITE BLOOD CELLS URINE: 0 /HPF

## 2024-06-10 ENCOUNTER — APPOINTMENT (OUTPATIENT)
Dept: INTERNAL MEDICINE | Facility: CLINIC | Age: 69
End: 2024-06-10
Payer: MEDICARE

## 2024-06-10 VITALS
OXYGEN SATURATION: 97 % | RESPIRATION RATE: 12 BRPM | HEART RATE: 70 BPM | BODY MASS INDEX: 33.59 KG/M2 | HEIGHT: 66 IN | WEIGHT: 209 LBS | DIASTOLIC BLOOD PRESSURE: 82 MMHG | SYSTOLIC BLOOD PRESSURE: 124 MMHG

## 2024-06-10 DIAGNOSIS — Z00.00 ENCOUNTER FOR GENERAL ADULT MEDICAL EXAMINATION W/OUT ABNORMAL FINDINGS: ICD-10-CM

## 2024-06-10 DIAGNOSIS — E78.5 HYPERLIPIDEMIA, UNSPECIFIED: ICD-10-CM

## 2024-06-10 DIAGNOSIS — I10 ESSENTIAL (PRIMARY) HYPERTENSION: ICD-10-CM

## 2024-06-10 DIAGNOSIS — R73.03 PREDIABETES.: ICD-10-CM

## 2024-06-10 DIAGNOSIS — Z95.2 PRESENCE OF PROSTHETIC HEART VALVE: ICD-10-CM

## 2024-06-10 PROCEDURE — 36415 COLL VENOUS BLD VENIPUNCTURE: CPT

## 2024-06-10 PROCEDURE — 93000 ELECTROCARDIOGRAM COMPLETE: CPT | Mod: 59

## 2024-06-10 PROCEDURE — G0439: CPT

## 2024-06-10 PROCEDURE — G0444 DEPRESSION SCREEN ANNUAL: CPT | Mod: 59

## 2024-06-10 PROCEDURE — 99213 OFFICE O/P EST LOW 20 MIN: CPT | Mod: 25

## 2024-06-10 NOTE — PHYSICAL EXAM
[No Acute Distress] : no acute distress [Normal Sclera/Conjunctiva] : normal sclera/conjunctiva [PERRL] : pupils equal round and reactive to light [EOMI] : extraocular movements intact [Normal Oropharynx] : the oropharynx was normal [No Lymphadenopathy] : no lymphadenopathy [Thyroid Normal, No Nodules] : the thyroid was normal and there were no nodules present [Regular Rhythm] : with a regular rhythm [Normal S1, S2] : normal S1 and S2 [No Carotid Bruits] : no carotid bruits [No Abdominal Bruit] : a ~M bruit was not heard ~T in the abdomen [No Varicosities] : no varicosities [No Edema] : there was no peripheral edema [No Palpable Aorta] : no palpable aorta [Normal Appearance] : normal in appearance [Declined Rectal Exam] : declined rectal exam [Normal] : no posterior cervical lymphadenopathy and no anterior cervical lymphadenopathy [No CVA Tenderness] : no CVA  tenderness [No Spinal Tenderness] : no spinal tenderness [No Rash] : no rash [Coordination Grossly Intact] : coordination grossly intact [No Focal Deficits] : no focal deficits [Normal Affect] : the affect was normal [Normal Insight/Judgement] : insight and judgment were intact [de-identified] : 2/6 systolic murmur heard best at the right sternal border [FreeTextEntry1] : By urology

## 2024-06-10 NOTE — HEALTH RISK ASSESSMENT
[Little interest or pleasure doing things] : 1) Little interest or pleasure doing things [Feeling down, depressed, or hopeless] : 2) Feeling down, depressed, or hopeless [0] : 2) Feeling down, depressed, or hopeless: Not at all (0) [PHQ-2 Negative - No further assessment needed] : PHQ-2 Negative - No further assessment needed [[id number]] : 0 [ColonoscopyComments] : 3 to 4 years ago, due again

## 2024-06-10 NOTE — HISTORY OF PRESENT ILLNESS
[de-identified] : Patient with history of hypertension, hyperlipidemia, prediabetes, coronary artery disease, status post aortic valve replacement presents to office for annual wellness exam. Overall he feels well and denies any chest pain, shortness of breath, palpitations, or change in bowel habits He has gained some weight over the last year Recent blood test results have been reviewed with patient, platelet count was noted to be slightly reduced.  He denies any easy bruising or bleeding

## 2024-06-10 NOTE — ASSESSMENT
[FreeTextEntry1] : Stable Continue present medication Repeat platelet count Advised weight reduction, regular aerobic exercise and heart healthy diet

## 2024-06-11 DIAGNOSIS — D69.6 THROMBOCYTOPENIA, UNSPECIFIED: ICD-10-CM

## 2024-06-11 LAB
BASOPHILS # BLD AUTO: 0.03 K/UL
BASOPHILS NFR BLD AUTO: 0.4 %
EOSINOPHIL # BLD AUTO: 0.21 K/UL
EOSINOPHIL NFR BLD AUTO: 2.5 %
HCT VFR BLD CALC: 44.6 %
HGB BLD-MCNC: 14.3 G/DL
IMM GRANULOCYTES NFR BLD AUTO: 0.2 %
LYMPHOCYTES # BLD AUTO: 2.19 K/UL
LYMPHOCYTES NFR BLD AUTO: 25.9 %
MAN DIFF?: NORMAL
MCHC RBC-ENTMCNC: 27.9 PG
MCHC RBC-ENTMCNC: 32.1 GM/DL
MCV RBC AUTO: 87.1 FL
MONOCYTES # BLD AUTO: 0.55 K/UL
MONOCYTES NFR BLD AUTO: 6.5 %
NEUTROPHILS # BLD AUTO: 5.44 K/UL
NEUTROPHILS NFR BLD AUTO: 64.5 %
PLATELET # BLD AUTO: 130 K/UL
RBC # BLD: 5.12 M/UL
RBC # FLD: 14.6 %
WBC # FLD AUTO: 8.44 K/UL

## 2024-06-12 RX ORDER — ROSUVASTATIN CALCIUM 20 MG/1
20 TABLET, FILM COATED ORAL
Qty: 90 | Refills: 0 | Status: ACTIVE | COMMUNITY
Start: 2020-08-21 | End: 1900-01-01

## 2024-08-02 ENCOUNTER — RX RENEWAL (OUTPATIENT)
Age: 69
End: 2024-08-02

## 2024-08-30 ENCOUNTER — RX RENEWAL (OUTPATIENT)
Age: 69
End: 2024-08-30

## 2024-09-04 NOTE — H&P CARDIOLOGY - WEIGHT IN KG
Chief complaint:  Chief Complaint   Patient presents with   • Office Visit   • Lesion     This is a consult sent by Nick Martin MD  for Personal history of malignant melanoma of skin. Evaluation and treatment/recommendations are in the progress note below. This note will be forwarded to the referring provider.    HPI:  The patient is a new 60 year old male.    The patient presents for the following lesion spot of concerns on the right cheek that was biopsied in 2020, solar keratosis. He say it can be painful whenever he is under the sun. No bleeding.     The melanoma was Breslow depth in situ.  Treatment date was: 7/14/2020    LOCATION: Right upper arm   DURATION: The lesion was excised on the following date:  7/14/2020  QUALITY: The surgical site is is asymptomatic    MODIFYING FACTORS:     The patient uses sunscreen SPF at least 30 daily  YES  The patient avoids the peak sun hours   YES  The patient wears sun protective clothing   YES  The patient performs monthly self examinations  YES    ASSOCIATED SIGNS OR SYMPTOMS:   The patient denies any new or changing lesions:  no      ROS:  Constitutional:   The patient denies unintentional weight loss  YES  The patient denies unexplained fatigue:  YES  The patient denies new or persistent cough:  YES  The patient denies new abdominal pain:  YES  The patient denies new or worsening headaches:  YES  The patient denies bumps under the skin:  YES    ROS:  Cardiovascular:  The patient has a pacemaker:  No  The patient has a defibrillator:  No  Hematologic:  The patient bleeds easily because of being on aspirin or an anticoagulant:  No    The past medial history:  The patient has a history of immunosuppression (transplant, HIV, leukemia, lymphoma, immunosuppressant medications): No  The patient has a history of chemotherapy and/or radiation: No      ALLERGIES:  No Known Allergies    Current Outpatient Medications   Medication Sig Dispense Refill   • betamethasone valerate  (VALISONE) 0.1 % ointment Apply pea size amount topically to affected areas on extremities twice daily x 3 weeks with 1 week break. Repeat as needed. Avoid face, armpits, and groin. 30 g 3   • betamethasone dipropionate (DIPROSONE) 0.05 % cream Apply topically 2 times daily. 30 g 0   • VITAMIN D, CHOLECALCIFEROL, PO  (Patient not taking: Reported on 9/4/2024)     • loteprednol (LOTEMAX) 0.5 % ophthalmic suspension Apply 1 drop to eye. (Patient not taking: Reported on 9/4/2024)       No current facility-administered medications for this visit.       Past Medical History:   Diagnosis Date   • Palpitations      DERMATOLOGY PMH:    Biopsy date Cancer Type Subtype Location Treatment Treatment Date   7/2/2020 Dr. Agus Wilkerson  Melanoma In situ Right upper arm Excision  7/14/2020 Tolu Duncan     FH: The patient has a family history of melanoma: No    SH: , Rony    PHYSICAL EXAM:    (lentigines) numerous tan to brown macules in areas of sun exposure back    (sk) tan to brown stuck on papules/plaques arms, arms, chest, abdomen, legs    (IDN) fleshy dermal papule back, arms, chest, abdomen    (CMP) tan/brown flat papule w/o concerning features back, arms, chest, abdomen, legs    (cherry) cherry red papules neck, arms, legs    (AK) Pink, gritty papules  Right malar cheek x 2    Right distal thigh, ill define erythematous patch       Assessment and Plan:    1. Screening for skin cancer    2. Cherry angiomas  -  Reassurance that these are a common, benign, vascular tumor that requires no treatment unless symptomatic.      3. Seborrheic Keratoses  -  Reassurance of the benign nature of these lesions and that there is no need to treat unless they become symptomatic.    4. Lentigines  -  Reassurance of the benign nature of these lesions  -  Counseled that these lesions occur in response to excessive sun exposure over time  -  RTC if any lesions have any new or concerning changes  -  Counseled on daily sun  precautions including the use of sunscreen SPF 30+ daily and reapplying at least every 2 hours.  Also discussed sun protective clothing including long sleeves and wide brimmed hats when anticipating sun exposure. Avoid peak sun hours and seek shade from 10a-4p when possible.    5. Intradermal Nevus  -  Counseled on the benign nature of this lesion  -  RTC (return to clinic) if there are any concerning changes to lesion     6. Compound Nevus  -  Reassurance of the benign nature of these lesions.  -  Monitor at home for any concerning changes.  -  RTC (return to clinic) if any concerning changes.    7. Actinic Keratoses x 2  -  The diagnosis was discussed.    -  I recommended destruction with liquid nitrogen and after a discussion of risks and benefits of liquid nitrogen treatment the patient gave oral consent for the same.  -  Therefore liquid nitrogen was used to destroy the lesion(s).  -  Wound care and expected healing process discussed.    8. Personal History of Melanoma  -patient with a history of melanoma, located on the right upper arm s/p excision done 7/14/2020  -no signs of recurrence on exam today  -FBSE (full body skin examination) done 9/4/2024, next due 1 year  -patient counseled on strict sun precautions including photoprotection, sunscreen use, and avoidance of peak sunlight hours  -patient counseled on monthly self skin exams and regular physician skin exams  -The patient was counseled on the A, B, C, D, E's of melanoma detection  -The patient is to examine the skin on a monthly basis, and if they notice any of the changes as outlined, they should return for re-evaluation    9. Dermatitis Favor Irritant   new problem  -counseled on the nature of this condition.    -daily routine during flares and when skin is better discussed  -begin Betamethasone Valerate 0.1% ointment, Apply pea size amount topically to affected areas on extremities twice daily x 3 weeks with 1 week break. Repeat as needed. Avoid  face, armpits, and groin.   -discussed side effects of topical steroids including atrophy, dyschromia, and striae development and importance of discontinuing its use if any of these develop  -moisturize 3x daily with mild moisturizing cream all over face and body  -daily routine discussed given   -sensitive skin routine recommended  -recommend patient take warm showers, not hot, pat dry, and immediately moisturize with cream or ointments  -recommend laundry detergent with \"free and clear\" in name, avoid dryer sheets and fabric softeners  -emphasized importance of barrier repair and avoidance of irritants  -discontinue use of any loofas or wash cloths and use only hands while bathing/showering      RTC in 1 year for FBSE, sooner if needed.    On 9/4/2024, IErna CMA, documented the services personally performed by Fortino Stephenson MD.    The documentation recorded by the scribe accurately and completely reflects the service(s) I personally performed and the decisions made by me.     Fortino Stephenson MD  [facility]     95.3

## 2024-09-11 ENCOUNTER — APPOINTMENT (OUTPATIENT)
Dept: INTERNAL MEDICINE | Facility: CLINIC | Age: 69
End: 2024-09-11
Payer: MEDICARE

## 2024-09-11 VITALS
OXYGEN SATURATION: 95 % | WEIGHT: 210 LBS | SYSTOLIC BLOOD PRESSURE: 117 MMHG | BODY MASS INDEX: 33.9 KG/M2 | DIASTOLIC BLOOD PRESSURE: 68 MMHG | HEART RATE: 67 BPM

## 2024-09-11 DIAGNOSIS — I25.10 ATHEROSCLEROTIC HEART DISEASE OF NATIVE CORONARY ARTERY W/OUT ANGINA PECTORIS: ICD-10-CM

## 2024-09-11 DIAGNOSIS — Z23 ENCOUNTER FOR IMMUNIZATION: ICD-10-CM

## 2024-09-11 DIAGNOSIS — Z95.2 PRESENCE OF PROSTHETIC HEART VALVE: ICD-10-CM

## 2024-09-11 DIAGNOSIS — E78.5 HYPERLIPIDEMIA, UNSPECIFIED: ICD-10-CM

## 2024-09-11 DIAGNOSIS — I10 ESSENTIAL (PRIMARY) HYPERTENSION: ICD-10-CM

## 2024-09-11 PROCEDURE — G0008: CPT

## 2024-09-11 PROCEDURE — 99214 OFFICE O/P EST MOD 30 MIN: CPT

## 2024-09-11 PROCEDURE — 90662 IIV NO PRSV INCREASED AG IM: CPT

## 2024-09-11 PROCEDURE — 36415 COLL VENOUS BLD VENIPUNCTURE: CPT

## 2024-09-11 NOTE — HISTORY OF PRESENT ILLNESS
[FreeTextEntry1] : Routine follow-up [de-identified] : Her patient with history of hypertension, hyperlipidemia, prediabetes history of aortic stenosis presents to office for routine follow-up. Overall he feels well and denies any exertional chest pain, shortness of breath, palpitation states compliance with all medication. Recent blood work performed by his urologist including lipid and CMP were reviewed with the patient and are at goal

## 2024-11-08 ENCOUNTER — APPOINTMENT (OUTPATIENT)
Dept: CARDIOLOGY | Facility: CLINIC | Age: 69
End: 2024-11-08
Payer: MEDICARE

## 2024-11-08 ENCOUNTER — NON-APPOINTMENT (OUTPATIENT)
Age: 69
End: 2024-11-08

## 2024-11-08 VITALS
OXYGEN SATURATION: 96 % | BODY MASS INDEX: 33.43 KG/M2 | DIASTOLIC BLOOD PRESSURE: 83 MMHG | SYSTOLIC BLOOD PRESSURE: 130 MMHG | HEART RATE: 70 BPM | WEIGHT: 208 LBS | HEIGHT: 66 IN

## 2024-11-08 DIAGNOSIS — I25.10 ATHEROSCLEROTIC HEART DISEASE OF NATIVE CORONARY ARTERY W/OUT ANGINA PECTORIS: ICD-10-CM

## 2024-11-08 DIAGNOSIS — E78.5 HYPERLIPIDEMIA, UNSPECIFIED: ICD-10-CM

## 2024-11-08 PROCEDURE — G2211 COMPLEX E/M VISIT ADD ON: CPT

## 2024-11-08 PROCEDURE — 99214 OFFICE O/P EST MOD 30 MIN: CPT

## 2024-11-08 PROCEDURE — 93000 ELECTROCARDIOGRAM COMPLETE: CPT

## 2024-12-11 ENCOUNTER — APPOINTMENT (OUTPATIENT)
Dept: INTERNAL MEDICINE | Facility: CLINIC | Age: 69
End: 2024-12-11
Payer: MEDICARE

## 2024-12-11 VITALS
WEIGHT: 209 LBS | BODY MASS INDEX: 33.59 KG/M2 | DIASTOLIC BLOOD PRESSURE: 78 MMHG | SYSTOLIC BLOOD PRESSURE: 117 MMHG | RESPIRATION RATE: 12 BRPM | OXYGEN SATURATION: 95 % | HEIGHT: 66 IN | HEART RATE: 80 BPM

## 2024-12-11 DIAGNOSIS — I10 ESSENTIAL (PRIMARY) HYPERTENSION: ICD-10-CM

## 2024-12-11 DIAGNOSIS — E78.5 HYPERLIPIDEMIA, UNSPECIFIED: ICD-10-CM

## 2024-12-11 DIAGNOSIS — R73.03 PREDIABETES.: ICD-10-CM

## 2024-12-11 DIAGNOSIS — I25.10 ATHEROSCLEROTIC HEART DISEASE OF NATIVE CORONARY ARTERY W/OUT ANGINA PECTORIS: ICD-10-CM

## 2024-12-11 PROCEDURE — 99214 OFFICE O/P EST MOD 30 MIN: CPT

## 2024-12-11 PROCEDURE — G2211 COMPLEX E/M VISIT ADD ON: CPT

## 2024-12-11 PROCEDURE — 36415 COLL VENOUS BLD VENIPUNCTURE: CPT

## 2024-12-12 LAB
ALBUMIN SERPL ELPH-MCNC: 4.2 G/DL
ALP BLD-CCNC: 65 U/L
ALT SERPL-CCNC: 32 U/L
ANION GAP SERPL CALC-SCNC: 12 MMOL/L
AST SERPL-CCNC: 27 U/L
BILIRUB SERPL-MCNC: 0.4 MG/DL
BUN SERPL-MCNC: 18 MG/DL
CALCIUM SERPL-MCNC: 9.4 MG/DL
CHLORIDE SERPL-SCNC: 105 MMOL/L
CHOLEST SERPL-MCNC: 153 MG/DL
CO2 SERPL-SCNC: 26 MMOL/L
CREAT SERPL-MCNC: 1.1 MG/DL
EGFR: 72 ML/MIN/1.73M2
ESTIMATED AVERAGE GLUCOSE: 128 MG/DL
GLUCOSE SERPL-MCNC: 119 MG/DL
GLUCOSE SERPL-MCNC: 134 MG/DL
HBA1C MFR BLD HPLC: 6.1 %
HDLC SERPL-MCNC: 43 MG/DL
LDLC SERPL CALC-MCNC: 87 MG/DL
NONHDLC SERPL-MCNC: 110 MG/DL
POTASSIUM SERPL-SCNC: 4.5 MMOL/L
PROT SERPL-MCNC: 7.3 G/DL
SODIUM SERPL-SCNC: 142 MMOL/L
TRIGL SERPL-MCNC: 128 MG/DL

## 2024-12-16 ENCOUNTER — APPOINTMENT (OUTPATIENT)
Dept: CARDIOLOGY | Facility: CLINIC | Age: 69
End: 2024-12-16
Payer: MEDICARE

## 2024-12-16 PROCEDURE — 93306 TTE W/DOPPLER COMPLETE: CPT

## 2024-12-20 ENCOUNTER — RX RENEWAL (OUTPATIENT)
Age: 69
End: 2024-12-20

## 2025-01-02 ENCOUNTER — RX RENEWAL (OUTPATIENT)
Age: 70
End: 2025-01-02

## 2025-01-23 ENCOUNTER — RX RENEWAL (OUTPATIENT)
Age: 70
End: 2025-01-23

## 2025-04-19 ENCOUNTER — RX RENEWAL (OUTPATIENT)
Age: 70
End: 2025-04-19

## 2025-04-21 ENCOUNTER — APPOINTMENT (OUTPATIENT)
Dept: INTERNAL MEDICINE | Facility: CLINIC | Age: 70
End: 2025-04-21
Payer: MEDICARE

## 2025-04-21 VITALS
OXYGEN SATURATION: 95 % | SYSTOLIC BLOOD PRESSURE: 125 MMHG | HEIGHT: 66 IN | HEART RATE: 76 BPM | BODY MASS INDEX: 34.55 KG/M2 | DIASTOLIC BLOOD PRESSURE: 80 MMHG | WEIGHT: 215 LBS | RESPIRATION RATE: 14 BRPM

## 2025-04-21 VITALS — SYSTOLIC BLOOD PRESSURE: 120 MMHG | DIASTOLIC BLOOD PRESSURE: 80 MMHG

## 2025-04-21 DIAGNOSIS — E78.5 HYPERLIPIDEMIA, UNSPECIFIED: ICD-10-CM

## 2025-04-21 DIAGNOSIS — Z95.2 PRESENCE OF PROSTHETIC HEART VALVE: ICD-10-CM

## 2025-04-21 DIAGNOSIS — R73.03 PREDIABETES.: ICD-10-CM

## 2025-04-21 DIAGNOSIS — M23.8X2 OTHER INTERNAL DERANGEMENTS OF LEFT KNEE: ICD-10-CM

## 2025-04-21 DIAGNOSIS — I10 ESSENTIAL (PRIMARY) HYPERTENSION: ICD-10-CM

## 2025-04-21 PROCEDURE — 99214 OFFICE O/P EST MOD 30 MIN: CPT

## 2025-04-21 PROCEDURE — G2211 COMPLEX E/M VISIT ADD ON: CPT

## 2025-04-23 LAB
ALBUMIN SERPL ELPH-MCNC: 4.2 G/DL
ALP BLD-CCNC: 60 U/L
ALT SERPL-CCNC: 21 U/L
ANION GAP SERPL CALC-SCNC: 13 MMOL/L
AST SERPL-CCNC: 21 U/L
BILIRUB SERPL-MCNC: 0.4 MG/DL
BUN SERPL-MCNC: 21 MG/DL
CALCIUM SERPL-MCNC: 9.4 MG/DL
CHLORIDE SERPL-SCNC: 105 MMOL/L
CHOLEST SERPL-MCNC: 150 MG/DL
CO2 SERPL-SCNC: 26 MMOL/L
CREAT SERPL-MCNC: 1.02 MG/DL
EGFRCR SERPLBLD CKD-EPI 2021: 79 ML/MIN/1.73M2
ESTIMATED AVERAGE GLUCOSE: 137 MG/DL
GLUCOSE SERPL-MCNC: 104 MG/DL
GLUCOSE SERPL-MCNC: 106 MG/DL
HBA1C MFR BLD HPLC: 6.4 %
HDLC SERPL-MCNC: 39 MG/DL
LDLC SERPL-MCNC: 84 MG/DL
NONHDLC SERPL-MCNC: 111 MG/DL
POTASSIUM SERPL-SCNC: 4.5 MMOL/L
PROT SERPL-MCNC: 7.4 G/DL
SODIUM SERPL-SCNC: 143 MMOL/L
TRIGL SERPL-MCNC: 153 MG/DL

## 2025-04-24 ENCOUNTER — APPOINTMENT (OUTPATIENT)
Dept: ORTHOPEDIC SURGERY | Facility: CLINIC | Age: 70
End: 2025-04-24
Payer: MEDICARE

## 2025-04-24 VITALS — WEIGHT: 210 LBS | HEIGHT: 66 IN | BODY MASS INDEX: 33.75 KG/M2

## 2025-04-24 DIAGNOSIS — M25.562 PAIN IN LEFT KNEE: ICD-10-CM

## 2025-04-24 DIAGNOSIS — M17.12 UNILATERAL PRIMARY OSTEOARTHRITIS, LEFT KNEE: ICD-10-CM

## 2025-04-24 PROCEDURE — 99204 OFFICE O/P NEW MOD 45 MIN: CPT

## 2025-04-24 PROCEDURE — 73564 X-RAY EXAM KNEE 4 OR MORE: CPT | Mod: LT

## 2025-04-24 RX ORDER — IBUPROFEN 600 MG/1
600 TABLET, FILM COATED ORAL 3 TIMES DAILY
Qty: 30 | Refills: 0 | Status: COMPLETED | COMMUNITY
Start: 2025-04-24 | End: 2025-05-04

## 2025-05-05 ENCOUNTER — NON-APPOINTMENT (OUTPATIENT)
Age: 70
End: 2025-05-05

## 2025-05-09 ENCOUNTER — NON-APPOINTMENT (OUTPATIENT)
Age: 70
End: 2025-05-09

## 2025-05-09 ENCOUNTER — APPOINTMENT (OUTPATIENT)
Dept: CARDIOLOGY | Facility: CLINIC | Age: 70
End: 2025-05-09
Payer: MEDICARE

## 2025-05-09 VITALS
WEIGHT: 206 LBS | OXYGEN SATURATION: 94 % | HEIGHT: 66 IN | HEART RATE: 70 BPM | SYSTOLIC BLOOD PRESSURE: 119 MMHG | DIASTOLIC BLOOD PRESSURE: 83 MMHG | BODY MASS INDEX: 33.11 KG/M2

## 2025-05-09 DIAGNOSIS — I25.10 ATHEROSCLEROTIC HEART DISEASE OF NATIVE CORONARY ARTERY W/OUT ANGINA PECTORIS: ICD-10-CM

## 2025-05-09 PROCEDURE — 99215 OFFICE O/P EST HI 40 MIN: CPT

## 2025-05-09 PROCEDURE — G2211 COMPLEX E/M VISIT ADD ON: CPT

## 2025-05-09 PROCEDURE — 93000 ELECTROCARDIOGRAM COMPLETE: CPT

## 2025-06-02 ENCOUNTER — NON-APPOINTMENT (OUTPATIENT)
Age: 70
End: 2025-06-02

## 2025-06-04 ENCOUNTER — APPOINTMENT (OUTPATIENT)
Dept: CARDIOLOGY | Facility: CLINIC | Age: 70
End: 2025-06-04
Payer: MEDICARE

## 2025-06-04 PROCEDURE — 78452 HT MUSCLE IMAGE SPECT MULT: CPT

## 2025-06-04 PROCEDURE — A9500: CPT

## 2025-06-04 PROCEDURE — 93015 CV STRESS TEST SUPVJ I&R: CPT

## 2025-06-12 ENCOUNTER — RX RENEWAL (OUTPATIENT)
Age: 70
End: 2025-06-12

## 2025-07-22 ENCOUNTER — APPOINTMENT (OUTPATIENT)
Dept: PHARMACY | Facility: CLINIC | Age: 70
End: 2025-07-22
Payer: SELF-PAY

## 2025-07-22 PROCEDURE — V5299A: CUSTOM

## 2025-07-24 ENCOUNTER — RX RENEWAL (OUTPATIENT)
Age: 70
End: 2025-07-24

## 2025-08-04 ENCOUNTER — APPOINTMENT (OUTPATIENT)
Dept: PHARMACY | Facility: CLINIC | Age: 70
End: 2025-08-04
Payer: SELF-PAY

## 2025-08-04 PROCEDURE — V5299A: CUSTOM

## 2025-08-06 ENCOUNTER — APPOINTMENT (OUTPATIENT)
Age: 70
End: 2025-08-06
Payer: MEDICARE

## 2025-08-06 VITALS
WEIGHT: 217 LBS | DIASTOLIC BLOOD PRESSURE: 90 MMHG | SYSTOLIC BLOOD PRESSURE: 153 MMHG | BODY MASS INDEX: 34.87 KG/M2 | RESPIRATION RATE: 12 BRPM | OXYGEN SATURATION: 96 % | HEIGHT: 66 IN | HEART RATE: 72 BPM

## 2025-08-06 VITALS — SYSTOLIC BLOOD PRESSURE: 124 MMHG | DIASTOLIC BLOOD PRESSURE: 86 MMHG

## 2025-08-06 DIAGNOSIS — E78.5 HYPERLIPIDEMIA, UNSPECIFIED: ICD-10-CM

## 2025-08-06 DIAGNOSIS — I25.10 ATHEROSCLEROTIC HEART DISEASE OF NATIVE CORONARY ARTERY W/OUT ANGINA PECTORIS: ICD-10-CM

## 2025-08-06 DIAGNOSIS — I10 ESSENTIAL (PRIMARY) HYPERTENSION: ICD-10-CM

## 2025-08-06 DIAGNOSIS — R73.03 PREDIABETES.: ICD-10-CM

## 2025-08-06 PROCEDURE — G2211 COMPLEX E/M VISIT ADD ON: CPT

## 2025-08-06 PROCEDURE — 36415 COLL VENOUS BLD VENIPUNCTURE: CPT

## 2025-08-06 PROCEDURE — 99214 OFFICE O/P EST MOD 30 MIN: CPT

## 2025-08-07 LAB
ALBUMIN SERPL ELPH-MCNC: 4.2 G/DL
ALP BLD-CCNC: 68 U/L
ALT SERPL-CCNC: 32 U/L
ANION GAP SERPL CALC-SCNC: 14 MMOL/L
AST SERPL-CCNC: 28 U/L
BILIRUB SERPL-MCNC: 0.5 MG/DL
BUN SERPL-MCNC: 17 MG/DL
CALCIUM SERPL-MCNC: 9.6 MG/DL
CHLORIDE SERPL-SCNC: 104 MMOL/L
CHOLEST SERPL-MCNC: 177 MG/DL
CO2 SERPL-SCNC: 25 MMOL/L
CREAT SERPL-MCNC: 1.14 MG/DL
EGFRCR SERPLBLD CKD-EPI 2021: 69 ML/MIN/1.73M2
ESTIMATED AVERAGE GLUCOSE: 126 MG/DL
GLUCOSE SERPL-MCNC: 105 MG/DL
GLUCOSE SERPL-MCNC: 111 MG/DL
HBA1C MFR BLD HPLC: 6 %
HDLC SERPL-MCNC: 44 MG/DL
LDLC SERPL-MCNC: 106 MG/DL
NONHDLC SERPL-MCNC: 134 MG/DL
POTASSIUM SERPL-SCNC: 4.5 MMOL/L
PROT SERPL-MCNC: 7.5 G/DL
SODIUM SERPL-SCNC: 143 MMOL/L
TRIGL SERPL-MCNC: 159 MG/DL

## 2025-08-26 ENCOUNTER — NON-APPOINTMENT (OUTPATIENT)
Age: 70
End: 2025-08-26

## 2025-08-26 ENCOUNTER — APPOINTMENT (OUTPATIENT)
Dept: CARDIOLOGY | Facility: CLINIC | Age: 70
End: 2025-08-26
Payer: MEDICARE

## 2025-08-26 VITALS
DIASTOLIC BLOOD PRESSURE: 70 MMHG | BODY MASS INDEX: 34.55 KG/M2 | HEART RATE: 67 BPM | HEIGHT: 66 IN | OXYGEN SATURATION: 96 % | SYSTOLIC BLOOD PRESSURE: 112 MMHG | WEIGHT: 215 LBS

## 2025-08-26 DIAGNOSIS — E78.5 HYPERLIPIDEMIA, UNSPECIFIED: ICD-10-CM

## 2025-08-26 DIAGNOSIS — Z95.2 PRESENCE OF PROSTHETIC HEART VALVE: ICD-10-CM

## 2025-08-26 DIAGNOSIS — I10 ESSENTIAL (PRIMARY) HYPERTENSION: ICD-10-CM

## 2025-08-26 DIAGNOSIS — Z01.810 ENCOUNTER FOR PREPROCEDURAL CARDIOVASCULAR EXAMINATION: ICD-10-CM

## 2025-08-26 PROCEDURE — 93000 ELECTROCARDIOGRAM COMPLETE: CPT

## 2025-08-26 PROCEDURE — 99214 OFFICE O/P EST MOD 30 MIN: CPT

## 2025-08-26 PROCEDURE — G2211 COMPLEX E/M VISIT ADD ON: CPT

## 2025-09-08 ENCOUNTER — APPOINTMENT (OUTPATIENT)
Age: 70
End: 2025-09-08
Payer: MEDICARE

## 2025-09-08 VITALS
WEIGHT: 212 LBS | BODY MASS INDEX: 39.01 KG/M2 | HEART RATE: 61 BPM | HEIGHT: 62 IN | RESPIRATION RATE: 12 BRPM | OXYGEN SATURATION: 99 % | TEMPERATURE: 97.7 F | DIASTOLIC BLOOD PRESSURE: 84 MMHG | SYSTOLIC BLOOD PRESSURE: 131 MMHG

## 2025-09-08 VITALS — SYSTOLIC BLOOD PRESSURE: 120 MMHG | DIASTOLIC BLOOD PRESSURE: 70 MMHG

## 2025-09-08 DIAGNOSIS — Z95.2 PRESENCE OF PROSTHETIC HEART VALVE: ICD-10-CM

## 2025-09-08 DIAGNOSIS — R73.03 PREDIABETES.: ICD-10-CM

## 2025-09-08 DIAGNOSIS — Z86.19 PERSONAL HISTORY OF OTHER INFECTIOUS AND PARASITIC DISEASES: ICD-10-CM

## 2025-09-08 DIAGNOSIS — E78.5 HYPERLIPIDEMIA, UNSPECIFIED: ICD-10-CM

## 2025-09-08 DIAGNOSIS — G47.33 OBSTRUCTIVE SLEEP APNEA (ADULT) (PEDIATRIC): ICD-10-CM

## 2025-09-08 DIAGNOSIS — I10 ESSENTIAL (PRIMARY) HYPERTENSION: ICD-10-CM

## 2025-09-08 DIAGNOSIS — Z01.818 ENCOUNTER FOR OTHER PREPROCEDURAL EXAMINATION: ICD-10-CM

## 2025-09-08 DIAGNOSIS — T63.441A TOXIC EFFECT OF VENOM OF BEES, ACCIDENTAL (UNINTENTIONAL), INITIAL ENCOUNTER: ICD-10-CM

## 2025-09-08 PROCEDURE — G2211 COMPLEX E/M VISIT ADD ON: CPT

## 2025-09-08 PROCEDURE — 99214 OFFICE O/P EST MOD 30 MIN: CPT

## (undated) DEVICE — CLAMP BULLDOG MIDI 45 DEGREE (YELLOW) DISP

## (undated) DEVICE — CONNECTOR CARDIAC 1:1 FOR HUBLESS DRAINS

## (undated) DEVICE — SUCTION TUBE CARDIAC SOFT TIP 6FR SHAFT 10FR TIP 6"

## (undated) DEVICE — MEDTRONIC CLEARVIEW BLOWER MISTER KIT W TUBING SET

## (undated) DEVICE — DRAPE SLUSH / WARMER 44 X 66"

## (undated) DEVICE — PACK CUSTOM W/INSPIRE OXYGENATOR

## (undated) DEVICE — SUCTION YANKAUER BULBOUS TIP NO VENT

## (undated) DEVICE — TUBING TUR 2 PRONG

## (undated) DEVICE — STAPLER SKIN VISI-STAT 35 WIDE

## (undated) DEVICE — PREP CHLORAPREP HI-LITE ORANGE 26ML

## (undated) DEVICE — Device

## (undated) DEVICE — SUT SILK 5-0 60" TIES

## (undated) DEVICE — CONNECTOR STRAIGHT 3/8 X 1/2"

## (undated) DEVICE — SYR LUER LOK 50CC

## (undated) DEVICE — BLADE SCALPEL SAFETYLOCK #15

## (undated) DEVICE — DRAPE 1/2 SHEET 40X57"

## (undated) DEVICE — STOPCOCK 4-WAY (BLUE) DISCOFIX SPIN-LOCK CONNECTOR

## (undated) DEVICE — PACING CABLE A/V TEMP SCREW DOWN 6FT

## (undated) DEVICE — GLV 7 PROTEXIS (WHITE)

## (undated) DEVICE — STRYKER INTERPULSE HANDPIECE W IRR SUCTION TUBE

## (undated) DEVICE — SUT SOFSILK 4-0 18" TIES

## (undated) DEVICE — SOL IRR BAG NS 0.9% 3000ML

## (undated) DEVICE — ELCTR BOVIE PENCIL HANDPIECE

## (undated) DEVICE — SUT VICRYL 3-0 27" CT-1

## (undated) DEVICE — DRAIN PLEUROVAC

## (undated) DEVICE — SUT PROLENE 6-0 30" C-1

## (undated) DEVICE — STABILIZER HAND ASSISTANT ATTACHMENT W STABLESOFT 2S

## (undated) DEVICE — PACING CABLE (BROWN) A/V TEMP SCREW DOWN 12FT

## (undated) DEVICE — TUBING SUCTION NON CONDUCTIVE 316X18" STERILE

## (undated) DEVICE — SUT VICRYL 0 36" CTX UNDYED

## (undated) DEVICE — FILTER REINFUSION FOR SALVAGED BLOOD DISP

## (undated) DEVICE — SUT ETHIBOND 1 30" CT-1

## (undated) DEVICE — GLV 8.5 PROTEXIS (BLUE)

## (undated) DEVICE — MULTIPLE PERFUSION SET FEMALE 1 INLET LEG W 4 LEGS 15" (BLUE/RED)

## (undated) DEVICE — ELCTR HEX BLADE

## (undated) DEVICE — DRAPE MAYO STAND 30"

## (undated) DEVICE — DRSG KLING 3"

## (undated) DEVICE — SYR LUER LOK 3CC

## (undated) DEVICE — TUBING CANNULA SALTER LABS TENDER GRIP ADULT

## (undated) DEVICE — CONNECTOR STRAIGHT 3/8 X 3/8"

## (undated) DEVICE — FOLEY TRAY 16FR 5CC LF LUBRISIL ADVANCE TEMP CLOSED

## (undated) DEVICE — BLADE ACCESS RAIL DEEP

## (undated) DEVICE — SUT SILK 2-0 18" SH (POP-OFF)

## (undated) DEVICE — POSITIONER FOAM EGG CRATE ULNAR 2PCS (PINK)

## (undated) DEVICE — TUBING IV SET MICROCLAVE ADAPTER

## (undated) DEVICE — PACK UNIVERSAL CARDIAC

## (undated) DEVICE — PACING CABLE BI-V TEMP ALLIGATOR CLIP 8FR

## (undated) DEVICE — GOWN LG

## (undated) DEVICE — SUT SOFSILK 3-0 30" V-20

## (undated) DEVICE — VENTING ADAPTER "Y" (RED/BLUE) 7.5"

## (undated) DEVICE — SUT SILK 0 30" KS

## (undated) DEVICE — SUCTION YANKAUER BULBOUS TIP W VENT

## (undated) DEVICE — SOL IRR POUR NS 0.9% 500ML

## (undated) DEVICE — SOL NORMOSOL-R PH7.4 1000ML

## (undated) DEVICE — TUBING KIT FAST START ATF 40

## (undated) DEVICE — GLV 8 PROTEXIS (WHITE)

## (undated) DEVICE — SUT BOOT STANDARD (YELLOW) 5 PAIR

## (undated) DEVICE — VESSEL LOOP MAXI-RED  0.120" X 16"

## (undated) DEVICE — TUBING INSUFFLATION LAP FILTER 10FT

## (undated) DEVICE — SUT PROLENE 7-0 24" BV-1

## (undated) DEVICE — NDL HYPO SAFE 25G X 1" (ORANGE)

## (undated) DEVICE — GLV 7.5 PROTEXIS (WHITE)

## (undated) DEVICE — DRAPE TOWEL BLUE 17" X 24"

## (undated) DEVICE — SYS VEIN HARVESTING VIRTUOSAPH PLUS W/ RADIAL

## (undated) DEVICE — SUT PROLENE 8-0 24" BV175-6

## (undated) DEVICE — DRSG ACE BANDAGE 6"

## (undated) DEVICE — GOWN TRIMAX LG

## (undated) DEVICE — SAW BLADE SYNVASIVE OSCILLATING

## (undated) DEVICE — SOL INJ NS 0.9% 1000ML

## (undated) DEVICE — DRSG KLING 6"

## (undated) DEVICE — AORTIC PUNCH 4.0MM LONG LENGTH HANDLE

## (undated) DEVICE — SYR LUER LOK 30CC

## (undated) DEVICE — DRSG TEGADERM 6"X8"

## (undated) DEVICE — DRAPE IOBAN 33" X 23"

## (undated) DEVICE — SYR ASEPTO

## (undated) DEVICE — GOWN TRIMAX XXL

## (undated) DEVICE — POSITIONER FOAM HEADREST (PINK)

## (undated) DEVICE — DRAPE LIGHT HANDLE COVER (BLUE)

## (undated) DEVICE — CHEST DRAIN OASIS DRY SUCTION WATER SEAL

## (undated) DEVICE — FEEDING TUBE NG SUMP 16FR 48"

## (undated) DEVICE — NDL COUNTER FOAM AND MAGNET 40-70

## (undated) DEVICE — SAW BLADE MICROAIRE OSCILLATING LG 0.9X64X33.5MM

## (undated) DEVICE — DRSG OPSITE 2.5 X 2"

## (undated) DEVICE — DRAIN DRAINGE CHEST DRY ADL DUAL

## (undated) DEVICE — GETINGE VASOVIEW 7 ENDOSCOPIC VESSEL HARVESTING SYSTEM

## (undated) DEVICE — LAP PAD 18 X 18"

## (undated) DEVICE — TUBING SUCTION 20FT

## (undated) DEVICE — SUT BLUNT SZ 5

## (undated) DEVICE — SUT STAINLESS STEEL 5 18" CCS

## (undated) DEVICE — DRAIN CHANNEL 32FR ROUND HUBLESS FULL FLUTED

## (undated) DEVICE — SOL ANTI FOG

## (undated) DEVICE — PACK CARDIAC YELLOW

## (undated) DEVICE — SUMP PERICARDIAL 20FR 1/4" ADULT

## (undated) DEVICE — NDL HYPO SAFE 18G X 1.5" (PINK)

## (undated) DEVICE — SUT PLEDGET SOFT LARGE 3/8" X 3/16" X 1/16" X6

## (undated) DEVICE — TOURNIQUET SET 12FR (1 RED, 1 BLUE, 1 SNARE) 7"

## (undated) DEVICE — SUT MONOCRYL 4-0 18" PS-2

## (undated) DEVICE — VESSEL LOOP EXTRA MAXI-BLUE 0.200" X 22"

## (undated) DEVICE — DRSG DERMABOND PRINEO 22CM

## (undated) DEVICE — SUT DOUBLE 6 WIRE STERNAL

## (undated) DEVICE — POSITIONER CARDIAC BUMP

## (undated) DEVICE — DRSG OPSITE 13.75 X 4"

## (undated) DEVICE — SAW BLADE MICROAIRE STERNUM 1X34X9.4MM

## (undated) DEVICE — SUT PROLENE 4-0 30" SH-1

## (undated) DEVICE — DRSG DERMABOND PRINEO 60CM

## (undated) DEVICE — WARMING BLANKET DUO-THERM HYPER/HYPOTHERM ADULT

## (undated) DEVICE — SUT TICRON 2-0 36" CV-316 DA

## (undated) DEVICE — TUBING TRUWAVE PRESSURE MALE/FEMALE 72"

## (undated) DEVICE — NDL HYPO NONSAFE 20G X 1.5" (YELLOW)

## (undated) DEVICE — BEAVER BLADE MINI SHARP ALL ROUND (BLUE)

## (undated) DEVICE — SUCTION CATH ARGYLE WHISTLE TIP 14FR STRAIGHT PACKED

## (undated) DEVICE — GLV 6.5 PROTEXIS (WHITE)

## (undated) DEVICE — SUT VICRYL 1 36" CTX UNDYED

## (undated) DEVICE — GLV 8.5 PROTEXIS (WHITE)

## (undated) DEVICE — SYR LUER LOK 20CC

## (undated) DEVICE — CANISTER SUCTION 2000CC

## (undated) DEVICE — NDL BLUNT 18G LOOP VESSEL MAXI WHITE

## (undated) DEVICE — CONNECTOR "Y" 1/2 X 3/8 X 3/8"

## (undated) DEVICE — SUT SOFSILK 0 30" TIES

## (undated) DEVICE — DRAIN RESERVOIR FOR JACKSON PRATT 100CC CARDINAL

## (undated) DEVICE — HEMOCONCENTRATOR PERFUSION ID TUBING 1/4"

## (undated) DEVICE — SPECIMEN CONTAINER 100ML

## (undated) DEVICE — SUT PROLENE 3-0 36" RB-1

## (undated) DEVICE — DRAPE INSTRUMENT POUCH 6.75" X 11"

## (undated) DEVICE — SYNOVIS VASCULAR PROBE 1.5MM 15CM

## (undated) DEVICE — BLADE SCALPEL SAFETYLOCK #11